# Patient Record
Sex: FEMALE | Race: WHITE | HISPANIC OR LATINO | Employment: FULL TIME | URBAN - METROPOLITAN AREA
[De-identification: names, ages, dates, MRNs, and addresses within clinical notes are randomized per-mention and may not be internally consistent; named-entity substitution may affect disease eponyms.]

---

## 2017-03-05 ENCOUNTER — PATIENT MESSAGE (OUTPATIENT)
Dept: ALLERGY | Facility: CLINIC | Age: 42
End: 2017-03-05

## 2017-03-08 RX ORDER — ALBUTEROL SULFATE 90 UG/1
2 AEROSOL, METERED RESPIRATORY (INHALATION) EVERY 6 HOURS PRN
Qty: 18 G | Refills: 2 | Status: SHIPPED | OUTPATIENT
Start: 2017-03-08 | End: 2017-07-06 | Stop reason: ALTCHOICE

## 2017-03-08 RX ORDER — ALBUTEROL SULFATE 90 UG/1
2 AEROSOL, METERED RESPIRATORY (INHALATION) EVERY 4 HOURS PRN
Qty: 1 INHALER | Refills: 3 | Status: SHIPPED | OUTPATIENT
Start: 2017-03-08 | End: 2017-05-22 | Stop reason: SDUPTHER

## 2017-04-05 ENCOUNTER — PATIENT MESSAGE (OUTPATIENT)
Dept: INTERNAL MEDICINE | Facility: CLINIC | Age: 42
End: 2017-04-05

## 2017-04-06 NOTE — TELEPHONE ENCOUNTER
If these symptoms persist please have patient come in for an appointment so we can determine what is going on and what is the best treatment.

## 2017-04-21 ENCOUNTER — HOSPITAL ENCOUNTER (OUTPATIENT)
Dept: RADIOLOGY | Facility: OTHER | Age: 42
Discharge: HOME | End: 2017-04-21
Attending: OBSTETRICS & GYNECOLOGY
Payer: COMMERCIAL

## 2017-04-21 DIAGNOSIS — Z12.31 VISIT FOR SCREENING MAMMOGRAM: ICD-10-CM

## 2017-04-21 PROCEDURE — 77067 SCR MAMMO BI INCL CAD: CPT | Mod: 26,,, | Performed by: RADIOLOGY

## 2017-04-21 PROCEDURE — 77063 BREAST TOMOSYNTHESIS BI: CPT | Mod: 26,,, | Performed by: RADIOLOGY

## 2017-04-21 PROCEDURE — 77067 SCR MAMMO BI INCL CAD: CPT | Mod: TC

## 2017-05-21 ENCOUNTER — PATIENT MESSAGE (OUTPATIENT)
Dept: ALLERGY | Facility: CLINIC | Age: 42
End: 2017-05-21

## 2017-05-23 RX ORDER — ALBUTEROL SULFATE 90 UG/1
2 AEROSOL, METERED RESPIRATORY (INHALATION) EVERY 4 HOURS PRN
Qty: 1 INHALER | Refills: 3 | Status: SHIPPED | OUTPATIENT
Start: 2017-05-23 | End: 2018-05-07 | Stop reason: SDUPTHER

## 2017-07-05 ENCOUNTER — TELEPHONE (OUTPATIENT)
Dept: INTERNAL MEDICINE | Facility: CLINIC | Age: 42
End: 2017-07-05

## 2017-07-05 NOTE — TELEPHONE ENCOUNTER
----- Message from Yaenlis Ortiz sent at 7/5/2017  1:13 PM CDT -----  Contact: pt  _  1st Request  _  2nd Request  _  3rd Request        Who: pt    Why: pt suspects uti. Wants to know if she can be prescribed something or does she have to come in?    What Number to Call Back:[phone]    When to Expect a call back: (With in 24 hours)

## 2017-07-06 ENCOUNTER — OFFICE VISIT (OUTPATIENT)
Dept: INTERNAL MEDICINE | Facility: CLINIC | Age: 42
End: 2017-07-06
Payer: COMMERCIAL

## 2017-07-06 VITALS
DIASTOLIC BLOOD PRESSURE: 80 MMHG | SYSTOLIC BLOOD PRESSURE: 110 MMHG | HEART RATE: 68 BPM | BODY MASS INDEX: 36.51 KG/M2 | HEIGHT: 64 IN | OXYGEN SATURATION: 97 % | WEIGHT: 213.88 LBS

## 2017-07-06 DIAGNOSIS — N39.0 URINARY TRACT INFECTION WITH HEMATURIA, SITE UNSPECIFIED: Primary | ICD-10-CM

## 2017-07-06 DIAGNOSIS — R31.9 URINARY TRACT INFECTION WITH HEMATURIA, SITE UNSPECIFIED: Primary | ICD-10-CM

## 2017-07-06 LAB
BACTERIA #/AREA URNS HPF: ABNORMAL /HPF
BILIRUB SERPL-MCNC: NEGATIVE MG/DL
BILIRUB UR QL STRIP: NEGATIVE
BLOOD URINE, POC: NORMAL
CLARITY UR: ABNORMAL
COLOR UR: YELLOW
COLOR, POC UA: YELLOW
GLUCOSE UR QL STRIP: NEGATIVE
GLUCOSE UR QL STRIP: NORMAL
HGB UR QL STRIP: ABNORMAL
KETONES UR QL STRIP: NEGATIVE
KETONES UR QL STRIP: NEGATIVE
LEUKOCYTE ESTERASE UR QL STRIP: NEGATIVE
LEUKOCYTE ESTERASE URINE, POC: NORMAL
MICROSCOPIC COMMENT: ABNORMAL
NITRITE UR QL STRIP: POSITIVE
NITRITE, POC UA: POSITIVE
PH UR STRIP: 6 [PH] (ref 5–8)
PH, POC UA: 5
PROT UR QL STRIP: ABNORMAL
PROTEIN, POC: NORMAL
RBC #/AREA URNS HPF: 15 /HPF (ref 0–4)
SP GR UR STRIP: >=1.03 (ref 1–1.03)
SPECIFIC GRAVITY, POC UA: 1.02
SQUAMOUS #/AREA URNS HPF: 1 /HPF
URN SPEC COLLECT METH UR: ABNORMAL
UROBILINOGEN UR STRIP-ACNC: NEGATIVE EU/DL
UROBILINOGEN, POC UA: NORMAL
WBC #/AREA URNS HPF: 5 /HPF (ref 0–5)
WBC CLUMPS URNS QL MICRO: ABNORMAL

## 2017-07-06 PROCEDURE — 99213 OFFICE O/P EST LOW 20 MIN: CPT | Mod: 25,S$GLB,, | Performed by: NURSE PRACTITIONER

## 2017-07-06 PROCEDURE — 81001 URINALYSIS AUTO W/SCOPE: CPT | Mod: S$GLB,,, | Performed by: NURSE PRACTITIONER

## 2017-07-06 PROCEDURE — 81000 URINALYSIS NONAUTO W/SCOPE: CPT

## 2017-07-06 PROCEDURE — 99999 PR PBB SHADOW E&M-EST. PATIENT-LVL III: CPT | Mod: PBBFAC,,, | Performed by: NURSE PRACTITIONER

## 2017-07-06 RX ORDER — SULFAMETHOXAZOLE AND TRIMETHOPRIM 800; 160 MG/1; MG/1
1 TABLET ORAL 2 TIMES DAILY
Qty: 10 TABLET | Refills: 0 | Status: SHIPPED | OUTPATIENT
Start: 2017-07-06 | End: 2018-05-15

## 2017-07-06 NOTE — PROGRESS NOTES
Subjective:       Patient ID: Tete Liu is a 41 y.o. female.    Chief Complaint: Urinary Tract Infection (painful urination, blood, small clots of tissue, on the external uncomfertable burning, took ibeprofen.  pinkish urine. it all started 7/5/17)    Tete Liu seen in urgent care for UTI symptoms    Reports dysuria, urinary frequency, and hematuria x 1 day. Denies fevers, chills, n/v, abdominal pain, or suprapubic pain. Of note, completed 7-day course of cipro for root canal two weeks ago. Recent travel to [place] on beach vacation. Denies recent instrumentation or hospitalization.       Urinary Tract Infection    This is a new problem. The current episode started yesterday. The problem has been unchanged. There has been no fever. Associated symptoms include frequency, hematuria and urgency. Pertinent negatives include no chills, discharge, flank pain, hesitancy, nausea, possible pregnancy, sweats, vomiting, constipation or rash. Associated symptoms comments:  had vasectomy, adamantly denied pregnancy. She has tried NSAIDs for the symptoms. The treatment provided no relief. There is no history of catheterization, diabetes mellitus, genitourinary reflux, hypertension, recurrent UTIs, STD or a urological procedure.     Review of Systems   Constitutional: Negative for chills.   HENT: Negative.    Eyes: Negative.    Respiratory: Negative.    Gastrointestinal: Negative for constipation, nausea and vomiting.   Endocrine: Negative.    Genitourinary: Positive for frequency, hematuria and urgency. Negative for flank pain and hesitancy.   Musculoskeletal: Negative.    Skin: Negative for rash.   Neurological: Negative.    Psychiatric/Behavioral: Negative for dysphoric mood.       Objective:      Physical Exam   Constitutional: She is oriented to person, place, and time. She appears well-developed and well-nourished.   HENT:   Head: Normocephalic and atraumatic.   Eyes: Conjunctivae and EOM are normal. Pupils are  equal, round, and reactive to light.   Neck: Normal range of motion. Neck supple.   Cardiovascular: Normal rate, regular rhythm, normal heart sounds and intact distal pulses.    Pulmonary/Chest: Effort normal.   Abdominal: Soft. Bowel sounds are normal. There is no tenderness.   Musculoskeletal: Normal range of motion.   Neurological: She is alert and oriented to person, place, and time.   Skin: Skin is warm and dry.   Psychiatric: Her behavior is normal.       Assessment:       1. Urinary tract infection with hematuria, site unspecified        Plan:       -POCT urinalysis + leukocytes, nitrites, and blood. Lab: UA and culture. Will email results per pt preference  -Bactrim DS BID x 5 days  -Symptomatic care  -f/u if failure to improve

## 2017-07-07 ENCOUNTER — PATIENT MESSAGE (OUTPATIENT)
Dept: INTERNAL MEDICINE | Facility: CLINIC | Age: 42
End: 2017-07-07

## 2017-07-09 ENCOUNTER — HOSPITAL ENCOUNTER (OUTPATIENT)
Facility: HOSPITAL | Age: 42
Discharge: HOME | End: 2017-07-10
Attending: EMERGENCY MEDICINE | Admitting: OTOLARYNGOLOGY
Payer: COMMERCIAL

## 2017-07-09 DIAGNOSIS — M79.5 FOREIGN BODY (FB) IN SOFT TISSUE: ICD-10-CM

## 2017-07-09 DIAGNOSIS — T17.208A FOREIGN BODY IN THROAT, INITIAL ENCOUNTER: Primary | ICD-10-CM

## 2017-07-09 PROCEDURE — 99285 EMERGENCY DEPT VISIT HI MDM: CPT

## 2017-07-09 PROCEDURE — 99284 EMERGENCY DEPT VISIT MOD MDM: CPT | Mod: ,,, | Performed by: EMERGENCY MEDICINE

## 2017-07-09 PROCEDURE — 96365 THER/PROPH/DIAG IV INF INIT: CPT

## 2017-07-10 ENCOUNTER — ANESTHESIA EVENT (OUTPATIENT)
Dept: SURGERY | Facility: HOSPITAL | Age: 42
End: 2017-07-10
Payer: COMMERCIAL

## 2017-07-10 ENCOUNTER — ANESTHESIA (OUTPATIENT)
Dept: SURGERY | Facility: HOSPITAL | Age: 42
End: 2017-07-10
Payer: COMMERCIAL

## 2017-07-10 ENCOUNTER — SURGERY (OUTPATIENT)
Age: 42
End: 2017-07-10

## 2017-07-10 VITALS
DIASTOLIC BLOOD PRESSURE: 59 MMHG | RESPIRATION RATE: 20 BRPM | WEIGHT: 215 LBS | TEMPERATURE: 97 F | OXYGEN SATURATION: 92 % | BODY MASS INDEX: 36.7 KG/M2 | SYSTOLIC BLOOD PRESSURE: 109 MMHG | HEART RATE: 65 BPM | HEIGHT: 64 IN

## 2017-07-10 PROBLEM — M79.5 FOREIGN BODY (FB) IN SOFT TISSUE: Status: ACTIVE | Noted: 2017-07-10

## 2017-07-10 PROBLEM — T17.208A FOREIGN BODY IN THROAT: Status: ACTIVE | Noted: 2017-07-10

## 2017-07-10 LAB
ALBUMIN SERPL BCP-MCNC: 3.9 G/DL
ALP SERPL-CCNC: 90 U/L
ALT SERPL W/O P-5'-P-CCNC: 19 U/L
ANION GAP SERPL CALC-SCNC: 10 MMOL/L
AST SERPL-CCNC: 20 U/L
B-HCG UR QL: NEGATIVE
BASOPHILS # BLD AUTO: 0.02 K/UL
BASOPHILS NFR BLD: 0.3 %
BILIRUB SERPL-MCNC: 0.3 MG/DL
BUN SERPL-MCNC: 11 MG/DL
CALCIUM SERPL-MCNC: 9.1 MG/DL
CHLORIDE SERPL-SCNC: 105 MMOL/L
CO2 SERPL-SCNC: 21 MMOL/L
CREAT SERPL-MCNC: 0.8 MG/DL
CTP QC/QA: YES
DIFFERENTIAL METHOD: ABNORMAL
EOSINOPHIL # BLD AUTO: 0.4 K/UL
EOSINOPHIL NFR BLD: 4.7 %
ERYTHROCYTE [DISTWIDTH] IN BLOOD BY AUTOMATED COUNT: 13.4 %
EST. GFR  (AFRICAN AMERICAN): >60 ML/MIN/1.73 M^2
EST. GFR  (NON AFRICAN AMERICAN): >60 ML/MIN/1.73 M^2
GLUCOSE SERPL-MCNC: 122 MG/DL
HCT VFR BLD AUTO: 45.2 %
HGB BLD-MCNC: 15 G/DL
LYMPHOCYTES # BLD AUTO: 1.2 K/UL
LYMPHOCYTES NFR BLD: 15.4 %
MCH RBC QN AUTO: 27.8 PG
MCHC RBC AUTO-ENTMCNC: 33.2 %
MCV RBC AUTO: 84 FL
MONOCYTES # BLD AUTO: 0.3 K/UL
MONOCYTES NFR BLD: 3.9 %
NEUTROPHILS # BLD AUTO: 5.9 K/UL
NEUTROPHILS NFR BLD: 75.6 %
PLATELET # BLD AUTO: 171 K/UL
PMV BLD AUTO: 12.6 FL
POTASSIUM SERPL-SCNC: 4.1 MMOL/L
PROT SERPL-MCNC: 7.5 G/DL
RBC # BLD AUTO: 5.39 M/UL
SODIUM SERPL-SCNC: 136 MMOL/L
WBC # BLD AUTO: 7.86 K/UL

## 2017-07-10 PROCEDURE — 25000003 PHARM REV CODE 250

## 2017-07-10 PROCEDURE — 63600175 PHARM REV CODE 636 W HCPCS: Performed by: NURSE ANESTHETIST, CERTIFIED REGISTERED

## 2017-07-10 PROCEDURE — D9220A PRA ANESTHESIA: Mod: CRNA,,, | Performed by: NURSE ANESTHETIST, CERTIFIED REGISTERED

## 2017-07-10 PROCEDURE — G0378 HOSPITAL OBSERVATION PER HR: HCPCS

## 2017-07-10 PROCEDURE — 71000039 HC RECOVERY, EACH ADD'L HOUR: Performed by: OTOLARYNGOLOGY

## 2017-07-10 PROCEDURE — 71000033 HC RECOVERY, INTIAL HOUR: Performed by: OTOLARYNGOLOGY

## 2017-07-10 PROCEDURE — 31526 DX LARYNGOSCOPY W/OPER SCOPE: CPT | Mod: ,,, | Performed by: OTOLARYNGOLOGY

## 2017-07-10 PROCEDURE — 25000003 PHARM REV CODE 250: Performed by: OTOLARYNGOLOGY

## 2017-07-10 PROCEDURE — 85025 COMPLETE CBC W/AUTO DIFF WBC: CPT

## 2017-07-10 PROCEDURE — 37000009 HC ANESTHESIA EA ADD 15 MINS: Performed by: OTOLARYNGOLOGY

## 2017-07-10 PROCEDURE — 94760 N-INVAS EAR/PLS OXIMETRY 1: CPT

## 2017-07-10 PROCEDURE — 37000008 HC ANESTHESIA 1ST 15 MINUTES: Performed by: OTOLARYNGOLOGY

## 2017-07-10 PROCEDURE — 27000221 HC OXYGEN, UP TO 24 HOURS

## 2017-07-10 PROCEDURE — 25000003 PHARM REV CODE 250: Performed by: NURSE ANESTHETIST, CERTIFIED REGISTERED

## 2017-07-10 PROCEDURE — 81025 URINE PREGNANCY TEST: CPT | Performed by: OTOLARYNGOLOGY

## 2017-07-10 PROCEDURE — D9220A PRA ANESTHESIA: Mod: ANES,,, | Performed by: ANESTHESIOLOGY

## 2017-07-10 PROCEDURE — 80053 COMPREHEN METABOLIC PANEL: CPT

## 2017-07-10 PROCEDURE — 36000708 HC OR TIME LEV III 1ST 15 MIN: Performed by: OTOLARYNGOLOGY

## 2017-07-10 PROCEDURE — 43200 ESOPHAGOSCOPY FLEXIBLE BRUSH: CPT | Mod: 51,,, | Performed by: OTOLARYNGOLOGY

## 2017-07-10 PROCEDURE — 36000709 HC OR TIME LEV III EA ADD 15 MIN: Performed by: OTOLARYNGOLOGY

## 2017-07-10 RX ORDER — ROCURONIUM BROMIDE 10 MG/ML
INJECTION, SOLUTION INTRAVENOUS
Status: DISCONTINUED | OUTPATIENT
Start: 2017-07-10 | End: 2017-07-10

## 2017-07-10 RX ORDER — DEXAMETHASONE SODIUM PHOSPHATE 4 MG/ML
INJECTION, SOLUTION INTRA-ARTICULAR; INTRALESIONAL; INTRAMUSCULAR; INTRAVENOUS; SOFT TISSUE
Status: DISCONTINUED | OUTPATIENT
Start: 2017-07-10 | End: 2017-07-10

## 2017-07-10 RX ORDER — FENTANYL CITRATE 50 UG/ML
INJECTION, SOLUTION INTRAMUSCULAR; INTRAVENOUS
Status: DISCONTINUED | OUTPATIENT
Start: 2017-07-10 | End: 2017-07-10

## 2017-07-10 RX ORDER — SODIUM CHLORIDE 0.9 % (FLUSH) 0.9 %
3 SYRINGE (ML) INJECTION
Status: DISCONTINUED | OUTPATIENT
Start: 2017-07-10 | End: 2017-07-10 | Stop reason: HOSPADM

## 2017-07-10 RX ORDER — MIDAZOLAM HYDROCHLORIDE 1 MG/ML
INJECTION, SOLUTION INTRAMUSCULAR; INTRAVENOUS
Status: DISCONTINUED | OUTPATIENT
Start: 2017-07-10 | End: 2017-07-10

## 2017-07-10 RX ORDER — OXYMETAZOLINE HCL 0.05 %
SPRAY, NON-AEROSOL (ML) NASAL
Status: DISCONTINUED | OUTPATIENT
Start: 2017-07-10 | End: 2017-07-10 | Stop reason: HOSPADM

## 2017-07-10 RX ORDER — PROPOFOL 10 MG/ML
VIAL (ML) INTRAVENOUS
Status: DISCONTINUED | OUTPATIENT
Start: 2017-07-10 | End: 2017-07-10

## 2017-07-10 RX ORDER — SUCCINYLCHOLINE CHLORIDE 20 MG/ML
INJECTION INTRAMUSCULAR; INTRAVENOUS
Status: DISCONTINUED | OUTPATIENT
Start: 2017-07-10 | End: 2017-07-10

## 2017-07-10 RX ORDER — DEXTROSE MONOHYDRATE, SODIUM CHLORIDE, AND POTASSIUM CHLORIDE 50; 1.49; 4.5 G/1000ML; G/1000ML; G/1000ML
INJECTION, SOLUTION INTRAVENOUS CONTINUOUS
Status: DISCONTINUED | OUTPATIENT
Start: 2017-07-10 | End: 2017-07-10 | Stop reason: HOSPADM

## 2017-07-10 RX ORDER — MORPHINE SULFATE 2 MG/ML
2 INJECTION, SOLUTION INTRAMUSCULAR; INTRAVENOUS EVERY 4 HOURS PRN
Status: DISCONTINUED | OUTPATIENT
Start: 2017-07-10 | End: 2017-07-10 | Stop reason: HOSPADM

## 2017-07-10 RX ORDER — TRIPROLIDINE/PSEUDOEPHEDRINE 2.5MG-60MG
600 TABLET ORAL EVERY 6 HOURS PRN
Qty: 354 ML | Refills: 0 | OUTPATIENT
Start: 2017-07-10 | End: 2017-07-10

## 2017-07-10 RX ORDER — HYDROCODONE BITARTRATE AND ACETAMINOPHEN 7.5; 325 MG/15ML; MG/15ML
15 SOLUTION ORAL EVERY 4 HOURS PRN
Status: DISCONTINUED | OUTPATIENT
Start: 2017-07-10 | End: 2017-07-10 | Stop reason: HOSPADM

## 2017-07-10 RX ORDER — HYDROCODONE BITARTRATE AND ACETAMINOPHEN 7.5; 325 MG/15ML; MG/15ML
15 SOLUTION ORAL EVERY 4 HOURS PRN
Qty: 336 ML | Refills: 0 | Status: SHIPPED | OUTPATIENT
Start: 2017-07-10 | End: 2017-07-10

## 2017-07-10 RX ORDER — LIDOCAINE HCL/PF 100 MG/5ML
SYRINGE (ML) INTRAVENOUS
Status: DISCONTINUED | OUTPATIENT
Start: 2017-07-10 | End: 2017-07-10

## 2017-07-10 RX ORDER — TRIPROLIDINE/PSEUDOEPHEDRINE 2.5MG-60MG
600 TABLET ORAL EVERY 6 HOURS PRN
Status: DISCONTINUED | OUTPATIENT
Start: 2017-07-10 | End: 2017-07-10 | Stop reason: HOSPADM

## 2017-07-10 RX ORDER — SODIUM CHLORIDE 9 MG/ML
INJECTION, SOLUTION INTRAVENOUS CONTINUOUS PRN
Status: DISCONTINUED | OUTPATIENT
Start: 2017-07-10 | End: 2017-07-10

## 2017-07-10 RX ORDER — ONDANSETRON 2 MG/ML
INJECTION INTRAMUSCULAR; INTRAVENOUS
Status: DISCONTINUED | OUTPATIENT
Start: 2017-07-10 | End: 2017-07-10

## 2017-07-10 RX ORDER — ALBUTEROL SULFATE 90 UG/1
2 AEROSOL, METERED RESPIRATORY (INHALATION) EVERY 4 HOURS PRN
Status: DISCONTINUED | OUTPATIENT
Start: 2017-07-10 | End: 2017-07-10 | Stop reason: HOSPADM

## 2017-07-10 RX ORDER — TRIPROLIDINE/PSEUDOEPHEDRINE 2.5MG-60MG
600 TABLET ORAL EVERY 6 HOURS PRN
Qty: 354 ML | Refills: 0 | Status: SHIPPED | OUTPATIENT
Start: 2017-07-10 | End: 2018-05-15

## 2017-07-10 RX ORDER — HYDROMORPHONE HYDROCHLORIDE 1 MG/ML
0.2 INJECTION, SOLUTION INTRAMUSCULAR; INTRAVENOUS; SUBCUTANEOUS EVERY 5 MIN PRN
Status: DISCONTINUED | OUTPATIENT
Start: 2017-07-10 | End: 2017-07-10

## 2017-07-10 RX ORDER — HYDROCODONE BITARTRATE AND ACETAMINOPHEN 7.5; 325 MG/15ML; MG/15ML
15 SOLUTION ORAL EVERY 4 HOURS PRN
Qty: 336 ML | Refills: 0 | Status: SHIPPED | OUTPATIENT
Start: 2017-07-10 | End: 2018-05-15

## 2017-07-10 RX ADMIN — ALUMINUM HYDROXIDE, MAGNESIUM HYDROXIDE, AND SIMETHICONE 50 ML: 200; 200; 20 SUSPENSION ORAL at 02:07

## 2017-07-10 RX ADMIN — DEXAMETHASONE SODIUM PHOSPHATE 4 MG: 4 INJECTION, SOLUTION INTRAMUSCULAR; INTRAVENOUS at 07:07

## 2017-07-10 RX ADMIN — LIDOCAINE HYDROCHLORIDE 75 MG: 20 INJECTION, SOLUTION INTRAVENOUS at 07:07

## 2017-07-10 RX ADMIN — ONDANSETRON 4 MG: 2 INJECTION INTRAMUSCULAR; INTRAVENOUS at 07:07

## 2017-07-10 RX ADMIN — SUCCINYLCHOLINE CHLORIDE 140 MG: 20 INJECTION, SOLUTION INTRAMUSCULAR; INTRAVENOUS at 07:07

## 2017-07-10 RX ADMIN — HYDROCODONE BITARTRATE AND ACETAMINOPHEN 15 ML: 7.5; 325 SOLUTION ORAL at 07:07

## 2017-07-10 RX ADMIN — OXYMETAZOLINE HYDROCHLORIDE 15 ML: 0.05 SPRAY NASAL at 07:07

## 2017-07-10 RX ADMIN — SODIUM CHLORIDE: 0.9 INJECTION, SOLUTION INTRAVENOUS at 06:07

## 2017-07-10 RX ADMIN — ROCURONIUM BROMIDE 5 MG: 10 INJECTION, SOLUTION INTRAVENOUS at 07:07

## 2017-07-10 RX ADMIN — DEXTROSE MONOHYDRATE, SODIUM CHLORIDE, AND POTASSIUM CHLORIDE: 50; 4.5; 1.49 INJECTION, SOLUTION INTRAVENOUS at 03:07

## 2017-07-10 RX ADMIN — FENTANYL CITRATE 50 MCG: 50 INJECTION, SOLUTION INTRAMUSCULAR; INTRAVENOUS at 07:07

## 2017-07-10 RX ADMIN — MIDAZOLAM HYDROCHLORIDE 2 MG: 1 INJECTION, SOLUTION INTRAMUSCULAR; INTRAVENOUS at 07:07

## 2017-07-10 RX ADMIN — PROPOFOL 200 MG: 10 INJECTION, EMULSION INTRAVENOUS at 07:07

## 2017-07-10 NOTE — ANESTHESIA PREPROCEDURE EVALUATION
07/10/2017  Tete Liu is a 41 y.o., female c PmHx asthma, allergic rhinitis, fe def anemia, with the sesnation of a fishbone stuck in her throat after eating last night now presents for evaluation for below procedure:    Pre-operative evaluation for Procedure(s) (LRB):  DLB, FB removal throat (N/A)    IV Access:  PIV 20g RF    Oxygen/Ventilatory Requirements:  RA    Infusions:   dextrose 5 % and 0.45 % NaCl with KCl 20 mEq 125 mL/hr at 07/10/17 0308         Last Airway:    None on file    Patient Active Problem List   Diagnosis    Unspecified asthma    Rhinitis, allergic    Uncomplicated asthma    Allergic conjunctivitis of both eyes    Iron deficiency anemia    FB in throat (fishbone)    Foreign body (FB) in soft tissue       Review of patient's allergies indicates:  No Known Allergies    No current facility-administered medications on file prior to encounter.      Current Outpatient Prescriptions on File Prior to Encounter   Medication Sig Dispense Refill    sulfamethoxazole-trimethoprim 800-160mg (BACTRIM DS) 800-160 mg Tab Take 1 tablet by mouth 2 (two) times daily. 10 tablet 0    albuterol 90 mcg/actuation inhaler Inhale 2 puffs into the lungs every 4 (four) hours as needed for Wheezing. 1 Inhaler 3       Past Surgical History:   Procedure Laterality Date     SECTION, LOW TRANSVERSE         Social History     Social History    Marital status:      Spouse name: N/A    Number of children: N/A    Years of education: N/A     Occupational History    [employer]     Social History Main Topics    Smoking status: Never Smoker    Smokeless tobacco: Never Used    Alcohol use No    Drug use: No    Sexual activity: Yes     Partners: Male     Other Topics Concern    Not on file     Social History Narrative    Lives w/ and kids         Vital Signs  Range (Last 24H):  Temp:  [36.6 °C (97.9 °F)-36.9 °C (98.5 °F)]   Pulse:  [62-75]   Resp:  [16-18]   BP: (131-147)/(68-85)   SpO2:  [97 %-98 %]       CBC:   Recent Labs      07/10/17   0318   WBC  7.86   RBC  5.39   HGB  15.0   HCT  45.2   PLT  171   MCV  84   MCH  27.8   MCHC  33.2       CMP:   Recent Labs      07/10/17   0318   NA  136   K  4.1   CL  105   CO2  21*   BUN  11   CREATININE  0.8   GLU  122*   CALCIUM  9.1   ALBUMIN  3.9   PROT  7.5   ALKPHOS  90   ALT  19   AST  20   BILITOT  0.3       INR  No results for input(s): INR, PROTIME, APTT in the last 72 hours.    Invalid input(s): PT        Diagnostic Studies:      EKG:  mal sinus rhythm  Minimal voltage criteria for LVH, may be normal variant  Normal ECG  No previous ECGs available  Confirmed by DINH ANDREWS MD ([id number]) on 9/27/2013 2:57:59 PM    Referred By:             Overread By: DINH ANDREWS MD    2D Echo:  None on file    Anesthesia Evaluation         Review of Systems      Physical Exam  General:  Well nourished, Obesity    Airway/Jaw/Neck:  Airway Findings: Mouth Opening: Normal Tongue: Normal  General Airway Assessment: Adult  Mallampati: I  Improves to I with phonation.  TM Distance: Normal, at least 6 cm        Eyes/Ears/Nose:  EYES/EARS/NOSE FINDINGS: Normal   Dental:  DENTAL FINDINGS: Normal   Chest/Lungs:  Chest/Lungs Findings: Clear to auscultation, Normal Respiratory Rate     Heart/Vascular:  Heart Findings: Rate: Normal  Rhythm: Regular Rhythm  Sounds: Normal     Abdomen:  Abdomen Findings: Normal    Musculoskeletal:  Musculoskeletal Findings: Normal   Skin:  Skin Findings: Normal    Mental Status:  Mental Status Findings:  Cooperative, Alert and Oriented         Anesthesia Plan  Type of Anesthesia, risks & benefits discussed:  Anesthesia Type:  general  Patient's Preference:   Intra-op Monitoring Plan: standard ASA monitors  Intra-op Monitoring Plan Comments:   Post Op Pain Control Plan: per primary service following discharge from  PACU and IV/PO Opioids PRN  Post Op Pain Control Plan Comments:   Induction:   IV  Beta Blocker:  Patient is not currently on a Beta-Blocker (No further documentation required).       Informed Consent: Patient representative understands risks and agrees with Anesthesia plan.  Questions answered. Anesthesia consent signed with patient representative.  ASA Score: 2     Day of Surgery Review of History & Physical:    H&P update referred to the surgeon.         Ready For Surgery From Anesthesia Perspective.

## 2017-07-10 NOTE — ED TRIAGE NOTES
Tete Liu, a 41 y.o. female presents to the ED      Chief Complaint   Patient presents with    Foreign Body In Throat     Pt reports having fishbone in throat. Tolerating PO, painful when swallowing. Denies SOB.     Review of patient's allergies indicates:  No Known Allergies  Past Medical History:   Diagnosis Date    Anemia     presumed due to heavy menses and possible fibroids    Asthma     adult onset        Adult Physical Assessment  LOC: Tete Liu, 41 y.o. female verified via two identifiers.  The patient is awake, alert, oriented and speaking appropriately at this time.  APPEARANCE: Patient resting comfortably and appears to be in no acute distress at this time. Patient is clean and well groomed, patient's clothing is properly fastened.  SKIN:The skin is warm and dry, color consistent with ethnicity, patient has normal skin turgor and moist mucus membranes, skin intact, no breakdown or brusing noted.  MUSCULOSKELETAL: Patient moving all extremities well, no obvious swelling or deformities noted.  RESPIRATORY: Airway is open and patent, respirations are spontaneous, patient has a normal effort and rate, no accessory muscle use noted.  CARDIAC: Patient has a normal rate and rhythm, no periphreal edema noted in any extremity, capillary refill < 3 seconds in all extremities  ABDOMEN: Soft and non tender to palpation, no abdominal distention noted. Bowel sounds present in all four quadrants.  NEUROLOGIC: Eyes open spontaneously, behavior appropriate to situation, follows commands, facial expression symmetrical, bilateral hand grasp equal and even, purposeful motor response noted, normal sensation in all extremities when touched with a finger.

## 2017-07-10 NOTE — BRIEF OP NOTE
[facility]  Brief Operative Note     SUMMARY     Surgery Date: 7/10/2017     Surgeon(s) and Role:     * Randy Pierson MD - Resident - Assisting     * Marc Peterson MD - Primary        Pre-op Diagnosis:  Foreign body (FB) in soft tissue [M79.5]    Post-op Diagnosis:  Post-Op Diagnosis Codes:     * Foreign body (FB) in soft tissue [M79.5]    Procedure(s) (LRB):  DLB, FB removal throat (N/A)    Anesthesia: General    Description of the findings of the procedure: see op note    Findings/Key Components: no identified foreign body    Estimated Blood Loss: * No values recorded between 7/10/2017  7:11 AM and 7/10/2017  7:29 AM *         Specimens:   Specimen (12h ago through future)    None          Discharge Note    SUMMARY     Admit Date: 7/9/2017    Discharge Date and Time:  07/10/2017 7:29 AM    Hospital Course (synopsis of major diagnoses, care, treatment, and services provided during the course of the hospital stay): Following completion of an urgently scheduled procedure, the patient was transferred to the APCU then her floor room for postoperative monitoring.   her hospital course was uneventful and noted for adequate pain control and PO intake following surgery.  She is discharged home in good condition and will follow-up with Dr. Peterson as scheduled.          Final Diagnosis: Post-Op Diagnosis Codes:     * Foreign body (FB) in soft tissue [M79.5]    Disposition: Home or Self Care    Follow Up/Patient Instructions:      Medications:  Reconciled Home Medications:   Current Discharge Medication List      START taking these medications    Details   (Magic mouthwash) 1:1:1 Benadryl 12.5mg/5ml liq, aluminum & magnesium hydroxide-simehticone (Maalox), lidocaine viscous 2% Swish and spit 15 mLs every 4 (four) hours as needed (pain). Okay to swallow  Qty: 360 mL, Refills: 0      hydrocodone-acetaminophen (HYCET) solution 7.5-325 mg/15mL Take 15 mLs by mouth every 4 (four) hours as needed.  Qty:  336 mL, Refills: 0      ibuprofen (ADVIL,MOTRIN) 100 mg/5 mL suspension Take 30 mLs (600 mg total) by mouth every 6 (six) hours as needed.  Qty: 354 mL, Refills: 0         CONTINUE these medications which have NOT CHANGED    Details   sulfamethoxazole-trimethoprim 800-160mg (BACTRIM DS) 800-160 mg Tab Take 1 tablet by mouth 2 (two) times daily.  Qty: 10 tablet, Refills: 0      albuterol 90 mcg/actuation inhaler Inhale 2 puffs into the lungs every 4 (four) hours as needed for Wheezing.  Qty: 1 Inhaler, Refills: 3             Discharge Procedure Orders  Diet general   Order Comments: Soft diet; adv as tolerated     Activity as tolerated     Call MD for:  temperature >100.4     Call MD for:  persistent nausea and vomiting or diarrhea     Call MD for:  severe uncontrolled pain     Call MD for:  redness, tenderness, or signs of infection (pain, swelling, redness, odor or green/yellow discharge around incision site)     Call MD for:  difficulty breathing or increased cough     Call MD for:  severe persistent headache     Call MD for:  worsening rash     Call MD for:  persistent dizziness, light-headedness, or visual disturbances     Call MD for:  increased confusion or weakness     No dressing needed       Follow-up Information     Fozia Dawn NP.    Specialty:  Otolaryngology  Contact information:  [address]  [facility] [address] [phone]

## 2017-07-10 NOTE — CONSULTS
"Otolaryngology - Head and Neck Surgery  Consultation Report    Consultation From:    ED; Shashi Shankar MD    Chief Complaint:    FB throat    History of Present Illness: 41 y.o. year old female  c mild asthma and anemia who presents with complaint of "fish bone in throat". Pt states she was eating at a local restaurant this evening for a friend's birthday when she swallowed a fish bone which she now feels has lodged in her throat. She denies SOB, coughing, or n/v/f. She does endorse dysphagia. Ezra secretions but with mod-severe odynophagia. No issues with swallowing before. No ENT surgeries. FH and SH otherwise NC.    Review of Systems reviewed including    CONSTITUTIONAL: no fevers, chills, weight loss, night sweats  EYES: no diplopia, no blurry vision  ENT: as above   NEURO: no motor or sensory deficits  CV: no CP, no palpitations  PULM: no cough, no SOB, no wheezing   GI: no abd pain, no constipation/diarrhea  : no dysuria, no hematuria  MSK: no bone/joint pain  HEM: no bruising or bleeding   PSYCH - no depression, no anxiety    Past Medical History: Patient has a past medical history of Anemia and Asthma.    Past Surgical History: Patient has a past surgical history that includes  section, low transverse.    Social History: Patient reports that she has never smoked. She has never used smokeless tobacco. She reports that she does not drink alcohol or use drugs.    Family History: family history includes Cancer in her father and mother; Depression in her brother; Diabetes in her father; Early death in her brother; Hypertension in her mother; Mental illness in her brother; Stroke in her father.    Medications:   Current Facility-Administered Medications:     albuterol inhaler 2 puff, 2 puff, Inhalation, Q4H PRN, Randy Pierson MD    dextrose 5 % and 0.45 % NaCl with KCl 20 mEq infusion, , Intravenous, Continuous, Randy Pierson MD    GI cocktail (mylanta 30 mL, lidocaine 2 % viscous 10 mL, " dicyclomine 10 mL) 50 mL, , Oral, ED 1 Time, Randy Pierson MD    hydrocodone-apap 7.5-325 MG/15 ML oral solution 15 mL, 15 mL, Oral, Q4H PRN, Randy Pierson MD    ibuprofen 100 mg/5 mL suspension 600 mg, 600 mg, Oral, Q6H PRN, Randy Pierson MD    morphine injection 2 mg, 2 mg, Intravenous, Q4H PRN, Randy Pierson MD    Current Outpatient Prescriptions:     sulfamethoxazole-trimethoprim 800-160mg (BACTRIM DS) 800-160 mg Tab, Take 1 tablet by mouth 2 (two) times daily., Disp: 10 tablet, Rfl: 0    albuterol 90 mcg/actuation inhaler, Inhale 2 puffs into the lungs every 4 (four) hours as needed for Wheezing., Disp: 1 Inhaler, Rfl: 3   GI cocktail (mylanta 30 mL, lidocaine 2 % viscous 10 mL, dicyclomine 10 mL) 50 mL   Oral ED 1 Time        Allergies: Patient has No Known Allergies.    Physical Exam:  Temp:  [98.5 °F (36.9 °C)] 98.5 °F (36.9 °C)  Pulse:  [75] 75  Resp:  [18] 18  SpO2:  [98 %] 98 %  BP: (147)/(85) 147/85    Intake/Output  No intake or output data in the 24 hours ending 07/10/17 0204      General: NAD; Well appearing  Neuro: AAOx3. CN II-XII intact.  Respiratory: No labored breathing, no stridor  Voice:  raspy  Head/Face: Normal inspection    Eyes: EOMI;   Right Ear: Auricle WNL    Left Ear: Auricle WNL.  Nose: normal ext appearance and palpation;   OC: Lips and gingiva wnl.  Good dentition. FOM Soft.  Anterior Tongue nml size and mobility; Hard Palate wnl  OP: BOT WNL. Soft palate wnl with Midline uvula.   Posterior oropharynx patent, wnl  Neck/Lymphatic: No LAD.   Full ROM.    Flexible Fiberoptic Laryngoscopy   Verbal consent obtained. Anesthesia with 4% lidocaine instilled to bilateral nare.  Nasal Cavity- normal  Nasopharynx   Adenoid tissue - normal    eustachian tube orifices - normal   Oropharynx   Posterior pharyngeal wall - normal   Base of tongue - normal    Valleculae - normal  Hypopharynx   Pyriform sinuses - mild edema right pyriform   Post-cricoid  - able to visualize proximal aspect of  report FB (likely fishbone) protruding from esophageal inlet; on second look after additional anesthesia for attempted removal with Bryn forceps, I was unable to visualize the FB anymore  Supraglottis   Epiglottis - normal    AE folds- normal   Arytenoids - normal   Interarytenoid space - normal   False vocal cords - normal   Glottis   True vocal cords - normal mobility, small foci of hemorrhage on medial aspect of middle 1/3 left TVF  Subglottis: normal    LABORATORY  CBC  No results for input(s): WBC, HGB, HCT, MCV, PLT in the last 72 hours.  BMP  No results for input(s): GLU, NA, K, CL, CO2, BUN, CREATININE, CALCIUM, PHOS, MG, PREALBUMIN in the last 72 hours.  COAGS  No results for input(s): PROTIME, INR, PTT in the last 72 hours.    IMAGING  Neck plain film - FB in larynx     Assessment: 41 y.o. year old female  c FB in larynx as seen on CT and flexible laryngoscopy.   Able to visualize proximal aspect of report FB (likely fishbone) protruding from esophageal inlet. On second look after additional anesthesia for attempted removal with Bryn forceps, I was unable to visualize the FB anymore though pt reports continued globus and odynophagia and is anxious.      Plan:   Admit ENT for observation.  Added on for DLB this am (0700) - last meal 2100  NPO c MIVF  Analgesia prn  Consents obtained    Patient discussed with Dr. Peterson who agrees with the plan.

## 2017-07-10 NOTE — PLAN OF CARE
Problem: Patient Care Overview  Goal: Plan of Care Review  Outcome: Ongoing (interventions implemented as appropriate)  Pt AAO x 4. Drowsy, calm , and cooperative. VSS stable. IV removed. Discharged instructions explained to pt and pt verbalized understanding. Discharged via wheel chair transport with personal belongings and family at bedside.

## 2017-07-10 NOTE — NURSING TRANSFER
Nursing Transfer Note      7/10/2017     Transfer [id number]     Transfer via stretcher    Transfer with     Transported by PCT    Medicines sent: none    Chart send with patient: Yes    Notified: ,     Patient reassessed at: 7/10/17 at 0830    Report given to 11th floor RN. Pt resting quietly; arouses to voice; VSS. No distress noted. Pt tolerating sips of water; no nausea. Pt states pain is tolerable at this time.

## 2017-07-10 NOTE — H&P
"Otolaryngology - Head and Neck Surgery  Consultation Report     Consultation From:    ED; Shashi Shankar MD     Chief Complaint:    FB throat     History of Present Illness: 41 y.o. year old female  c mild asthma and anemia who presents with complaint of "fish bone in throat". Pt states she was eating at a local restaurant this evening for a friend's birthday when she swallowed a fish bone which she now feels has lodged in her throat. She denies SOB, coughing, or n/v/f. She does endorse dysphagia. Ezra secretions but with mod-severe odynophagia. No issues with swallowing before. No ENT surgeries. FH and SH otherwise NC.     Review of Systems reviewed including    CONSTITUTIONAL: no fevers, chills, weight loss, night sweats  EYES: no diplopia, no blurry vision  ENT: as above   NEURO: no motor or sensory deficits  CV: no CP, no palpitations  PULM: no cough, no SOB, no wheezing   GI: no abd pain, no constipation/diarrhea  : no dysuria, no hematuria  MSK: no bone/joint pain  HEM: no bruising or bleeding   PSYCH - no depression, no anxiety     Past Medical History: Patient has a past medical history of Anemia and Asthma.     Past Surgical History: Patient has a past surgical history that includes  section, low transverse.     Social History: Patient reports that she has never smoked. She has never used smokeless tobacco. She reports that she does not drink alcohol or use drugs.     Family History: family history includes Cancer in her father and mother; Depression in her brother; Diabetes in her father; Early death in her brother; Hypertension in her mother; Mental illness in her brother; Stroke in her father.     Medications:   Current Facility-Administered Medications:     albuterol inhaler 2 puff, 2 puff, Inhalation, Q4H PRN, Randy Pierson MD    dextrose 5 % and 0.45 % NaCl with KCl 20 mEq infusion, , Intravenous, Continuous, Randy Pierson MD    GI cocktail (mylanta 30 mL, lidocaine 2 % viscous 10 " mL, dicyclomine 10 mL) 50 mL, , Oral, ED 1 Time, Randy Pierson MD    hydrocodone-apap 7.5-325 MG/15 ML oral solution 15 mL, 15 mL, Oral, Q4H PRN, Randy Pierson MD    ibuprofen 100 mg/5 mL suspension 600 mg, 600 mg, Oral, Q6H PRN, Randy Pierson MD    morphine injection 2 mg, 2 mg, Intravenous, Q4H PRN, Randy Pierson MD     Current Outpatient Prescriptions:     sulfamethoxazole-trimethoprim 800-160mg (BACTRIM DS) 800-160 mg Tab, Take 1 tablet by mouth 2 (two) times daily., Disp: 10 tablet, Rfl: 0    albuterol 90 mcg/actuation inhaler, Inhale 2 puffs into the lungs every 4 (four) hours as needed for Wheezing., Disp: 1 Inhaler, Rfl: 3   GI cocktail (mylanta 30 mL, lidocaine 2 % viscous 10 mL, dicyclomine 10 mL) 50 mL   Oral ED 1 Time         Allergies: Patient has No Known Allergies.     Physical Exam:  Temp:  [98.5 °F (36.9 °C)] 98.5 °F (36.9 °C)  Pulse:  [75] 75  Resp:  [18] 18  SpO2:  [98 %] 98 %  BP: (147)/(85) 147/85     Intake/Output  No intake or output data in the 24 hours ending 07/10/17 0204        General: NAD; Well appearing  Neuro: AAOx3. CN II-XII intact.  Respiratory: No labored breathing, no stridor  Voice:            raspy  Head/Face: Normal inspection    Eyes: EOMI;   Right Ear: Auricle WNL    Left Ear: Auricle WNL.  Nose: normal ext appearance and palpation;   OC: Lips and gingiva wnl.  Good dentition. FOM Soft.  Anterior Tongue nml size and mobility; Hard Palate wnl  OP: BOT WNL. Soft palate wnl with Midline uvula.   Posterior oropharynx patent, wnl  Neck/Lymphatic: No LAD.   Full ROM.     Flexible Fiberoptic Laryngoscopy   Verbal consent obtained. Anesthesia with 4% lidocaine instilled to bilateral nare.  Nasal Cavity- normal  Nasopharynx                        Adenoid tissue - normal                         eustachian tube orifices - normal   Oropharynx                        Posterior pharyngeal wall - normal                        Base of tongue - normal                         Valleculae -  normal  Hypopharynx                        Pyriform sinuses - mild edema right pyriform                        Post-cricoid  - able to visualize proximal aspect of report FB (likely fishbone) protruding from esophageal inlet; on second look after additional anesthesia for attempted removal with Bryn forceps, I was unable to visualize the FB anymore  Supraglottis                        Epiglottis - normal                         AE folds- normal                        Arytenoids - normal                        Interarytenoid space - normal                        False vocal cords - normal   Glottis                        True vocal cords - normal mobility, small foci of hemorrhage on medial aspect of middle 1/3 left TVF  Subglottis: normal     LABORATORY  CBC  No results for input(s): WBC, HGB, HCT, MCV, PLT in the last 72 hours.  BMP  No results for input(s): GLU, NA, K, CL, CO2, BUN, CREATININE, CALCIUM, PHOS, MG, PREALBUMIN in the last 72 hours.  COAGS  No results for input(s): PROTIME, INR, PTT in the last 72 hours.     IMAGING  Neck plain film #1 - possible FB in larynx   Neck plain film #2 - possible FB in larynx     Assessment: 41 y.o. year old female  c FB in larynx as seen on CT and flexible laryngoscopy.   Able to visualize proximal aspect of report FB (likely fishbone) protruding from esophageal inlet. On second look after additional anesthesia for attempted removal with Bryn forceps, I was unable to visualize the FB anymore though pt reports continued globus and odynophagia and is anxious.        Plan:   Admit ENT for observation.  Added on for DLB this am (0700) - last meal 2100  NPO c MIVF  Analgesia prn  Consents obtained     Patient discussed with Dr. Peterson who agrees with the plan.

## 2017-07-10 NOTE — PLAN OF CARE
Patient discharged home with no needs.       07/10/17 1233   Final Note   Assessment Type Final Discharge Note   Discharge Disposition Home   Right Care Referral Info   Post Acute Recommendation No Care

## 2017-07-10 NOTE — ANESTHESIA POSTPROCEDURE EVALUATION
"Anesthesia Post Evaluation    Patient: Tete Liu    Procedure(s) Performed: Procedure(s) (LRB):  DLB, FB removal throat (N/A)    Final Anesthesia Type: general  Patient location during evaluation: PACU  Patient participation: Yes- Able to Participate  Level of consciousness: awake and alert and awake  Post-procedure vital signs: reviewed and stable  Pain management: adequate  Airway patency: patent  PONV status at discharge: No PONV  Anesthetic complications: no      Cardiovascular status: blood pressure returned to baseline  Respiratory status: unassisted and spontaneous ventilation  Hydration status: euvolemic  Follow-up not needed.        Visit Vitals  BP (!) 109/59 (BP Location: Right arm, Patient Position: Lying, BP Method: Automatic)   Pulse 65   Temp 36.3 °C (97.3 °F) (Oral)   Resp 20   Ht 5' 4" (1.626 m)   Wt 97.5 kg (215 lb)   LMP 04/28/2017 (Approximate)   SpO2 (!) 92%   Breastfeeding? No   BMI 36.90 kg/m²       Pain/Cleopatra Score: Pain Assessment Performed: Yes (7/10/2017  8:30 AM)  Presence of Pain: non-verbal indicators absent (states pain is tolerable) (7/10/2017  8:30 AM)  Pain Rating Prior to Med Admin: 6 (7/10/2017  7:53 AM)  Pain Rating Post Med Admin: 5 (7/10/2017  8:30 AM)  Cleopatra Score: 9 (7/10/2017  8:30 AM)      "

## 2017-07-10 NOTE — ED PROVIDER NOTES
"Encounter Date: 2017    SCRIBE #1 NOTE: I, Ericka Castaneda, am scribing for, and in the presence of,  Dr. Shankar. I have scribed the following portions of the note - the Resident attestation.       History     Chief Complaint   Patient presents with    Foreign Body In Throat     Pt reports having fishbone in throat. Tolerating PO, painful when swallowing. Denies SOB.     Ms. Liu is a 40 yo F with PMHx of mild asthma and anemia who presents with complaint of "fish bone in throat". Pt states she was eating at a local restaurant this evening for a friend's birthday when she swallowed a fish bone which she now feels has lodged in her throat. She denies SOB, coughing, or n/v/f. She does endorse dysphagia.           Review of patient's allergies indicates:  No Known Allergies  Past Medical History:   Diagnosis Date    Anemia     presumed due to heavy menses and possible fibroids    Asthma     adult onset     Past Surgical History:   Procedure Laterality Date     SECTION, LOW TRANSVERSE       Family History   Problem Relation Age of Onset    Cancer Mother     Hypertension Mother     Diabetes Father     Stroke Father     Cancer Father     Depression Brother     Early death Brother     Mental illness Brother      Social History   Substance Use Topics    Smoking status: Never Smoker    Smokeless tobacco: Never Used    Alcohol use No     Review of Systems   Constitutional: Negative for activity change, appetite change, chills, fatigue and fever.   HENT: Positive for sore throat and trouble swallowing. Negative for congestion, postnasal drip and rhinorrhea.    Eyes: Negative.    Respiratory: Negative.  Negative for cough and shortness of breath.    Cardiovascular: Negative.  Negative for chest pain.   Gastrointestinal: Negative.    Endocrine: Negative.    Genitourinary: Negative.    Musculoskeletal: Positive for neck pain.   Allergic/Immunologic: Negative.        Physical Exam     Initial " "Vitals [07/09/17 2149]   BP Pulse Resp Temp SpO2   (!) 147/85 75 18 98.5 °F (36.9 °C) 98 %      MAP       105.67         Physical Exam    Constitutional: She appears well-developed and well-nourished. She is not diaphoretic. No distress.   HENT:   Head: Normocephalic and atraumatic.   Eyes: EOM are normal. Pupils are equal, round, and reactive to light.   Neck: Normal range of motion. Neck supple.   Cardiovascular: Normal rate, regular rhythm and normal heart sounds. Exam reveals no gallop and no friction rub.    No murmur heard.  Pulmonary/Chest: Breath sounds normal. No respiratory distress. She has no wheezes. She has no rhonchi. She has no rales.   Abdominal: Soft. Bowel sounds are normal.   Neurological: She is alert and oriented to person, place, and time. She has normal strength. No sensory deficit.   Psychiatric: She has a normal mood and affect. Thought content normal.         ED Course   Procedures  Labs Reviewed   CBC W/ AUTO DIFFERENTIAL   COMPREHENSIVE METABOLIC PANEL             Medical Decision Making:   Initial Assessment:   Ms. Liu is a 40 yo F with PMHx of mild asthma and anemia who presents with complaint of "fish bone in throat". Pt states she was eating at a local restaurant this evening for a friend's birthday when she swallowed a fish bone which she now feels has lodged in her throat. She denies SOB, coughing, or n/v/f. She does endorse dysphagia.   Differential Diagnosis:   1. Foreign body  2. Soft tissue trauma/abrasion    Clinical Tests:   Radiological Study: Ordered  ED Management:  Xray Soft Tissue Neck and CXR ordered.  Xray neck shows concern for FB supraglottic larynx.  ENT consulted and unable to remove at bedside, admitted to their service for another attempt.            Scribe Attestation:   Scribe #1: I performed the above scribed service and the documentation accurately describes the services I performed. I attest to the accuracy of the note.    Attending Attestation: "   Physician Attestation Statement for Resident:  As the supervising MD   Physician Attestation Statement: I have personally seen and examined this patient.   I agree with the above history. -:   As the supervising MD I agree with the above PE.    As the supervising MD I agree with the above treatment, course, plan, and disposition.   -:     Patient status post eating fish with suspected bone and foreign body sensation in throat. X-ray concerning for likely bone foreign body. ENT consulted for removal and unable at bedside, will admit          Physician Attestation for Scribe:  Physician Attestation Statement for Scribe #1: I, Dr. Shankar, reviewed documentation, as scribed by Ericka Castaneda in my presence, and it is both accurate and complete.                 ED Course     Clinical Impression:   The encounter diagnosis was Foreign body (FB) in soft tissue.                           Shashi Shankar MD  07/10/17 0335

## 2017-07-10 NOTE — PLAN OF CARE
Patient discharged home with no needs prior to assessment, information obtained from medical record.  Patient is a 41 year old female admitted from home through the ER with a fish bone stuck in her throat.  Patient went to OR with ENT this morning to remove foreign body.  Patient discharged home with no needs.    PCP  Junior Brownlee MD  [address] / [facility] [address]  [phone]      [id number] - [place] - [address]  [address]  [place] [address]  Phone: [phone] Fax: [phone]      Extended Emergency Contact Information  Primary Emergency Contact: Kevin Plunkett  Address: [address] [place] [address] of [facility]  Home Phone: [phone]  Mobile Phone: [phone]  Relation: Spouse       07/10/17 1230   Discharge Assessment   Assessment Type Discharge Planning Assessment   Assessment information obtained from? Medical Record   Prior to hospitilization cognitive status: Alert/Oriented   Prior to hospitalization functional status: Independent   Current cognitive status: Alert/Oriented   Current Functional Status: Independent   Arrived From home or self-care   Lives With spouse   Patient's perception of discharge disposition home or selfcare   Does the patient have transportation to healthcare appointments? Yes   Transportation Available family or friend will provide   Discharge Plan A Home with family

## 2017-07-16 NOTE — OP NOTE
DATE OF PROCEDURE:  07/10/2017    PREOPERATIVE DIAGNOSIS:  Pharyngeal foreign body.    POSTOPERATIVE DIAGNOSIS:  Pharyngeal foreign body.    PROCEDURES:  1.  Diagnostic direct laryngoscopy with the use of operating telescope.  2.  Rigid esophagoscopy to 29 cm.    SURGEON:  Marc Peterson M.D.    ASSISTANT:  Randy Pierson M.D. (RES)    ANESTHESIA:  General endotracheal.    ESTIMATED BLOOD LOSS:  Minimal.    SPECIMENS:  None.    INDICATIONS FOR PROCEDURE:  Ms. Plunkett is a 41-year-old woman who presented to   [facility] earlier this morning complaining foreign body sensation   in her throat.  She had been eating fish and was concerned that she swallowed a   fishbone.  A plain film of the neck was worrisome for a pharyngeal foreign body,   namely a fishbone.  She was seen by our service and was noted to have evidence   of a fishbone right in the esophageal introitus.  Attempts to retrieve this   endoscopically were unsuccessful.  There was also concern that she may have   passed this bone into her esophagus during the attempts to retrieve it.  In   light of these findings, it was recommended that we proceed to the operating   room for the aforementioned procedures.  In spite of the risks inherent to   surgery, she provided informed consent.    PROCEDURE IN DETAIL:  The patient was taken to the operating room, placed on the   operating table in supine position.  General anesthesia was induced by the   anesthesia team.  The patient was intubated by the Otolaryngology Service.    After exposing the larynx and examining the subglottis and trachea, which were   noted to be clear and free of foreign bodies.  She was then intubated and the   endotracheal tube was secured.  The remainder of her pharynx was examined   including the piriform sinus, postcricoid region as well as the oropharyngeal   structures, namely the tongue base and vallecula.  There was noted to be a small   abrasion on the posterior  pharyngeal wall; however, no gross evidence of   foreign body.    At this time, the Jesberg esophagoscope was brought into the field and inserted   into the esophagus to its full length of 29 cm.  There was no evidence of   foreign body or mucosal trauma within the esophagus.  Of note, during this   procedure, her upper teeth were protected with utilization of a mouthguard.  At   this time, the procedure was deemed concluded.  The mouthguard and the   esophagoscope was removed and discarded from the operative field and the patient   was handed back to Anesthesia, awakened, extubated and transported to Recovery   in satisfactory condition.  There were no intraoperative complications.      CPH/TAMI  dd: 07/16/2017 10:55:29 (CDT)  td: 07/16/2017 12:06:50 (CDT)  Doc ID   #[id number]  Job ID #[id number]    CC:

## 2017-07-25 NOTE — PROGRESS NOTES
Subjective:       Patient ID: Tete Liu is a 41 y.o. female.    Chief Complaint: Post-op Evaluation    HPI     Tete Liu presents for a follow up visit. She presented to [facility] on  complaining of a foreign body sensation in her throat.  She had been eating fish and was concerned that she swallowed a fishbone.  A plain film of the neck was worrisome for a pharyngeal foreign body,namely a fishbone.  She was seen by Dr. Pierson and was noted to have evidence of a fishbone right in the esophageal introitus.  Attempts to retrieve this endoscopically were unsuccessful.  There was also concern that she may have passed this bone into her esophagus during the attempts to retrieve it.  In light of these findings, it was recommended that she undergo DL under anesthesia. A small abrasion on the posterior pharyngeal wall was seen; however no foreign body was seen. She was then discharged home in stable condition.    She has been doing well since being discharged from the hospital. Her swallowing has returned to normal. She has no throat pain or odynophagia. There is no bleeding. She denies fever or chills. She has no complaints today.    Past Medical History:   Diagnosis Date    Anemia     presumed due to heavy menses and possible fibroids    Asthma     adult onset       Past Surgical History:   Procedure Laterality Date     SECTION, LOW TRANSVERSE           Current Outpatient Prescriptions:     (Magic mouthwash) 1:1:1 Benadryl 12.5mg/5ml liq, aluminum & magnesium hydroxide-simehticone (Maalox), lidocaine viscous 2%, Swish and spit 15 mLs every 4 (four) hours as needed (pain). Okay to swallow, Disp: 360 mL, Rfl: 0    albuterol 90 mcg/actuation inhaler, Inhale 2 puffs into the lungs every 4 (four) hours as needed for Wheezing., Disp: 1 Inhaler, Rfl: 3    hydrocodone-acetaminophen (HYCET) solution 7.5-325 mg/15mL, Take 15 mLs by mouth every 4 (four) hours as needed., Disp: 336 mL, Rfl: 0    ibuprofen  (ADVIL,MOTRIN) 100 mg/5 mL suspension, Take 30 mLs (600 mg total) by mouth every 6 (six) hours as needed., Disp: 354 mL, Rfl: 0    sulfamethoxazole-trimethoprim 800-160mg (BACTRIM DS) 800-160 mg Tab, Take 1 tablet by mouth 2 (two) times daily., Disp: 10 tablet, Rfl: 0    Review of patient's allergies indicates:  No Known Allergies    Social History     Social History    Marital status:      Spouse name: N/A    Number of children: N/A    Years of education: N/A     Occupational History    [employer]     Social History Main Topics    Smoking status: Never Smoker    Smokeless tobacco: Never Used    Alcohol use No    Drug use: No    Sexual activity: Yes     Partners: Male     Other Topics Concern    Not on file     Social History Narrative    Lives w/ and kids       Family History   Problem Relation Age of Onset    Cancer Mother     Hypertension Mother     Diabetes Father     Stroke Father     Cancer Father     Depression Brother     Early death Brother     Mental illness Brother          Review of Systems   Constitutional: Negative for appetite change, chills, diaphoresis, fatigue, fever and unexpected weight change.   HENT: Negative for congestion, dental problem, drooling, ear discharge, ear pain, facial swelling, hearing loss, mouth sores, nosebleeds, postnasal drip, rhinorrhea, sinus pressure, sneezing, sore throat, tinnitus, trouble swallowing and voice change.    Eyes: Negative for pain, discharge, redness and itching.   Respiratory: Negative for cough and shortness of breath.    Cardiovascular: Negative for chest pain.   Gastrointestinal: Negative for abdominal distention, abdominal pain, diarrhea, nausea and vomiting.   Endocrine: Negative for cold intolerance and heat intolerance.   Genitourinary: Negative for difficulty urinating.   Musculoskeletal: Negative for neck pain and neck stiffness.   Skin: Negative for rash.   Neurological: Negative for  dizziness, weakness and headaches.   Hematological: Negative for adenopathy.       Objective:      Physical Exam   Constitutional: She is oriented to person, place, and time. She appears well-developed and well-nourished. No distress.   HENT:   Head: Normocephalic and atraumatic.   Pulmonary/Chest: Effort normal. No respiratory distress.   Neurological: She is alert and oriented to person, place, and time.   Skin: She is not diaphoretic.   Vitals reviewed.      Visit devoted to counseling.     Assessment:       1. Foreign body in throat, initial encounter        Plan:       Tete Liu is 2 weeks s/p DL for a foreign body. She has no symptoms of a remaining foreign body. Questions answered. She may RTC prn.

## 2017-07-26 ENCOUNTER — OFFICE VISIT (OUTPATIENT)
Dept: OTOLARYNGOLOGY | Facility: CLINIC | Age: 42
End: 2017-07-26
Payer: COMMERCIAL

## 2017-07-26 VITALS
WEIGHT: 211.63 LBS | TEMPERATURE: 98 F | BODY MASS INDEX: 36.33 KG/M2 | DIASTOLIC BLOOD PRESSURE: 70 MMHG | HEART RATE: 79 BPM | SYSTOLIC BLOOD PRESSURE: 113 MMHG

## 2017-07-26 DIAGNOSIS — T17.208A FOREIGN BODY IN THROAT, INITIAL ENCOUNTER: Primary | ICD-10-CM

## 2017-07-26 PROCEDURE — 99212 OFFICE O/P EST SF 10 MIN: CPT | Mod: S$GLB,,, | Performed by: NURSE PRACTITIONER

## 2017-07-26 PROCEDURE — 99999 PR PBB SHADOW E&M-EST. PATIENT-LVL III: CPT | Mod: PBBFAC,,, | Performed by: NURSE PRACTITIONER

## 2017-10-14 ENCOUNTER — HOSPITAL ENCOUNTER (EMERGENCY)
Facility: HOSPITAL | Age: 42
Discharge: HOME | End: 2017-10-14
Attending: EMERGENCY MEDICINE
Payer: COMMERCIAL

## 2017-10-14 ENCOUNTER — NURSE TRIAGE (OUTPATIENT)
Dept: ADMINISTRATIVE | Facility: CLINIC | Age: 42
End: 2017-10-14

## 2017-10-14 VITALS
BODY MASS INDEX: 33.32 KG/M2 | HEART RATE: 70 BPM | OXYGEN SATURATION: 97 % | RESPIRATION RATE: 16 BRPM | TEMPERATURE: 98 F | DIASTOLIC BLOOD PRESSURE: 68 MMHG | HEIGHT: 65 IN | WEIGHT: 200 LBS | SYSTOLIC BLOOD PRESSURE: 138 MMHG

## 2017-10-14 DIAGNOSIS — R07.9 CHEST PAIN: ICD-10-CM

## 2017-10-14 DIAGNOSIS — R00.2 PALPITATIONS: Primary | ICD-10-CM

## 2017-10-14 DIAGNOSIS — J45.41 MODERATE PERSISTENT REACTIVE AIRWAY DISEASE WITH ACUTE EXACERBATION: ICD-10-CM

## 2017-10-14 LAB
ALBUMIN SERPL BCP-MCNC: 3.8 G/DL
ALP SERPL-CCNC: 106 U/L
ALT SERPL W/O P-5'-P-CCNC: 30 U/L
ANION GAP SERPL CALC-SCNC: 10 MMOL/L
AST SERPL-CCNC: 27 U/L
B-HCG UR QL: NEGATIVE
BASOPHILS # BLD AUTO: 0.05 K/UL
BASOPHILS NFR BLD: 0.6 %
BILIRUB SERPL-MCNC: 0.3 MG/DL
BNP SERPL-MCNC: 10 PG/ML
BUN SERPL-MCNC: 12 MG/DL
CALCIUM SERPL-MCNC: 9.1 MG/DL
CHLORIDE SERPL-SCNC: 105 MMOL/L
CO2 SERPL-SCNC: 23 MMOL/L
CREAT SERPL-MCNC: 0.7 MG/DL
CTP QC/QA: YES
DIFFERENTIAL METHOD: ABNORMAL
EOSINOPHIL # BLD AUTO: 0.9 K/UL
EOSINOPHIL NFR BLD: 10.6 %
ERYTHROCYTE [DISTWIDTH] IN BLOOD BY AUTOMATED COUNT: 13.4 %
EST. GFR  (AFRICAN AMERICAN): >60 ML/MIN/1.73 M^2
EST. GFR  (NON AFRICAN AMERICAN): >60 ML/MIN/1.73 M^2
GLUCOSE SERPL-MCNC: 98 MG/DL
HCT VFR BLD AUTO: 41.4 %
HGB BLD-MCNC: 13.2 G/DL
LYMPHOCYTES # BLD AUTO: 1.8 K/UL
LYMPHOCYTES NFR BLD: 22 %
MCH RBC QN AUTO: 26.7 PG
MCHC RBC AUTO-ENTMCNC: 31.9 G/DL
MCV RBC AUTO: 84 FL
MONOCYTES # BLD AUTO: 0.4 K/UL
MONOCYTES NFR BLD: 5.4 %
NEUTROPHILS # BLD AUTO: 5 K/UL
NEUTROPHILS NFR BLD: 61.4 %
PLATELET # BLD AUTO: 174 K/UL
PLATELET BLD QL SMEAR: ABNORMAL
PMV BLD AUTO: 13.1 FL
POTASSIUM SERPL-SCNC: 4.4 MMOL/L
PROT SERPL-MCNC: 7.6 G/DL
RBC # BLD AUTO: 4.94 M/UL
SODIUM SERPL-SCNC: 138 MMOL/L
TROPONIN I SERPL DL<=0.01 NG/ML-MCNC: <0.006 NG/ML
TSH SERPL DL<=0.005 MIU/L-ACNC: 0.68 UIU/ML
WBC # BLD AUTO: 8.17 K/UL

## 2017-10-14 PROCEDURE — 93010 ELECTROCARDIOGRAM REPORT: CPT | Mod: ,,, | Performed by: INTERNAL MEDICINE

## 2017-10-14 PROCEDURE — 93005 ELECTROCARDIOGRAM TRACING: CPT

## 2017-10-14 PROCEDURE — 99285 EMERGENCY DEPT VISIT HI MDM: CPT | Mod: ,,, | Performed by: EMERGENCY MEDICINE

## 2017-10-14 PROCEDURE — 84443 ASSAY THYROID STIM HORMONE: CPT

## 2017-10-14 PROCEDURE — 83880 ASSAY OF NATRIURETIC PEPTIDE: CPT

## 2017-10-14 PROCEDURE — 63600175 PHARM REV CODE 636 W HCPCS: Performed by: EMERGENCY MEDICINE

## 2017-10-14 PROCEDURE — 81025 URINE PREGNANCY TEST: CPT | Performed by: EMERGENCY MEDICINE

## 2017-10-14 PROCEDURE — 85025 COMPLETE CBC W/AUTO DIFF WBC: CPT

## 2017-10-14 PROCEDURE — 84484 ASSAY OF TROPONIN QUANT: CPT

## 2017-10-14 PROCEDURE — 80053 COMPREHEN METABOLIC PANEL: CPT

## 2017-10-14 PROCEDURE — 99284 EMERGENCY DEPT VISIT MOD MDM: CPT | Mod: 25

## 2017-10-14 RX ORDER — PREDNISONE 20 MG/1
40 TABLET ORAL
Status: COMPLETED | OUTPATIENT
Start: 2017-10-14 | End: 2017-10-14

## 2017-10-14 RX ORDER — PREDNISONE 20 MG/1
40 TABLET ORAL DAILY
Qty: 8 TABLET | Refills: 0 | Status: SHIPPED | OUTPATIENT
Start: 2017-10-14 | End: 2017-10-18

## 2017-10-14 RX ADMIN — PREDNISONE 40 MG: 20 TABLET ORAL at 10:10

## 2017-10-14 NOTE — TELEPHONE ENCOUNTER
Reason for Disposition   [1] MODERATE difficulty breathing (e.g., speaks in phrases, SOB even at rest, pulse 100-120) AND [2] NEW-onset or WORSE than normal    Protocols used: ST BREATHING DIFFICULTY-A-AH    Patient states her asthma pump isn't working well and she is having some chest tightness. Patient advised to go to ED for evaluation, she verbalized understanding.

## 2017-10-14 NOTE — PROVIDER PROGRESS NOTES - EMERGENCY DEPT.
Encounter Date: 10/14/2017    ED Physician Progress Notes         EKG - STEMI Decision  Initial Reading: No STEMI present.  Response: No Action Needed.

## 2017-10-15 NOTE — ED TRIAGE NOTES
"Pt presents to the ED c/o chest tightness and SOB that began 2-3 days ago. Hx asthma. Pt reports increased usage of her albuterol inhaler. Denies n/v. Pt also c/o palpitations that occurred yesterday. Pt states that today it feels more like a "quickening of the heart." Pt reports taking an oxycodone with some relief.  "

## 2017-10-15 NOTE — ED PROVIDER NOTES
Encounter Date: 10/14/2017    SCRIBE #1 NOTE: I, Jeanette Orozco, am scribing for, and in the presence of,  Dr. Perez. I have scribed the entire note.       History     Chief Complaint   Patient presents with    Chest Pain     like a tightness in my chest, using albuterol for asthma, then feels like heart beating fast and hard, denies cardiac hx     Time patient was seen by the provider: 8:38 PM    The patient is a 41 y.o. female with hx of: asthma and anemia that presents to the ED with a complaint of chest pain for the past few days requiring increased use of her albuterol inhaler for relief of sx. She also endorses chest tightness, SOB, palpitations yesterday lasting a few minutes, a URI within the past two weeks, feeling more tired than baseline, and some coughing. She notes slightly increased caffeine intake from baseline by drinking iced tea more frequently as well as some dust exposure today during some seasonal house cleaning. Pt also describes an episode of exercising when she experienced some RAY. She denies any hx of inhaled steroid usage, recent fevers, changes in sleep, changes in BMs, changes in menstrual cycles, hematemesis, hx of DVT, PE, DM, HLD, thyroid diseases, or MI.         The history is provided by the patient and medical records.     Review of patient's allergies indicates:  No Known Allergies  Past Medical History:   Diagnosis Date    Anemia     presumed due to heavy menses and possible fibroids    Asthma     adult onset     Past Surgical History:   Procedure Laterality Date     SECTION, LOW TRANSVERSE       Family History   Problem Relation Age of Onset    Cancer Mother     Hypertension Mother     Diabetes Father     Stroke Father     Cancer Father     Depression Brother     Early death Brother     Mental illness Brother      Social History   Substance Use Topics    Smoking status: Former Smoker     Types: Cigarettes     Quit date: 1988    Smokeless tobacco: Never  Used    Alcohol use Yes      Comment: rarely     Review of Systems   Constitutional: Positive for fatigue. Negative for fever.   HENT: Negative for nosebleeds and sore throat.    Eyes: Negative for visual disturbance.   Respiratory: Positive for cough, chest tightness and shortness of breath.    Cardiovascular: Positive for chest pain and palpitations.   Gastrointestinal: Negative for blood in stool, nausea and vomiting.   Genitourinary: Negative for dysuria.   Musculoskeletal: Negative for back pain.   Skin: Negative for rash.   Neurological: Negative for syncope and weakness.       Physical Exam     Initial Vitals [10/14/17 1753]   BP Pulse Resp Temp SpO2   (!) 146/75 70 18 98.9 °F (37.2 °C) 96 %      MAP       98.67         Physical Exam    Vitals reviewed.  Constitutional: No distress.   41 year old woman in no acute distress.    HENT:   Head: Normocephalic and atraumatic.   Mouth/Throat: No oral lesions.   Eyes: EOM are normal. Pupils are equal, round, and reactive to light.   Mildly pale conjunctiva noted bilaterally.    Neck: No tracheal deviation present.   Cardiovascular: Normal rate, regular rhythm and intact distal pulses.   Pulmonary/Chest: Breath sounds normal. No stridor. No respiratory distress.   Abdominal: Soft. She exhibits no distension. There is no tenderness.   Obese   Musculoskeletal: Normal range of motion. She exhibits no edema.   Neurological: She is alert and oriented to person, place, and time.   Psychiatric: Her behavior is normal. Thought content normal.         ED Course   Procedures  Labs Reviewed   CBC W/ AUTO DIFFERENTIAL - Abnormal; Notable for the following:        Result Value    MCH 26.7 (*)     MCHC 31.9 (*)     MPV 13.1 (*)     Eos # 0.9 (*)     Eosinophil% 10.6 (*)     All other components within normal limits   COMPREHENSIVE METABOLIC PANEL   TROPONIN I   B-TYPE NATRIURETIC PEPTIDE   TSH   POCT URINE PREGNANCY     EKG Readings: (Independently Interpreted)   NSR with normal  axis and normal ST segments at 67 bpm       X-Rays:   Independently Interpreted Readings:   Chest X-Ray: No acute findings.      Imaging Results          X-Ray Chest PA And Lateral (Final result)  Result time 10/14/17 21:12:13    Final result by Danica Morris MD (10/14/17 21:12:13)                 Impression:        No detrimental change or radiographic acute intrathoracic process seen.      Electronically signed by: DANICA MORRIS MD, MD  Date:     10/14/17  Time:    21:12              Narrative:    COMPARISON: Chest radiograph 7/10/17    FINDINGS: Two views of the chest. Large body habitus. EKG stickers overlie the chest. No detrimental change.   Pulmonary vasculature and hilar regions are within normal limits.  The bilateral lungs are well expanded and clear.  No pleural effusion or pneumothorax.  The heart and mediastinal contours are within normal limits for age.  Included osseous structures appear intact.                            \  Medical Decision Making:   History:   Old Medical Records: I decided to obtain old medical records.  Differential Diagnosis:   Symptomatic anemia, asthma exacerbation, unstable angina, palpitations, thyroid disorder  Independently Interpreted Test(s):   I have ordered and independently interpreted X-rays - see prior notes.  I have ordered and independently interpreted EKG Reading(s) - see prior notes  Clinical Tests:   Lab Tests: Ordered and Reviewed  Radiological Study: Ordered and Reviewed  Medical Tests: Ordered and Reviewed            Scribe Attestation:   Scribe #1: I performed the above scribed service and the documentation accurately describes the services I performed. I attest to the accuracy of the note.    Attending Attestation:             Attending ED Notes:   Emergency department evaluation today does not reveal evidence of significant hematologic or metabolic derangements, nor is there evidence of solid organ injury or cardiac ischemia.  Chest x-ray does not reveal  evidence of acute injury.  I believe that the presenting complaints are likely related to an exacerbation of her baseline reactive airway disease.  A 5 day course of prednisone 40 mg has been initiated in the emergency department, and a prescription has been provided for completion of the next 4 days.  I have instructed the patient to utilize her rescue inhaler with scheduled administration every 6 hours while awake for the next 3 days then as needed for shortness of breath.  I have also recommended that she follow-up closely with her managing physician for further consultation for potential initiation of inhaled steroid use.  All questions have been answered to the patient's satisfaction, and I have discussed with her indications to return to the emergency department such as worsening of current condition, fevers, chest pain, worsened shortness of breath, or other urgent concerns.          ED Course      Clinical Impression:   The primary encounter diagnosis was Palpitations. Diagnoses of Chest pain and Moderate persistent reactive airway disease with acute exacerbation were also pertinent to this visit.    Disposition:   Disposition: Discharged  Condition: Stable                        Sim Perez MD  10/17/17 1123

## 2018-03-05 ENCOUNTER — HOSPITAL ENCOUNTER (OUTPATIENT)
Dept: RADIOLOGY | Facility: OTHER | Age: 43
Discharge: HOME | End: 2018-03-05
Attending: OBSTETRICS & GYNECOLOGY
Payer: COMMERCIAL

## 2018-03-05 DIAGNOSIS — N64.52 DISCHARGE OF BREAST: ICD-10-CM

## 2018-03-05 PROCEDURE — 77062 BREAST TOMOSYNTHESIS BI: CPT | Mod: TC

## 2018-03-05 PROCEDURE — 76642 ULTRASOUND BREAST LIMITED: CPT | Mod: 26,RT,, | Performed by: RADIOLOGY

## 2018-03-05 PROCEDURE — 77066 DX MAMMO INCL CAD BI: CPT | Mod: 26,,, | Performed by: RADIOLOGY

## 2018-03-05 PROCEDURE — 77062 BREAST TOMOSYNTHESIS BI: CPT | Mod: 26,,, | Performed by: RADIOLOGY

## 2018-03-05 PROCEDURE — 76642 ULTRASOUND BREAST LIMITED: CPT | Mod: TC,RT

## 2018-05-07 ENCOUNTER — PATIENT MESSAGE (OUTPATIENT)
Dept: ALLERGY | Facility: CLINIC | Age: 43
End: 2018-05-07

## 2018-05-07 RX ORDER — ALBUTEROL SULFATE 90 UG/1
2 AEROSOL, METERED RESPIRATORY (INHALATION) EVERY 4 HOURS PRN
Qty: 1 INHALER | Refills: 0 | Status: SHIPPED | OUTPATIENT
Start: 2018-05-07 | End: 2018-07-27 | Stop reason: SDUPTHER

## 2018-05-15 ENCOUNTER — OFFICE VISIT (OUTPATIENT)
Dept: INTERNAL MEDICINE | Facility: CLINIC | Age: 43
End: 2018-05-15
Payer: COMMERCIAL

## 2018-05-15 VITALS
HEART RATE: 83 BPM | HEIGHT: 64 IN | WEIGHT: 221.31 LBS | SYSTOLIC BLOOD PRESSURE: 119 MMHG | DIASTOLIC BLOOD PRESSURE: 63 MMHG | OXYGEN SATURATION: 96 % | BODY MASS INDEX: 37.78 KG/M2

## 2018-05-15 DIAGNOSIS — N64.3 GALACTORRHEA: ICD-10-CM

## 2018-05-15 DIAGNOSIS — Z00.00 WELLNESS EXAMINATION: ICD-10-CM

## 2018-05-15 DIAGNOSIS — J45.909 UNCOMPLICATED ASTHMA, UNSPECIFIED ASTHMA SEVERITY, UNSPECIFIED WHETHER PERSISTENT: Primary | ICD-10-CM

## 2018-05-15 PROCEDURE — 99396 PREV VISIT EST AGE 40-64: CPT | Mod: S$GLB,,, | Performed by: INTERNAL MEDICINE

## 2018-05-15 PROCEDURE — 99999 PR PBB SHADOW E&M-EST. PATIENT-LVL IV: CPT | Mod: PBBFAC,,, | Performed by: INTERNAL MEDICINE

## 2018-05-15 RX ORDER — TRANEXAMIC ACID 650 MG/1
TABLET ORAL
Refills: 5 | COMMUNITY
Start: 2018-05-08 | End: 2018-09-10 | Stop reason: ALTCHOICE

## 2018-05-15 RX ORDER — AZITHROMYCIN 250 MG/1
TABLET, FILM COATED ORAL
Qty: 6 TABLET | Refills: 0 | Status: SHIPPED | OUTPATIENT
Start: 2018-05-15 | End: 2018-09-10 | Stop reason: ALTCHOICE

## 2018-05-17 ENCOUNTER — TELEPHONE (OUTPATIENT)
Dept: INTERNAL MEDICINE | Facility: CLINIC | Age: 43
End: 2018-05-17

## 2018-05-17 NOTE — TELEPHONE ENCOUNTER
Left message for patient to call back and get scheduled with Breast Surgery    Please Advise  Thank You

## 2018-05-19 ENCOUNTER — LAB VISIT (OUTPATIENT)
Dept: LAB | Facility: HOSPITAL | Age: 43
End: 2018-05-19
Attending: INTERNAL MEDICINE
Payer: COMMERCIAL

## 2018-05-19 DIAGNOSIS — Z00.00 WELLNESS EXAMINATION: ICD-10-CM

## 2018-05-19 DIAGNOSIS — N64.3 GALACTORRHEA: ICD-10-CM

## 2018-05-19 LAB
25(OH)D3+25(OH)D2 SERPL-MCNC: 18 NG/ML
ALBUMIN SERPL BCP-MCNC: 3.9 G/DL
ALP SERPL-CCNC: 96 U/L
ALT SERPL W/O P-5'-P-CCNC: 31 U/L
ANION GAP SERPL CALC-SCNC: 6 MMOL/L
AST SERPL-CCNC: 20 U/L
BASOPHILS # BLD AUTO: 0.03 K/UL
BASOPHILS NFR BLD: 0.5 %
BILIRUB SERPL-MCNC: 0.3 MG/DL
BUN SERPL-MCNC: 12 MG/DL
CALCIUM SERPL-MCNC: 9.3 MG/DL
CHLORIDE SERPL-SCNC: 108 MMOL/L
CHOLEST SERPL-MCNC: 193 MG/DL
CHOLEST/HDLC SERPL: 4.6 {RATIO}
CO2 SERPL-SCNC: 26 MMOL/L
CREAT SERPL-MCNC: 0.7 MG/DL
DIFFERENTIAL METHOD: ABNORMAL
EOSINOPHIL # BLD AUTO: 0.3 K/UL
EOSINOPHIL NFR BLD: 4.6 %
ERYTHROCYTE [DISTWIDTH] IN BLOOD BY AUTOMATED COUNT: 15 %
EST. GFR  (AFRICAN AMERICAN): >60 ML/MIN/1.73 M^2
EST. GFR  (NON AFRICAN AMERICAN): >60 ML/MIN/1.73 M^2
ESTIMATED AVG GLUCOSE: 134 MG/DL
FERRITIN SERPL-MCNC: 8 NG/ML
FSH SERPL-ACNC: 16.9 MIU/ML
GLUCOSE SERPL-MCNC: 114 MG/DL
HBA1C MFR BLD HPLC: 6.3 %
HCT VFR BLD AUTO: 42.7 %
HDLC SERPL-MCNC: 42 MG/DL
HDLC SERPL: 21.8 %
HGB BLD-MCNC: 13.5 G/DL
IRON SERPL-MCNC: 37 UG/DL
LDLC SERPL CALC-MCNC: 115.8 MG/DL
LH SERPL-ACNC: 5 MIU/ML
LYMPHOCYTES # BLD AUTO: 1.4 K/UL
LYMPHOCYTES NFR BLD: 21.8 %
MCH RBC QN AUTO: 25.3 PG
MCHC RBC AUTO-ENTMCNC: 31.6 G/DL
MCV RBC AUTO: 80 FL
MONOCYTES # BLD AUTO: 0.4 K/UL
MONOCYTES NFR BLD: 5.9 %
NEUTROPHILS # BLD AUTO: 4.2 K/UL
NEUTROPHILS NFR BLD: 67 %
NONHDLC SERPL-MCNC: 151 MG/DL
PLATELET # BLD AUTO: 156 K/UL
PMV BLD AUTO: 12.9 FL
POTASSIUM SERPL-SCNC: 4.6 MMOL/L
PROLACTIN SERPL IA-MCNC: 3.6 NG/ML
PROT SERPL-MCNC: 7 G/DL
RBC # BLD AUTO: 5.33 M/UL
SATURATED IRON: 9 %
SODIUM SERPL-SCNC: 140 MMOL/L
TOTAL IRON BINDING CAPACITY: 411 UG/DL
TRANSFERRIN SERPL-MCNC: 278 MG/DL
TRIGL SERPL-MCNC: 176 MG/DL
TSH SERPL DL<=0.005 MIU/L-ACNC: 0.67 UIU/ML
WBC # BLD AUTO: 6.29 K/UL

## 2018-05-19 PROCEDURE — 80061 LIPID PANEL: CPT

## 2018-05-19 PROCEDURE — 82728 ASSAY OF FERRITIN: CPT

## 2018-05-19 PROCEDURE — 83036 HEMOGLOBIN GLYCOSYLATED A1C: CPT

## 2018-05-19 PROCEDURE — 83002 ASSAY OF GONADOTROPIN (LH): CPT

## 2018-05-19 PROCEDURE — 85025 COMPLETE CBC W/AUTO DIFF WBC: CPT

## 2018-05-19 PROCEDURE — 80053 COMPREHEN METABOLIC PANEL: CPT

## 2018-05-19 PROCEDURE — 84443 ASSAY THYROID STIM HORMONE: CPT

## 2018-05-19 PROCEDURE — 36415 COLL VENOUS BLD VENIPUNCTURE: CPT

## 2018-05-19 PROCEDURE — 83540 ASSAY OF IRON: CPT

## 2018-05-19 PROCEDURE — 82306 VITAMIN D 25 HYDROXY: CPT

## 2018-05-19 PROCEDURE — 84146 ASSAY OF PROLACTIN: CPT

## 2018-05-19 PROCEDURE — 83001 ASSAY OF GONADOTROPIN (FSH): CPT

## 2018-05-23 NOTE — PROGRESS NOTES
Subjective:       Patient ID: Tete Liu is a 42 y.o. female.    Chief Complaint: Establish Care    HPI Pt is new to me - I see her mother who had a hx of breast cancer.  No CP or SOB.  Pt with sinus congestion, post nasal drip, sore throat and ear pressure. Minimal cough.  Hx of galactorrhea none recently.  Review of Systems   Respiratory: Negative for shortness of breath (PND or orthopnea).    Cardiovascular: Negative for chest pain (arm pain or jaw pain).   Gastrointestinal: Negative for abdominal pain, diarrhea, nausea and vomiting.   Genitourinary: Negative for dysuria.   Neurological: Negative for seizures, syncope and headaches.       Objective:      Physical Exam   Constitutional: She is oriented to person, place, and time. She appears well-developed and well-nourished. No distress.   HENT:   Head: Normocephalic.   Mouth/Throat: Oropharynx is clear and moist.   Neck: Neck supple. No JVD present. No thyromegaly present.   Cardiovascular: Normal rate, regular rhythm, normal heart sounds and intact distal pulses.  Exam reveals no gallop and no friction rub.    No murmur heard.  Pulmonary/Chest: Effort normal and breath sounds normal. She has no wheezes. She has no rales.   Abdominal: Soft. Bowel sounds are normal. She exhibits no distension and no mass. There is no tenderness. There is no rebound and no guarding.   Musculoskeletal: She exhibits no edema.   Lymphadenopathy:     She has no cervical adenopathy.   Neurological: She is alert and oriented to person, place, and time. She has normal reflexes.   Skin: Skin is warm and dry.   Psychiatric: She has a normal mood and affect. Her behavior is normal. Judgment and thought content normal.       Assessment:       1. Uncomplicated asthma, unspecified asthma severity, unspecified whether persistent    2. Wellness examination    3. Galactorrhea        Plan:   Uncomplicated asthma, unspecified asthma severity, unspecified whether persistent  Fair control  Wellness  examination  -     Ambulatory Referral to Breast Surgery  -     CBC auto differential; Future; Expected date: 05/15/2018  -     Comprehensive metabolic panel; Future; Expected date: 05/15/2018  -     Lipid panel; Future; Expected date: 05/15/2018  -     TSH; Future; Expected date: 05/15/2018  -     Hemoglobin A1c; Future; Expected date: 05/15/2018  -     Vitamin D; Future; Expected date: 05/15/2018  -     Iron and TIBC; Future; Expected date: 05/15/2018  -     Ferritin; Future; Expected date: 05/15/2018    Galactorrhea  -     Prolactin; Future; Expected date: 05/15/2018  -     Luteinizing hormone; Future; Expected date: 05/15/2018  -     Follicle stimulating hormone; Future; Expected date: 05/15/2018    Sinusitis  -     azithromycin (Z-ELO) 250 MG tablet; Two today then one daily thereafter  Dispense: 6 tablet; Refill: 0

## 2018-07-27 RX ORDER — ALBUTEROL SULFATE 90 UG/1
2 AEROSOL, METERED RESPIRATORY (INHALATION) EVERY 4 HOURS PRN
Qty: 1 INHALER | Refills: 0 | Status: SHIPPED | OUTPATIENT
Start: 2018-07-27 | End: 2019-03-19 | Stop reason: SDUPTHER

## 2018-09-10 ENCOUNTER — OFFICE VISIT (OUTPATIENT)
Dept: ALLERGY | Facility: CLINIC | Age: 43
End: 2018-09-10
Payer: COMMERCIAL

## 2018-09-10 VITALS
WEIGHT: 220 LBS | HEIGHT: 65 IN | HEART RATE: 68 BPM | SYSTOLIC BLOOD PRESSURE: 108 MMHG | BODY MASS INDEX: 36.65 KG/M2 | DIASTOLIC BLOOD PRESSURE: 72 MMHG | OXYGEN SATURATION: 97 %

## 2018-09-10 DIAGNOSIS — J45.21 ASTHMA IN ADULT, MILD INTERMITTENT, WITH ACUTE EXACERBATION: Primary | ICD-10-CM

## 2018-09-10 DIAGNOSIS — J30.9 ALLERGIC RHINITIS, UNSPECIFIED SEASONALITY, UNSPECIFIED TRIGGER: ICD-10-CM

## 2018-09-10 DIAGNOSIS — H10.13 ALLERGIC CONJUNCTIVITIS OF BOTH EYES: ICD-10-CM

## 2018-09-10 DIAGNOSIS — J45.20 ASTHMA IN ADULT, MILD INTERMITTENT, UNCOMPLICATED: ICD-10-CM

## 2018-09-10 PROCEDURE — 99214 OFFICE O/P EST MOD 30 MIN: CPT | Mod: S$GLB,,, | Performed by: ALLERGY & IMMUNOLOGY

## 2018-09-10 PROCEDURE — 3008F BODY MASS INDEX DOCD: CPT | Mod: CPTII,S$GLB,, | Performed by: ALLERGY & IMMUNOLOGY

## 2018-09-10 PROCEDURE — 99999 PR PBB SHADOW E&M-EST. PATIENT-LVL III: CPT | Mod: PBBFAC,,, | Performed by: ALLERGY & IMMUNOLOGY

## 2018-09-10 RX ORDER — ALBUTEROL SULFATE 90 UG/1
2 AEROSOL, METERED RESPIRATORY (INHALATION) EVERY 4 HOURS PRN
Qty: 1 INHALER | Refills: 3 | Status: SHIPPED | OUTPATIENT
Start: 2018-09-10 | End: 2019-01-08 | Stop reason: SDUPTHER

## 2018-09-10 RX ORDER — PREDNISONE 10 MG/1
TABLET ORAL
Qty: 21 TABLET | Refills: 0 | Status: SHIPPED | OUTPATIENT
Start: 2018-09-10 | End: 2019-03-19

## 2018-09-10 NOTE — PROGRESS NOTES
Tete Plunkett returns to clinic today for continued evaluation of allergic rhinitis and asthma.  She is here alone.  She was last seen August 2, 2016.    After last visit, she did well until recently.  In July she started having some increased chest tightness and mild wheezing.  That has increased over the last two months.  She is now needed her albuterol 4 or 5 times a day.  She knows when she needs to take it again.    She went to the beach for a week and it was better.    She does work at home and has been concerned about a roof leak in the bedroom.  When she does leave the house her symptoms do not improve however.    She and her  are concerned that her increased symptoms may be due to mold.    They do have a dog now in the house and have for several years.  The dog's bed is in their bedroom.  She does not have any symptoms around their dog.    She denies any recent infection or bronchitis.    She denies any indigestion or heartburn    Her rhinitis and conjunctivitis have been controlled.    She has a conference call for work this morning.    [facility] ALLERGY QUESTIONNAIRE SHORT 9/10/2018   Are you taking any new medications since your last visit? No   Constitution: Fatigue   Head or facial pain: No changes since my last visit with this provider   Eyes: No symptoms   Ears: Fullness, Pressure   Nose: No symptoms   Throat: No symptoms   Sinuses: No symptoms   Lungs: Cough, Wheezing, Asthma   Skin: No symptoms   Cardiovascular: Palpitations   Gastrointestinal: No symptoms   Genital/ urinary No symptoms   Musculoskeletal: Muscle pain, Joint pain   Neurologic: No symptoms   Endocrine: No symptoms   Hematologic: No symptoms      Physical Examination:  General: Well-developed, well-nourished, no acute distress.  Head: No facial tenderness.  Eyes: Conjunctivae: No conjunctival injection.  Ears: EAC's clear. TM's clear.   Nose: Nasal Mucosa: Pink. Septum: No apparent deviation. Turbinates: No significant edema.  Polyps/Mass: None visible.  Teeth/Gums: No bleeding noted.  Oropharynx: White exudate on tonsils.   Neck: Supple without thyromegaly. No cervical lymphadenopathy.   Respiratory/Chest: Effort: Good. Auscultation:  Mild end-expiratory wheeze on the left.  Skin: Good turgor. No urticaria or angioedema.  Neuro/Psych: Oriented x 3.     Inhalant skin tests 12/27/2001:  Positive cat, dust mites, trees, weeds, and grasses.    Spirometry 11/27/2009:  Normal.     Spirometry 11/27/2013: Normal.    Assessment:  1. Allergic rhinitis, controlled.  2. Allergic conjunctivitis, controlled.  3. Allergic asthma,  with acute exacerbation.  4. Probable strep pharyngitis, resolved.     Recommendations:  1.  Prednisone taper.  2.  Decrease albuterol when possible.  3.  Spirometry.  She will not be able to get today.  4.  Return to clinic in 1 to 2 weeks or sooner if symptoms are not controlled.  5.  Consider Breo.

## 2019-01-08 RX ORDER — ALBUTEROL SULFATE 90 UG/1
2 AEROSOL, METERED RESPIRATORY (INHALATION) EVERY 4 HOURS PRN
Qty: 1 INHALER | Refills: 3 | Status: SHIPPED | OUTPATIENT
Start: 2019-01-08 | End: 2019-05-28 | Stop reason: SDUPTHER

## 2019-03-19 ENCOUNTER — OFFICE VISIT (OUTPATIENT)
Dept: ALLERGY | Facility: CLINIC | Age: 44
End: 2019-03-19
Payer: COMMERCIAL

## 2019-03-19 ENCOUNTER — TELEPHONE (OUTPATIENT)
Dept: ALLERGY | Facility: CLINIC | Age: 44
End: 2019-03-19

## 2019-03-19 VITALS
SYSTOLIC BLOOD PRESSURE: 118 MMHG | BODY MASS INDEX: 38.5 KG/M2 | HEIGHT: 64 IN | DIASTOLIC BLOOD PRESSURE: 84 MMHG | WEIGHT: 225.5 LBS

## 2019-03-19 DIAGNOSIS — J30.9 ALLERGIC RHINITIS, UNSPECIFIED SEASONALITY, UNSPECIFIED TRIGGER: ICD-10-CM

## 2019-03-19 DIAGNOSIS — J06.9 VIRAL URI WITH COUGH: ICD-10-CM

## 2019-03-19 DIAGNOSIS — H10.13 ALLERGIC CONJUNCTIVITIS OF BOTH EYES: ICD-10-CM

## 2019-03-19 DIAGNOSIS — J45.21 MILD INTERMITTENT ASTHMA WITH ACUTE EXACERBATION: Primary | ICD-10-CM

## 2019-03-19 DIAGNOSIS — J40 BRONCHITIS: ICD-10-CM

## 2019-03-19 PROCEDURE — 99999 PR PBB SHADOW E&M-EST. PATIENT-LVL III: ICD-10-PCS | Mod: PBBFAC,,, | Performed by: ALLERGY & IMMUNOLOGY

## 2019-03-19 PROCEDURE — 3008F PR BODY MASS INDEX (BMI) DOCUMENTED: ICD-10-PCS | Mod: CPTII,S$GLB,, | Performed by: ALLERGY & IMMUNOLOGY

## 2019-03-19 PROCEDURE — 99214 PR OFFICE/OUTPT VISIT, EST, LEVL IV, 30-39 MIN: ICD-10-PCS | Mod: S$GLB,,, | Performed by: ALLERGY & IMMUNOLOGY

## 2019-03-19 PROCEDURE — 99214 OFFICE O/P EST MOD 30 MIN: CPT | Mod: S$GLB,,, | Performed by: ALLERGY & IMMUNOLOGY

## 2019-03-19 PROCEDURE — 3008F BODY MASS INDEX DOCD: CPT | Mod: CPTII,S$GLB,, | Performed by: ALLERGY & IMMUNOLOGY

## 2019-03-19 PROCEDURE — 99999 PR PBB SHADOW E&M-EST. PATIENT-LVL III: CPT | Mod: PBBFAC,,, | Performed by: ALLERGY & IMMUNOLOGY

## 2019-03-19 RX ORDER — BENZONATATE 100 MG/1
100 CAPSULE ORAL 3 TIMES DAILY PRN
Qty: 30 CAPSULE | Refills: 1 | Status: SHIPPED | OUTPATIENT
Start: 2019-03-19 | End: 2019-04-18

## 2019-03-19 RX ORDER — PROMETHAZINE HYDROCHLORIDE AND CODEINE PHOSPHATE 6.25; 1 MG/5ML; MG/5ML
5 SOLUTION ORAL EVERY 4 HOURS PRN
Qty: 118 ML | Refills: 0 | Status: SHIPPED | OUTPATIENT
Start: 2019-03-19 | End: 2019-03-29

## 2019-03-19 RX ORDER — AZITHROMYCIN 250 MG/1
250 TABLET, FILM COATED ORAL DAILY
Qty: 6 TABLET | Refills: 0 | Status: SHIPPED | OUTPATIENT
Start: 2019-03-19 | End: 2019-05-29 | Stop reason: ALTCHOICE

## 2019-03-19 RX ORDER — PROMETHAZINE HYDROCHLORIDE 6.25 MG/5ML
6.25 SYRUP ORAL EVERY 6 HOURS PRN
Qty: 118 ML | Refills: 0 | Status: SHIPPED | OUTPATIENT
Start: 2019-03-19 | End: 2019-03-19 | Stop reason: CLARIF

## 2019-03-19 RX ORDER — PREDNISONE 10 MG/1
TABLET ORAL
Qty: 21 TABLET | Refills: 0 | Status: SHIPPED | OUTPATIENT
Start: 2019-03-19 | End: 2019-05-29 | Stop reason: ALTCHOICE

## 2019-03-19 NOTE — PROGRESS NOTES
Tete Plunkett returns to clinic today for continued evaluation of allergic rhinitis and asthma.  She is here alone.  She was last seen September 10, 2018.    After her last visit, she did well with prednisone and her symptoms resolved.    After [place] she developed increased rhinitis that has been increasing in severity.  About a week later she developed increased shortness of breath and needed her albuterol.  She has been using this five or 6 times a day.  She has been wheezing particularly in the morning.    The albuterol does help with her symptoms but it causes her to be jittery.  She has continued to have shortness of breath.    She has had green mucus from her nose.    She is also coughing with a small amount of sputum.  She has not seen this but it is becoming thicker.    She has not had any fever.  She does have malaise.    Her daughter is also sick.    They are going to see [place] tomorrow night.    She had a tooth implant done in January.    [facility] ALLERGY QUESTIONNAIRE SHORT 3/19/2019   Are you taking any new medications since your last visit? No   Constitution: No changes since my last visit with this provider   Head or facial pain: Headaches, Sinus pressure   Eyes: Itching, Tearing, Contacts   Ears: Fullness, Pressure   Nose: Itching, Post nasal drip, Sniffling, Sneezing, Runny nose, Blocked nose   Throat: Sore throat, Frequent clearing, Reflux/ heartburn   Sinuses: No symptoms   Lungs: Cough, Wheezing, Asthma, Shortness of breath, Use of inhalers, Chest tightness   Skin: Itching   Cardiovascular: No symptoms   Gastrointestinal: Heartburn/ indigestion/ reflux   Genital/ urinary No symptoms   Musculoskeletal: Back pain   Neurologic: Headaches   Endocrine: No symptoms   Hematologic: No symptoms       [facility] ALLERGY QUESTIONNAIRE SHORT 9/10/2018   Are you taking any new medications since your last visit? No   Constitution: Fatigue   Head or facial pain: No changes since my last visit with this  provider   Eyes: No symptoms   Ears: Fullness, Pressure   Nose: No symptoms   Throat: No symptoms   Sinuses: No symptoms   Lungs: Cough, Wheezing, Asthma   Skin: No symptoms   Cardiovascular: Palpitations   Gastrointestinal: No symptoms   Genital/ urinary No symptoms   Musculoskeletal: Muscle pain, Joint pain   Neurologic: No symptoms   Endocrine: No symptoms   Hematologic: No symptoms     Physical Examination:  General: Well-developed, well-nourished, no acute distress.  Head: No facial tenderness.  Eyes: Conjunctivae: No conjunctival injection.  Ears: EAC's clear. TM's clear.   Nose: Nasal Mucosa: Pink. Septum: No apparent deviation. Turbinates: No significant edema. Polyps/Mass: None visible.  Teeth/Gums: No bleeding noted.  Oropharynx: White exudate on tonsils.   Neck: Supple without thyromegaly. No cervical lymphadenopathy.   Respiratory/Chest: Effort: Good. Auscultation:  Clear.  Skin: Good turgor. No urticaria or angioedema.  Neuro/Psych: Oriented x 3.     Inhalant skin tests 12/27/2001:  Positive cat, dust mites, trees, weeds, and grasses.    Spirometry 11/27/2009:  Normal.     Spirometry 11/27/2013: Normal.    Assessment:  1.  Allergic rhinitis, controlled.  2.  Allergic conjunctivitis, controlled.  3.  Allergic asthma,  with acute exacerbation.  4.  Viral URI with bronchitis.    Recommendations:  1.  Prednisone taper.  2.  Decrease albuterol when possible.  3.  Z-Albert.  4.  Tessalon Perles.  5.  Phenergan and codeine cough suppressant.  6.  Return to clinic in 1 to 2 weeks or sooner if symptoms are not well controlled.

## 2019-05-28 RX ORDER — ALBUTEROL SULFATE 90 UG/1
2 AEROSOL, METERED RESPIRATORY (INHALATION) EVERY 4 HOURS PRN
Qty: 1 INHALER | Refills: 3 | Status: SHIPPED | OUTPATIENT
Start: 2019-05-28 | End: 2019-12-02 | Stop reason: SDUPTHER

## 2019-05-29 ENCOUNTER — OFFICE VISIT (OUTPATIENT)
Dept: ALLERGY | Facility: CLINIC | Age: 44
End: 2019-05-29
Payer: COMMERCIAL

## 2019-05-29 DIAGNOSIS — J45.20 ASTHMA IN ADULT, MILD INTERMITTENT, UNCOMPLICATED: Primary | ICD-10-CM

## 2019-05-29 DIAGNOSIS — J30.9 ALLERGIC RHINITIS, UNSPECIFIED SEASONALITY, UNSPECIFIED TRIGGER: ICD-10-CM

## 2019-05-29 DIAGNOSIS — H10.13 ALLERGIC CONJUNCTIVITIS OF BOTH EYES: ICD-10-CM

## 2019-05-29 PROCEDURE — 99999 PR PBB SHADOW E&M-EST. PATIENT-LVL II: CPT | Mod: PBBFAC,,, | Performed by: ALLERGY & IMMUNOLOGY

## 2019-05-29 PROCEDURE — 99214 PR OFFICE/OUTPT VISIT, EST, LEVL IV, 30-39 MIN: ICD-10-PCS | Mod: S$GLB,,, | Performed by: ALLERGY & IMMUNOLOGY

## 2019-05-29 PROCEDURE — 99214 OFFICE O/P EST MOD 30 MIN: CPT | Mod: S$GLB,,, | Performed by: ALLERGY & IMMUNOLOGY

## 2019-05-29 PROCEDURE — 99999 PR PBB SHADOW E&M-EST. PATIENT-LVL II: ICD-10-PCS | Mod: PBBFAC,,, | Performed by: ALLERGY & IMMUNOLOGY

## 2019-05-29 RX ORDER — CETIRIZINE HYDROCHLORIDE 10 MG/1
10 TABLET ORAL DAILY
COMMUNITY

## 2019-05-29 NOTE — PROGRESS NOTES
Tete Plunkett returns to clinic today for continued evaluation of allergic rhinitis and asthma.  She was last seen March 19, 2019.  She is here alone.    At her last visit, she had an asthma exacerbation that required prednisone and antibiotics.      Since then her asthma has continued to be not well controlled.  She may use her albuterol several times a day.    She does think that her symptoms increase around her dog [place], a Jaydenu.  She will start wheezing if she plays a lot with him.  She has removed this from her bedroom.  It used to sleep in her bed.    She has also had increased rhinitis and conjunctivitis.  She has been taking Zyrtec with improvement.    She and her family or going out of town for two weeks to [facility].  The dog will not be coming with them.    [facility] ALLERGY QUESTIONNAIRE SHORT 5/29/2019   Are you taking any new medications since your last visit? Yes   Constitution: No changes since my last visit with this provider   Head or facial pain: Sinus pressure   Eyes: Itching, Tearing, Swelling, Redness, Eye discharge, Eye pain   Ears: No symptoms   Nose: No symptoms   Throat: No symptoms   Sinuses: No symptoms   Lungs: Cough, Wheezing, Asthma, Shortness of breath, Use of inhalers   Skin: No symptoms   Cardiovascular: No symptoms   Gastrointestinal: No symptoms   Genital/ urinary No symptoms   Musculoskeletal: No symptoms   Neurologic: No symptoms   Endocrine: No symptoms   Hematologic: No symptoms     Physical Examination:  General: Well-developed, well-nourished, no acute distress.  Head: No facial tenderness.  Eyes: Conjunctivae: No conjunctival injection.  Ears: EAC's clear. TM's clear.   Nose: Nasal Mucosa: Pink. Septum: No apparent deviation. Turbinates: No significant edema. Polyps/Mass: None visible.  Teeth/Gums: No bleeding noted.  Oropharynx: White exudate on tonsils.   Neck: Supple without thyromegaly. No cervical lymphadenopathy.   Respiratory/Chest: Effort: Good.  Auscultation:  Slight end expiratory wheeze in the right base that cleared with deep breathing.  Skin: Good turgor. No urticaria or angioedema.  Neuro/Psych: Oriented x 3.     Inhalant skin tests 12/27/2001:  Positive cat, dust mites, trees, weeds, and grasses.    Spirometry 11/27/2009:  Normal.     Spirometry 11/27/2013: Normal.    Assessment:  1.  Allergic rhinitis, not controlled  2.  Allergic conjunctivitis, not controlled  3.  Allergic asthma, not controlled  4.  Viral URI with bronchitis, resolved.    Recommendations:  1.  Lab work as ordered.  2.  Repeat spirometry.  3.  Observe symptoms while on vacation and away from dog.  4.  Observe symptoms on reoccurring the house.  She will notify me on [facility] or return to clinic  5.  Consider adding Breo.  6.  Consider SLIT.  7.  Follow-up after above.

## 2019-05-31 ENCOUNTER — HOSPITAL ENCOUNTER (OUTPATIENT)
Dept: PULMONOLOGY | Facility: CLINIC | Age: 44
Discharge: HOME | End: 2019-05-31
Payer: COMMERCIAL

## 2019-05-31 ENCOUNTER — PATIENT MESSAGE (OUTPATIENT)
Dept: ALLERGY | Facility: CLINIC | Age: 44
End: 2019-05-31

## 2019-05-31 ENCOUNTER — TELEPHONE (OUTPATIENT)
Dept: ALLERGY | Facility: CLINIC | Age: 44
End: 2019-05-31

## 2019-05-31 DIAGNOSIS — J45.20 ASTHMA IN ADULT, MILD INTERMITTENT, UNCOMPLICATED: ICD-10-CM

## 2019-05-31 LAB
POST FEV1 FVC: 0.78
POST FEV1: 2.42
POST FVC: 3.11
PRE FEV1 FVC: 77
PRE FEV1: 2.35
PRE FVC: 3.06
PREDICTED FEV1 FVC: 83
PREDICTED FEV1: 2.74
PREDICTED FVC: 3.29

## 2019-05-31 PROCEDURE — 94060 EVALUATION OF WHEEZING: CPT | Mod: S$GLB,,, | Performed by: INTERNAL MEDICINE

## 2019-05-31 PROCEDURE — 94060 PR EVAL OF BRONCHOSPASM: ICD-10-PCS | Mod: S$GLB,,, | Performed by: INTERNAL MEDICINE

## 2019-05-31 NOTE — TELEPHONE ENCOUNTER
Left message to notify patient that spirometry is done at the hospital and she can have labs drawn at the hospital also.

## 2019-06-24 ENCOUNTER — OFFICE VISIT (OUTPATIENT)
Dept: ALLERGY | Facility: CLINIC | Age: 44
End: 2019-06-24
Payer: COMMERCIAL

## 2019-06-24 VITALS
BODY MASS INDEX: 39.18 KG/M2 | DIASTOLIC BLOOD PRESSURE: 84 MMHG | SYSTOLIC BLOOD PRESSURE: 132 MMHG | WEIGHT: 229.5 LBS | HEIGHT: 64 IN

## 2019-06-24 DIAGNOSIS — J45.20 ASTHMA IN ADULT, MILD INTERMITTENT, UNCOMPLICATED: ICD-10-CM

## 2019-06-24 DIAGNOSIS — H10.13 ALLERGIC CONJUNCTIVITIS OF BOTH EYES: ICD-10-CM

## 2019-06-24 DIAGNOSIS — J30.9 ALLERGIC RHINITIS, UNSPECIFIED SEASONALITY, UNSPECIFIED TRIGGER: Primary | ICD-10-CM

## 2019-06-24 PROCEDURE — 3008F BODY MASS INDEX DOCD: CPT | Mod: CPTII,S$GLB,, | Performed by: ALLERGY & IMMUNOLOGY

## 2019-06-24 PROCEDURE — 99999 PR PBB SHADOW E&M-EST. PATIENT-LVL III: CPT | Mod: PBBFAC,,, | Performed by: ALLERGY & IMMUNOLOGY

## 2019-06-24 PROCEDURE — 99999 PR PBB SHADOW E&M-EST. PATIENT-LVL III: ICD-10-PCS | Mod: PBBFAC,,, | Performed by: ALLERGY & IMMUNOLOGY

## 2019-06-24 PROCEDURE — 99214 PR OFFICE/OUTPT VISIT, EST, LEVL IV, 30-39 MIN: ICD-10-PCS | Mod: S$GLB,,, | Performed by: ALLERGY & IMMUNOLOGY

## 2019-06-24 PROCEDURE — 99214 OFFICE O/P EST MOD 30 MIN: CPT | Mod: S$GLB,,, | Performed by: ALLERGY & IMMUNOLOGY

## 2019-06-24 PROCEDURE — 3008F PR BODY MASS INDEX (BMI) DOCUMENTED: ICD-10-PCS | Mod: CPTII,S$GLB,, | Performed by: ALLERGY & IMMUNOLOGY

## 2019-06-24 RX ORDER — FLUTICASONE FUROATE AND VILANTEROL 200; 25 UG/1; UG/1
1 POWDER RESPIRATORY (INHALATION) DAILY
Qty: 1 EACH | Refills: 5 | Status: SHIPPED | OUTPATIENT
Start: 2019-06-24 | End: 2019-12-02 | Stop reason: SDUPTHER

## 2019-06-24 NOTE — PROGRESS NOTES
Tete Plunkett returns to clinic today for continued evaluation of allergic rhinitis and asthma.  She was last seen May 29, 2019.  She is here alone.    After her last visit, she did go on vacation to [email or web address] and [place] with her family.  She was there about a week and then went to [place] for a week.    They stayed [place] that did have carpeting.     After they left, she continued to need Zyrtec for several days for her rhinitis.  She then discontinued it and did well until she returned home.    She continued to need her albuterol for about three days and then discontinued it.  She only needed it occasionally when she was away from the house.    They returned last Saturday.  Soon after entering the house she again became symptomatic with need for her albuterol.    The dog is now 6 years old.    There is only one throw rugs in the house.  The dog does stay a lot on a velvet couch where they spend a lot of their time.    She does not think that she will be able to get rid of the dog.    [facility] ALLERGY QUESTIONNAIRE SHORT 6/24/2019   Are you taking any new medications since your last visit? No   Constitution: No changes since my last visit with this provider   Head or facial pain: Headaches, Sinus pressure   Eyes: Itching, Tearing, Swelling   Ears: No symptoms   Nose: Sniffling, Sneezing, Blocked nose   Throat: No symptoms   Sinuses: No symptoms   Lungs: Cough, Wheezing, Asthma, Shortness of breath, Use of inhalers   Skin: No symptoms   Cardiovascular: No symptoms   Gastrointestinal: No symptoms   Genital/ urinary No symptoms   Musculoskeletal: No symptoms   Neurologic: No symptoms   Endocrine: No symptoms   Hematologic: No symptoms     Physical Examination:  General: Well-developed, well-nourished, no acute distress.  Skin: Good turgor. No urticaria or angioedema.  Neuro/Psych: Oriented x 3.     Inhalant skin tests 12/27/2001:  Positive cat, dust mites, trees, weeds, and  grasses.    Spirometry 11/27/2009:  Normal.     Spirometry 11/27/2013: Normal.    Laboratory 05/31/2019:  Class III:  Dust mites, pecan, dog.  Class II:  [place], oak, marsh elder, and cat.    Spirometry 05/31/2019:  Normal spirometry. Airflow not improved after bronchodilator.    Assessment:  1.  Allergic rhinitis, not controlled  2.  Allergic conjunctivitis, not controlled  3.  Allergic asthma, not controlled    Recommendations:  1.  Continue house dust mite avoidance procedures.  2.  Start Breo one had a lesion daily.  3.  Start fluticasone two sprays each nostril daily.  4.  Zyrtec as needed.  5.  Albuterol as needed.  6.  Return to clinic in 48 weeks or sooner if needed.  7.  SLIT discussed.    Allergic mechanisms and treatment options were reviewed in detail.  Immunotherapy with SLIT was reviewed.      40 minutes was spent with this patient, over half in counseling regarding allergic disease.

## 2019-07-01 ENCOUNTER — HOSPITAL ENCOUNTER (OUTPATIENT)
Dept: RADIOLOGY | Facility: OTHER | Age: 44
Discharge: HOME | End: 2019-07-01
Attending: OBSTETRICS & GYNECOLOGY
Payer: COMMERCIAL

## 2019-07-01 DIAGNOSIS — Z80.3 FH: BREAST CANCER: ICD-10-CM

## 2019-07-01 DIAGNOSIS — Z12.31 VISIT FOR SCREENING MAMMOGRAM: ICD-10-CM

## 2019-07-01 DIAGNOSIS — N60.19 DIFFUSE CYSTIC MASTOPATHY: ICD-10-CM

## 2019-07-01 PROCEDURE — 77063 MAMMO DIGITAL SCREENING BILAT WITH TOMOSYNTHESIS_CAD: ICD-10-PCS | Mod: 26,,, | Performed by: RADIOLOGY

## 2019-07-01 PROCEDURE — 77063 BREAST TOMOSYNTHESIS BI: CPT | Mod: 26,,, | Performed by: RADIOLOGY

## 2019-07-01 PROCEDURE — 77067 MAMMO DIGITAL SCREENING BILAT WITH TOMOSYNTHESIS_CAD: ICD-10-PCS | Mod: 26,,, | Performed by: RADIOLOGY

## 2019-07-01 PROCEDURE — 77067 SCR MAMMO BI INCL CAD: CPT | Mod: 26,,, | Performed by: RADIOLOGY

## 2019-07-01 PROCEDURE — 77067 SCR MAMMO BI INCL CAD: CPT | Mod: TC

## 2019-12-02 RX ORDER — FLUTICASONE FUROATE AND VILANTEROL 200; 25 UG/1; UG/1
1 POWDER RESPIRATORY (INHALATION) DAILY
Qty: 1 EACH | Refills: 5 | Status: SHIPPED | OUTPATIENT
Start: 2019-12-02 | End: 2021-11-01

## 2019-12-02 RX ORDER — ALBUTEROL SULFATE 90 UG/1
2 AEROSOL, METERED RESPIRATORY (INHALATION) EVERY 4 HOURS PRN
Qty: 1 INHALER | Refills: 3 | Status: SHIPPED | OUTPATIENT
Start: 2019-12-02 | End: 2020-03-16 | Stop reason: SDUPTHER

## 2020-03-16 ENCOUNTER — PATIENT MESSAGE (OUTPATIENT)
Dept: ALLERGY | Facility: CLINIC | Age: 45
End: 2020-03-16

## 2020-03-16 DIAGNOSIS — J45.20 ASTHMA IN ADULT, MILD INTERMITTENT, UNCOMPLICATED: Primary | ICD-10-CM

## 2020-03-16 RX ORDER — ALBUTEROL SULFATE 90 UG/1
2 AEROSOL, METERED RESPIRATORY (INHALATION) EVERY 4 HOURS PRN
Qty: 1 G | Refills: 3 | Status: CANCELLED | OUTPATIENT
Start: 2020-03-16

## 2020-03-16 RX ORDER — ALBUTEROL SULFATE 90 UG/1
2 AEROSOL, METERED RESPIRATORY (INHALATION) EVERY 4 HOURS PRN
Qty: 1 G | Refills: 3 | Status: SHIPPED | OUTPATIENT
Start: 2020-03-16 | End: 2020-09-01

## 2020-08-11 ENCOUNTER — OFFICE VISIT (OUTPATIENT)
Dept: GASTROENTEROLOGY | Facility: CLINIC | Age: 45
End: 2020-08-11
Payer: COMMERCIAL

## 2020-08-11 DIAGNOSIS — Z01.818 PREOP EXAMINATION: ICD-10-CM

## 2020-08-11 DIAGNOSIS — R10.9 ABDOMINAL CRAMPING: ICD-10-CM

## 2020-08-11 DIAGNOSIS — K62.5 RECTAL BLEEDING: Primary | ICD-10-CM

## 2020-08-11 PROCEDURE — 99203 OFFICE O/P NEW LOW 30 MIN: CPT | Mod: 95,,, | Performed by: INTERNAL MEDICINE

## 2020-08-11 PROCEDURE — 99203 PR OFFICE/OUTPT VISIT, NEW, LEVL III, 30-44 MIN: ICD-10-PCS | Mod: 95,,, | Performed by: INTERNAL MEDICINE

## 2020-08-11 RX ORDER — SODIUM, POTASSIUM,MAG SULFATES 17.5-3.13G
1 SOLUTION, RECONSTITUTED, ORAL ORAL DAILY
Qty: 1 KIT | Refills: 0 | Status: SHIPPED | OUTPATIENT
Start: 2020-08-11 | End: 2020-08-13

## 2020-08-11 NOTE — PROGRESS NOTES
Subjective:       Patient ID: Tete Liu is a 44 y.o. female.    Chief Complaint: Rectal Bleeding and Abdominal Cramping    The patient location is: [place]  The chief complaint leading to consultation is: rectal bleeding, abdominal cramping    Visit type: audiovisual    Face to Face time with patient: 20 mins  25 minutes of total time spent on the encounter, which includes face to face time and non-face to face time preparing to see the patient (eg, review of tests), Obtaining and/or reviewing separately obtained history, Documenting clinical information in the electronic or other health record, Independently interpreting results (not separately reported) and communicating results to the patient/family/caregiver, or Care coordination (not separately reported).     Each patient to whom he or she provides medical services by telemedicine is:  (1) informed of the relationship between the physician and patient and the respective role of any other health care provider with respect to management of the patient; and (2) notified that he or she may decline to receive medical services by telemedicine and may withdraw from such care at any time.    Notes:  45 yo F complains of rectal bleeding.  She states that approximately 2 weeks ago she had sudden onset of passage of blood per rectum.  The blood continued and was then seen mixed with stools.  It has improved, but she is still watching for it.  Prior to onset of bleeding she noted odd abdominal cramping.  She states that it does not particularly hurt, it is just an odd sensation.  It feels like a baby kicking or a bubbly sensation.  She has not experienced any change in her bowel pattern which is 2 BM per day.  Her father  of lung cancer, and there is no colon cancer in the family.  She has never had a colonoscopy.    Review of Systems   Constitutional: Negative for appetite change and unexpected weight change.   Eyes: Negative for photophobia and visual disturbance.    Respiratory: Negative for chest tightness, shortness of breath and wheezing.    Cardiovascular: Negative for chest pain, palpitations and leg swelling.   Gastrointestinal: Positive for blood in stool.   Genitourinary: Negative for dysuria, flank pain and hematuria.   Musculoskeletal: Negative for joint swelling and myalgias.   Integumentary:  Negative for color change and rash.   Neurological: Negative for dizziness and speech difficulty.   Psychiatric/Behavioral: Negative for confusion and hallucinations.     Objective:       Physical Exam  Constitutional:       Appearance: Normal appearance. She is well-developed.   HENT:      Head: Normocephalic and atraumatic.   Eyes:      Extraocular Movements: Extraocular movements intact.      Pupils: Pupils are equal, round, and reactive to light.   Neurological:      General: No focal deficit present.      Mental Status: She is alert and oriented to person, place, and time.   Psychiatric:         Behavior: Behavior normal.         Thought Content: Thought content normal.       Lab Results   Component Value Date    WBC 6.29 05/19/2018    HGB 13.5 05/19/2018    HCT 42.7 05/19/2018    MCV 80 (L) 05/19/2018     05/19/2018       Assessment:       1. Rectal bleeding    2. Abdominal cramping    3. Preop examination        Plan:       Rectal bleeding; Abdominal cramping  -     SUPREP BOWEL PREP KIT 17.5-3.13-1.6 gram SolR; Take 177 mLs by mouth once daily. for 2 days  Dispense: 1 kit; Refill: 0  -     Case request GI: COLONOSCOPY    Preop examination  -     COVID-19 Routine Screening; Future; Expected date: 08/18/2020, to be done at [facility]

## 2020-08-11 NOTE — PATIENT INSTRUCTIONS
SUPREP Instructions    You are scheduled for a colonoscopy with Dr. Antonio on Friday, September 11 at [facility] located at [address] in [place].  Enter through the South Entrance #2 (by the cafeteria).  Go straight back down the brown and check-in at Same Day Surgery.      You will receive a call 1-2 days before your colonoscopy to tell you the time to arrive.  If you have not received a call by the day before your procedure, call the Endoscopy Lab at [phone].    To ensure that your test is accurate and complete, you MUST follow these instructions listed below.  If you have any questions, please call our office at [phone].  Plan on being at the hospital for your procedure for 3-4 hours.    1.  Follow a CLEAR LIQUID DIET for the entire day before your scheduled colonoscopy.  This means no solid food the entire day starting when you wake.  You may have as much of the clear liquids as you want throughout the day.   CLEAR LIQUID DIET:   - Avoid Red, Orange, Purple, and/or Blue food coloring   - NO DAIRY   - You can have:  Coffee with sugar (no creamer), tea, water, soda, apple or white grape juice, chicken or beef broth/bouillon (no meat, noodles, or veggies), green/yellow popsicles, green/yellow Jell-O, lemonade.    2.  AT 5 pm the evening before your colonoscopy, POUR ONE (1) BOTTLE OF SUPREP INTO THE MIXING CONTAINER, PROVIDED INSIDE THE BOX.  ADD WATER TO THE LINE ON THE CONTAINER AND MIX IT WELL.  DRINK THE ENTIRE CONTAINER AND THEN DRINK TWO (2) MORE CONTAINERS OF WATER OVER THE NEXT 1 HOUR.  This is sometimes easier to drink if this solution is cold, so you can mix the solution 20 minutes ahead of time and place in the refrigerator prior to drinking.  You have to drink the solution within 30-45 minutes of mixing it.  Do NOT put this solution over ice.  It IS ok to drink with a straw.    3.  The endoscopy department will call you 1 day before your colonoscopy to tell you  the exact time to arrive, AND to tell you the exact time to drink the 2nd portion of your prep (which will be FIVE HOURS BEFORE YOUR ARRIVAL TIME).  At this time given to you, POUR ONE (1) BOTTLE OF SUPREP INTO THE MIXING CONTAINER, PROVIDED INSIDE THE BOX.  ADD WATER TO THE LINE ON THE CONTAINER AND MIX IT WELL.  DRINK THE ENTIRE CONTAINER AND THEN DRINK TWO (2) MORE CONTAINERS OF WATER OVER THE NEXT 1 HOUR.  This is sometimes easier to drink if this solution is cold, so you can mix the solution 20 minutes ahead of time and place in the refrigerator prior to drinking.  You have to drink the solution within 30-45 minutes of mixing it.  Do NOT put this solution over ice.  It IS ok to drink with a straw.  Once this is complete, you may not have ANYTHING else by mouth!    4.  You must have someone with you to DRIVE YOU HOME since you will be receiving IV sedation for the colonoscopy.    5.  It is ok to take MOST of your REGULAR MEDICATIONS  in the morning of your test with a SIP of water.  THE ONLY MEDS YOU NEED TO HOLD ARE YOUR DIABETES MEDICATIONS,  SOME BLOOD PRESSURE MEDS, AND BLOOD THINNERS IF OK'D BY YOUR DOCTOR.  Do NOT have anything else to eat or drink the morning of your colonoscopy.  It is ok to brush your teeth.    6.  If you are on blood thinners THAT YOU HAVE BEEN INSTRUCTED TO HOLD BY YOUR DOCTOR FOR THIS PROCEDURE, then do NOT take this the morning of your colonoscopy.  Do NOT stop these medications on your own, they must be approved to be held by your doctor.  Your colonoscopy can NOT be done if you are on these medications.  Examples of blood thinners include: Coumadin, Aggrenox, Plavix, Pradaxa, Reapro, Pletal, Xarelto, Ticagrelor, Brilinta, Eliquis, and high dose aspirin (325 mg).  You do not have to stop baby aspirin 81 mg.    7.  IF YOU ARE DIABETIC:  NO INSULIN OR ORAL MEDICATIONS THE MORNING OF THE COLONOSCOPY.  TAKE ONLY HALF THE DOSE OF YOUR INSULIN THE DAY BEFORE THE COLONOSCOPY.  DO NOT TAKE  ANY ORAL DIABETIC MEDICATIONS THE DAY BEFORE THE COLONOSCOPY.  IF YOU ARE AN INSULIN DEPENDENT DIABETIC WITH UNSTABLE BLOOD SUGARS, NOTIFY YOUR PRIMARY CARE PHYSICIAN FOR INSTRUCTIONS.

## 2020-09-24 ENCOUNTER — PATIENT MESSAGE (OUTPATIENT)
Dept: ALLERGY | Facility: CLINIC | Age: 45
End: 2020-09-24

## 2020-09-25 ENCOUNTER — TELEPHONE (OUTPATIENT)
Dept: ALLERGY | Facility: CLINIC | Age: 45
End: 2020-09-25

## 2020-09-25 NOTE — TELEPHONE ENCOUNTER
Called pt and asked if OK if Dr. Smart writes a note stating pt has asthma.  She said yes.  Asked us to mail it to her home.

## 2020-10-03 ENCOUNTER — LAB VISIT (OUTPATIENT)
Dept: SPORTS MEDICINE | Facility: CLINIC | Age: 45
End: 2020-10-03
Payer: COMMERCIAL

## 2020-10-03 DIAGNOSIS — Z01.818 PREOP EXAMINATION: ICD-10-CM

## 2020-10-03 PROCEDURE — U0003 INFECTIOUS AGENT DETECTION BY NUCLEIC ACID (DNA OR RNA); SEVERE ACUTE RESPIRATORY SYNDROME CORONAVIRUS 2 (SARS-COV-2) (CORONAVIRUS DISEASE [COVID-19]), AMPLIFIED PROBE TECHNIQUE, MAKING USE OF HIGH THROUGHPUT TECHNOLOGIES AS DESCRIBED BY CMS-2020-01-R: HCPCS

## 2020-10-04 LAB — SARS-COV-2 RNA RESP QL NAA+PROBE: NOT DETECTED

## 2020-10-07 ENCOUNTER — PATIENT MESSAGE (OUTPATIENT)
Dept: ADMINISTRATIVE | Facility: HOSPITAL | Age: 45
End: 2020-10-07

## 2020-12-11 ENCOUNTER — HOSPITAL ENCOUNTER (OUTPATIENT)
Dept: RADIOLOGY | Facility: OTHER | Age: 45
Discharge: HOME | End: 2020-12-11
Attending: OBSTETRICS & GYNECOLOGY
Payer: COMMERCIAL

## 2020-12-11 DIAGNOSIS — Z12.31 VISIT FOR SCREENING MAMMOGRAM: ICD-10-CM

## 2020-12-11 PROCEDURE — 77067 MAMMO DIGITAL SCREENING BILAT WITH TOMO: ICD-10-PCS | Mod: 26,,, | Performed by: RADIOLOGY

## 2020-12-11 PROCEDURE — 77063 MAMMO DIGITAL SCREENING BILAT WITH TOMO: ICD-10-PCS | Mod: 26,,, | Performed by: RADIOLOGY

## 2020-12-11 PROCEDURE — 77067 SCR MAMMO BI INCL CAD: CPT | Mod: 26,,, | Performed by: RADIOLOGY

## 2020-12-11 PROCEDURE — 77063 BREAST TOMOSYNTHESIS BI: CPT | Mod: 26,,, | Performed by: RADIOLOGY

## 2020-12-11 PROCEDURE — 77067 SCR MAMMO BI INCL CAD: CPT | Mod: TC

## 2020-12-21 ENCOUNTER — HOSPITAL ENCOUNTER (OUTPATIENT)
Dept: RADIOLOGY | Facility: HOSPITAL | Age: 45
Discharge: HOME | End: 2020-12-21
Attending: OBSTETRICS & GYNECOLOGY
Payer: COMMERCIAL

## 2020-12-21 DIAGNOSIS — R92.8 ABNORMAL MAMMOGRAM: ICD-10-CM

## 2020-12-21 PROCEDURE — 76642 ULTRASOUND BREAST LIMITED: CPT | Mod: 26,LT,, | Performed by: RADIOLOGY

## 2020-12-21 PROCEDURE — 76642 ULTRASOUND BREAST LIMITED: CPT | Mod: TC,LT

## 2020-12-21 PROCEDURE — 77065 DX MAMMO INCL CAD UNI: CPT | Mod: TC,LT

## 2020-12-21 PROCEDURE — 76642 US BREAST LEFT LIMITED: ICD-10-PCS | Mod: 26,LT,, | Performed by: RADIOLOGY

## 2020-12-21 PROCEDURE — 77061 BREAST TOMOSYNTHESIS UNI: CPT | Mod: TC,LT

## 2020-12-21 PROCEDURE — 77061 BREAST TOMOSYNTHESIS UNI: CPT | Mod: 26,LT,, | Performed by: RADIOLOGY

## 2020-12-21 PROCEDURE — 77065 MAMMO DIGITAL DIAGNOSTIC LEFT WITH TOMO: ICD-10-PCS | Mod: 26,LT,, | Performed by: RADIOLOGY

## 2020-12-21 PROCEDURE — 77061 MAMMO DIGITAL DIAGNOSTIC LEFT WITH TOMO: ICD-10-PCS | Mod: 26,LT,, | Performed by: RADIOLOGY

## 2020-12-21 PROCEDURE — 77065 DX MAMMO INCL CAD UNI: CPT | Mod: 26,LT,, | Performed by: RADIOLOGY

## 2021-01-04 ENCOUNTER — PATIENT MESSAGE (OUTPATIENT)
Dept: ADMINISTRATIVE | Facility: HOSPITAL | Age: 46
End: 2021-01-04

## 2021-02-27 ENCOUNTER — IMMUNIZATION (OUTPATIENT)
Dept: PHARMACY | Facility: CLINIC | Age: 46
End: 2021-02-27
Payer: COMMERCIAL

## 2021-02-27 DIAGNOSIS — Z23 NEED FOR VACCINATION: Primary | ICD-10-CM

## 2021-03-16 ENCOUNTER — PATIENT MESSAGE (OUTPATIENT)
Dept: ALLERGY | Facility: CLINIC | Age: 46
End: 2021-03-16

## 2021-03-27 ENCOUNTER — IMMUNIZATION (OUTPATIENT)
Dept: PHARMACY | Facility: CLINIC | Age: 46
End: 2021-03-27
Payer: COMMERCIAL

## 2021-03-27 DIAGNOSIS — Z23 NEED FOR VACCINATION: Primary | ICD-10-CM

## 2021-04-05 ENCOUNTER — PATIENT MESSAGE (OUTPATIENT)
Dept: ADMINISTRATIVE | Facility: HOSPITAL | Age: 46
End: 2021-04-05

## 2021-10-29 ENCOUNTER — TELEPHONE (OUTPATIENT)
Dept: ORTHOPEDICS | Facility: CLINIC | Age: 46
End: 2021-10-29
Payer: COMMERCIAL

## 2021-10-29 DIAGNOSIS — M79.645 FINGER PAIN, LEFT: Primary | ICD-10-CM

## 2021-11-01 ENCOUNTER — OFFICE VISIT (OUTPATIENT)
Dept: INTERNAL MEDICINE | Facility: CLINIC | Age: 46
End: 2021-11-01
Payer: COMMERCIAL

## 2021-11-01 ENCOUNTER — OFFICE VISIT (OUTPATIENT)
Dept: ORTHOPEDICS | Facility: CLINIC | Age: 46
End: 2021-11-01
Payer: COMMERCIAL

## 2021-11-01 VITALS
DIASTOLIC BLOOD PRESSURE: 78 MMHG | HEART RATE: 86 BPM | OXYGEN SATURATION: 97 % | WEIGHT: 205.5 LBS | HEIGHT: 65 IN | BODY MASS INDEX: 34.24 KG/M2 | SYSTOLIC BLOOD PRESSURE: 120 MMHG

## 2021-11-01 DIAGNOSIS — Z12.31 ENCOUNTER FOR SCREENING MAMMOGRAM FOR BREAST CANCER: ICD-10-CM

## 2021-11-01 DIAGNOSIS — Z00.00 HEALTHCARE MAINTENANCE: ICD-10-CM

## 2021-11-01 DIAGNOSIS — G56.22 CUBITAL TUNNEL SYNDROME ON LEFT: Primary | ICD-10-CM

## 2021-11-01 DIAGNOSIS — E66.9 CLASS 1 OBESITY WITH BODY MASS INDEX (BMI) OF 34.0 TO 34.9 IN ADULT, UNSPECIFIED OBESITY TYPE, UNSPECIFIED WHETHER SERIOUS COMORBIDITY PRESENT: ICD-10-CM

## 2021-11-01 DIAGNOSIS — J45.40 MODERATE PERSISTENT ASTHMA, UNSPECIFIED WHETHER COMPLICATED: Primary | ICD-10-CM

## 2021-11-01 DIAGNOSIS — J45.40 MODERATE PERSISTENT ASTHMA WITHOUT COMPLICATION: ICD-10-CM

## 2021-11-01 PROCEDURE — 99999 PR PBB SHADOW E&M-EST. PATIENT-LVL I: ICD-10-PCS | Mod: PBBFAC,,, | Performed by: ORTHOPAEDIC SURGERY

## 2021-11-01 PROCEDURE — 99999 PR PBB SHADOW E&M-EST. PATIENT-LVL I: CPT | Mod: PBBFAC,,, | Performed by: ORTHOPAEDIC SURGERY

## 2021-11-01 PROCEDURE — 99999 PR PBB SHADOW E&M-EST. PATIENT-LVL III: CPT | Mod: PBBFAC,,, | Performed by: STUDENT IN AN ORGANIZED HEALTH CARE EDUCATION/TRAINING PROGRAM

## 2021-11-01 PROCEDURE — 99999 PR PBB SHADOW E&M-EST. PATIENT-LVL III: ICD-10-PCS | Mod: PBBFAC,,, | Performed by: STUDENT IN AN ORGANIZED HEALTH CARE EDUCATION/TRAINING PROGRAM

## 2021-11-01 PROCEDURE — 99203 OFFICE O/P NEW LOW 30 MIN: CPT | Mod: S$GLB,,, | Performed by: STUDENT IN AN ORGANIZED HEALTH CARE EDUCATION/TRAINING PROGRAM

## 2021-11-01 PROCEDURE — 99203 PR OFFICE/OUTPT VISIT, NEW, LEVL III, 30-44 MIN: ICD-10-PCS | Mod: S$GLB,,, | Performed by: STUDENT IN AN ORGANIZED HEALTH CARE EDUCATION/TRAINING PROGRAM

## 2021-11-01 PROCEDURE — 99204 OFFICE O/P NEW MOD 45 MIN: CPT | Mod: S$GLB,,, | Performed by: ORTHOPAEDIC SURGERY

## 2021-11-01 PROCEDURE — 99204 PR OFFICE/OUTPT VISIT, NEW, LEVL IV, 45-59 MIN: ICD-10-PCS | Mod: S$GLB,,, | Performed by: ORTHOPAEDIC SURGERY

## 2021-11-01 RX ORDER — FLUTICASONE PROPIONATE AND SALMETEROL 100; 50 UG/1; UG/1
1 POWDER RESPIRATORY (INHALATION) 2 TIMES DAILY
Qty: 60 EACH | Refills: 11 | Status: SHIPPED | OUTPATIENT
Start: 2021-11-01 | End: 2023-01-19

## 2021-11-02 ENCOUNTER — LAB VISIT (OUTPATIENT)
Dept: LAB | Facility: HOSPITAL | Age: 46
End: 2021-11-02
Payer: COMMERCIAL

## 2021-11-02 DIAGNOSIS — Z00.00 HEALTHCARE MAINTENANCE: ICD-10-CM

## 2021-11-02 DIAGNOSIS — E66.9 CLASS 1 OBESITY WITH BODY MASS INDEX (BMI) OF 34.0 TO 34.9 IN ADULT, UNSPECIFIED OBESITY TYPE, UNSPECIFIED WHETHER SERIOUS COMORBIDITY PRESENT: ICD-10-CM

## 2021-11-02 LAB
ALBUMIN SERPL BCP-MCNC: 4.3 G/DL (ref 3.5–5.2)
ALP SERPL-CCNC: 144 U/L (ref 55–135)
ALT SERPL W/O P-5'-P-CCNC: 44 U/L (ref 10–44)
ANION GAP SERPL CALC-SCNC: 12 MMOL/L (ref 8–16)
AST SERPL-CCNC: 29 U/L (ref 10–40)
BASOPHILS # BLD AUTO: 0.07 K/UL (ref 0–0.2)
BASOPHILS NFR BLD: 1.1 % (ref 0–1.9)
BILIRUB SERPL-MCNC: 0.6 MG/DL (ref 0.1–1)
BUN SERPL-MCNC: 9 MG/DL (ref 6–20)
CALCIUM SERPL-MCNC: 10.1 MG/DL (ref 8.7–10.5)
CHLORIDE SERPL-SCNC: 98 MMOL/L (ref 95–110)
CHOLEST SERPL-MCNC: 251 MG/DL (ref 120–199)
CHOLEST/HDLC SERPL: 6.4 {RATIO} (ref 2–5)
CO2 SERPL-SCNC: 25 MMOL/L (ref 23–29)
CREAT SERPL-MCNC: 0.9 MG/DL (ref 0.5–1.4)
DIFFERENTIAL METHOD: ABNORMAL
EOSINOPHIL # BLD AUTO: 0.3 K/UL (ref 0–0.5)
EOSINOPHIL NFR BLD: 5.2 % (ref 0–8)
ERYTHROCYTE [DISTWIDTH] IN BLOOD BY AUTOMATED COUNT: 12.3 % (ref 11.5–14.5)
EST. GFR  (AFRICAN AMERICAN): >60 ML/MIN/1.73 M^2
EST. GFR  (NON AFRICAN AMERICAN): >60 ML/MIN/1.73 M^2
ESTIMATED AVG GLUCOSE: ABNORMAL MG/DL (ref 68–131)
GLUCOSE SERPL-MCNC: 383 MG/DL (ref 70–110)
HBA1C MFR BLD: >14 % (ref 4–5.6)
HCT VFR BLD AUTO: 48.3 % (ref 37–48.5)
HDLC SERPL-MCNC: 39 MG/DL (ref 40–75)
HDLC SERPL: 15.5 % (ref 20–50)
HGB BLD-MCNC: 16.3 G/DL (ref 12–16)
HIV 1+2 AB+HIV1 P24 AG SERPL QL IA: NEGATIVE
IMM GRANULOCYTES # BLD AUTO: 0.02 K/UL (ref 0–0.04)
IMM GRANULOCYTES NFR BLD AUTO: 0.3 % (ref 0–0.5)
LDLC SERPL CALC-MCNC: ABNORMAL MG/DL (ref 63–159)
LYMPHOCYTES # BLD AUTO: 2.3 K/UL (ref 1–4.8)
LYMPHOCYTES NFR BLD: 34.5 % (ref 18–48)
MCH RBC QN AUTO: 28.6 PG (ref 27–31)
MCHC RBC AUTO-ENTMCNC: 33.7 G/DL (ref 32–36)
MCV RBC AUTO: 85 FL (ref 82–98)
MONOCYTES # BLD AUTO: 0.4 K/UL (ref 0.3–1)
MONOCYTES NFR BLD: 6.1 % (ref 4–15)
NEUTROPHILS # BLD AUTO: 3.5 K/UL (ref 1.8–7.7)
NEUTROPHILS NFR BLD: 52.8 % (ref 38–73)
NONHDLC SERPL-MCNC: 212 MG/DL
NRBC BLD-RTO: 0 /100 WBC
PLATELET # BLD AUTO: 171 K/UL (ref 150–450)
PMV BLD AUTO: 13.4 FL (ref 9.2–12.9)
POTASSIUM SERPL-SCNC: 4.1 MMOL/L (ref 3.5–5.1)
PROT SERPL-MCNC: 7.4 G/DL (ref 6–8.4)
RBC # BLD AUTO: 5.69 M/UL (ref 4–5.4)
SODIUM SERPL-SCNC: 135 MMOL/L (ref 136–145)
TRIGL SERPL-MCNC: 426 MG/DL (ref 30–150)
TSH SERPL DL<=0.005 MIU/L-ACNC: 1.55 UIU/ML (ref 0.4–4)
WBC # BLD AUTO: 6.6 K/UL (ref 3.9–12.7)

## 2021-11-02 PROCEDURE — 83036 HEMOGLOBIN GLYCOSYLATED A1C: CPT | Performed by: STUDENT IN AN ORGANIZED HEALTH CARE EDUCATION/TRAINING PROGRAM

## 2021-11-02 PROCEDURE — 80053 COMPREHEN METABOLIC PANEL: CPT | Performed by: STUDENT IN AN ORGANIZED HEALTH CARE EDUCATION/TRAINING PROGRAM

## 2021-11-02 PROCEDURE — 87389 HIV-1 AG W/HIV-1&-2 AB AG IA: CPT | Performed by: STUDENT IN AN ORGANIZED HEALTH CARE EDUCATION/TRAINING PROGRAM

## 2021-11-02 PROCEDURE — 80061 LIPID PANEL: CPT | Performed by: STUDENT IN AN ORGANIZED HEALTH CARE EDUCATION/TRAINING PROGRAM

## 2021-11-02 PROCEDURE — 84443 ASSAY THYROID STIM HORMONE: CPT | Performed by: STUDENT IN AN ORGANIZED HEALTH CARE EDUCATION/TRAINING PROGRAM

## 2021-11-02 PROCEDURE — 85025 COMPLETE CBC W/AUTO DIFF WBC: CPT | Performed by: STUDENT IN AN ORGANIZED HEALTH CARE EDUCATION/TRAINING PROGRAM

## 2021-11-02 PROCEDURE — 36415 COLL VENOUS BLD VENIPUNCTURE: CPT | Performed by: STUDENT IN AN ORGANIZED HEALTH CARE EDUCATION/TRAINING PROGRAM

## 2021-11-03 ENCOUNTER — TELEPHONE (OUTPATIENT)
Dept: INTERNAL MEDICINE | Facility: CLINIC | Age: 46
End: 2021-11-03
Payer: COMMERCIAL

## 2021-11-03 ENCOUNTER — PATIENT MESSAGE (OUTPATIENT)
Dept: INTERNAL MEDICINE | Facility: CLINIC | Age: 46
End: 2021-11-03
Payer: COMMERCIAL

## 2021-11-03 DIAGNOSIS — E11.65 TYPE 2 DIABETES MELLITUS WITH HYPERGLYCEMIA, WITHOUT LONG-TERM CURRENT USE OF INSULIN: Primary | ICD-10-CM

## 2021-11-03 RX ORDER — PEN NEEDLE, DIABETIC 30 GX3/16"
1 NEEDLE, DISPOSABLE MISCELLANEOUS
Qty: 200 EACH | Refills: 11 | Status: SHIPPED | OUTPATIENT
Start: 2021-11-03 | End: 2022-05-02

## 2021-11-03 RX ORDER — INSULIN PUMP SYRINGE, 3 ML
EACH MISCELLANEOUS
Qty: 1 EACH | Refills: 0 | Status: SHIPPED | OUTPATIENT
Start: 2021-11-03 | End: 2023-12-08

## 2021-11-03 RX ORDER — INSULIN GLARGINE 100 [IU]/ML
20 INJECTION, SOLUTION SUBCUTANEOUS NIGHTLY
Qty: 2 EACH | Refills: 11 | Status: SHIPPED | OUTPATIENT
Start: 2021-11-03 | End: 2021-11-09

## 2021-11-03 RX ORDER — ATORVASTATIN CALCIUM 40 MG/1
40 TABLET, FILM COATED ORAL DAILY
Qty: 30 TABLET | Refills: 11 | Status: SHIPPED | OUTPATIENT
Start: 2021-11-03 | End: 2023-05-24

## 2021-11-03 RX ORDER — LANCETS
EACH MISCELLANEOUS
Qty: 200 EACH | Refills: 11 | Status: SHIPPED | OUTPATIENT
Start: 2021-11-03 | End: 2023-12-05

## 2021-11-03 RX ORDER — METFORMIN HYDROCHLORIDE 500 MG/1
1000 TABLET, EXTENDED RELEASE ORAL 2 TIMES DAILY WITH MEALS
Qty: 120 TABLET | Refills: 3 | Status: SHIPPED | OUTPATIENT
Start: 2021-11-03 | End: 2022-03-22 | Stop reason: SDUPTHER

## 2021-11-05 ENCOUNTER — TELEPHONE (OUTPATIENT)
Dept: INTERNAL MEDICINE | Facility: CLINIC | Age: 46
End: 2021-11-05
Payer: COMMERCIAL

## 2021-11-05 PROBLEM — Z71.89 COUNSELING AND COORDINATION OF CARE: Status: ACTIVE | Noted: 2021-11-01

## 2021-11-05 PROBLEM — E11.9 NEW ONSET TYPE 2 DIABETES MELLITUS: Status: ACTIVE | Noted: 2021-11-05

## 2021-11-05 PROBLEM — E66.9 OBESITY (BMI 30-39.9): Status: ACTIVE | Noted: 2021-11-05

## 2021-11-09 ENCOUNTER — OFFICE VISIT (OUTPATIENT)
Dept: INTERNAL MEDICINE | Facility: CLINIC | Age: 46
End: 2021-11-09
Payer: COMMERCIAL

## 2021-11-09 ENCOUNTER — TELEPHONE (OUTPATIENT)
Dept: DIABETES | Facility: CLINIC | Age: 46
End: 2021-11-09
Payer: COMMERCIAL

## 2021-11-09 ENCOUNTER — TELEPHONE (OUTPATIENT)
Dept: INTERNAL MEDICINE | Facility: CLINIC | Age: 46
End: 2021-11-09

## 2021-11-09 VITALS
BODY MASS INDEX: 34.31 KG/M2 | SYSTOLIC BLOOD PRESSURE: 117 MMHG | HEIGHT: 65 IN | DIASTOLIC BLOOD PRESSURE: 81 MMHG | HEART RATE: 71 BPM | OXYGEN SATURATION: 99 % | WEIGHT: 205.94 LBS

## 2021-11-09 DIAGNOSIS — E11.9 NEW ONSET TYPE 2 DIABETES MELLITUS: Primary | ICD-10-CM

## 2021-11-09 DIAGNOSIS — Z71.89 COUNSELING AND COORDINATION OF CARE: ICD-10-CM

## 2021-11-09 DIAGNOSIS — E11.65 TYPE 2 DIABETES MELLITUS WITH HYPERGLYCEMIA, WITHOUT LONG-TERM CURRENT USE OF INSULIN: ICD-10-CM

## 2021-11-09 DIAGNOSIS — E66.9 OBESITY (BMI 30-39.9): ICD-10-CM

## 2021-11-09 DIAGNOSIS — Z12.31 ENCOUNTER FOR SCREENING MAMMOGRAM FOR MALIGNANT NEOPLASM OF BREAST: ICD-10-CM

## 2021-11-09 PROCEDURE — 1160F PR REVIEW ALL MEDS BY PRESCRIBER/CLIN PHARMACIST DOCUMENTED: ICD-10-PCS | Mod: CPTII,S$GLB,, | Performed by: NURSE PRACTITIONER

## 2021-11-09 PROCEDURE — 3046F HEMOGLOBIN A1C LEVEL >9.0%: CPT | Mod: CPTII,S$GLB,, | Performed by: NURSE PRACTITIONER

## 2021-11-09 PROCEDURE — 3008F BODY MASS INDEX DOCD: CPT | Mod: CPTII,S$GLB,, | Performed by: NURSE PRACTITIONER

## 2021-11-09 PROCEDURE — 3046F PR MOST RECENT HEMOGLOBIN A1C LEVEL > 9.0%: ICD-10-PCS | Mod: CPTII,S$GLB,, | Performed by: NURSE PRACTITIONER

## 2021-11-09 PROCEDURE — 1159F PR MEDICATION LIST DOCUMENTED IN MEDICAL RECORD: ICD-10-PCS | Mod: CPTII,S$GLB,, | Performed by: NURSE PRACTITIONER

## 2021-11-09 PROCEDURE — 99999 PR PBB SHADOW E&M-EST. PATIENT-LVL V: ICD-10-PCS | Mod: PBBFAC,,, | Performed by: NURSE PRACTITIONER

## 2021-11-09 PROCEDURE — 3074F PR MOST RECENT SYSTOLIC BLOOD PRESSURE < 130 MM HG: ICD-10-PCS | Mod: CPTII,S$GLB,, | Performed by: NURSE PRACTITIONER

## 2021-11-09 PROCEDURE — 1160F RVW MEDS BY RX/DR IN RCRD: CPT | Mod: CPTII,S$GLB,, | Performed by: NURSE PRACTITIONER

## 2021-11-09 PROCEDURE — 3079F DIAST BP 80-89 MM HG: CPT | Mod: CPTII,S$GLB,, | Performed by: NURSE PRACTITIONER

## 2021-11-09 PROCEDURE — 3079F PR MOST RECENT DIASTOLIC BLOOD PRESSURE 80-89 MM HG: ICD-10-PCS | Mod: CPTII,S$GLB,, | Performed by: NURSE PRACTITIONER

## 2021-11-09 PROCEDURE — 1159F MED LIST DOCD IN RCRD: CPT | Mod: CPTII,S$GLB,, | Performed by: NURSE PRACTITIONER

## 2021-11-09 PROCEDURE — 99999 PR PBB SHADOW E&M-EST. PATIENT-LVL V: CPT | Mod: PBBFAC,,, | Performed by: NURSE PRACTITIONER

## 2021-11-09 PROCEDURE — 99215 PR OFFICE/OUTPT VISIT, EST, LEVL V, 40-54 MIN: ICD-10-PCS | Mod: S$GLB,,, | Performed by: NURSE PRACTITIONER

## 2021-11-09 PROCEDURE — 3008F PR BODY MASS INDEX (BMI) DOCUMENTED: ICD-10-PCS | Mod: CPTII,S$GLB,, | Performed by: NURSE PRACTITIONER

## 2021-11-09 PROCEDURE — 99215 OFFICE O/P EST HI 40 MIN: CPT | Mod: S$GLB,,, | Performed by: NURSE PRACTITIONER

## 2021-11-09 PROCEDURE — 3074F SYST BP LT 130 MM HG: CPT | Mod: CPTII,S$GLB,, | Performed by: NURSE PRACTITIONER

## 2021-11-09 RX ORDER — INSULIN GLARGINE 100 [IU]/ML
30 INJECTION, SOLUTION SUBCUTANEOUS NIGHTLY
Qty: 15 ML | Refills: 11
Start: 2021-11-09 | End: 2022-02-10

## 2021-11-13 ENCOUNTER — PATIENT MESSAGE (OUTPATIENT)
Dept: INTERNAL MEDICINE | Facility: CLINIC | Age: 46
End: 2021-11-13
Payer: COMMERCIAL

## 2021-11-22 ENCOUNTER — PATIENT MESSAGE (OUTPATIENT)
Dept: INTERNAL MEDICINE | Facility: CLINIC | Age: 46
End: 2021-11-22
Payer: COMMERCIAL

## 2021-12-06 ENCOUNTER — LAB VISIT (OUTPATIENT)
Dept: LAB | Facility: HOSPITAL | Age: 46
End: 2021-12-06
Payer: COMMERCIAL

## 2021-12-06 ENCOUNTER — OFFICE VISIT (OUTPATIENT)
Dept: INTERNAL MEDICINE | Facility: CLINIC | Age: 46
End: 2021-12-06
Payer: COMMERCIAL

## 2021-12-06 VITALS
BODY MASS INDEX: 33.86 KG/M2 | DIASTOLIC BLOOD PRESSURE: 88 MMHG | SYSTOLIC BLOOD PRESSURE: 124 MMHG | HEART RATE: 78 BPM | HEIGHT: 65 IN | WEIGHT: 203.25 LBS

## 2021-12-06 DIAGNOSIS — E55.9 VITAMIN D DEFICIENCY: ICD-10-CM

## 2021-12-06 DIAGNOSIS — R00.2 PALPITATION: ICD-10-CM

## 2021-12-06 DIAGNOSIS — G89.29 CHRONIC LEFT SHOULDER PAIN: ICD-10-CM

## 2021-12-06 DIAGNOSIS — J45.40 MODERATE PERSISTENT ASTHMA WITHOUT COMPLICATION: ICD-10-CM

## 2021-12-06 DIAGNOSIS — E11.65 TYPE 2 DIABETES MELLITUS WITH HYPERGLYCEMIA, WITHOUT LONG-TERM CURRENT USE OF INSULIN: Primary | ICD-10-CM

## 2021-12-06 DIAGNOSIS — M25.512 CHRONIC LEFT SHOULDER PAIN: ICD-10-CM

## 2021-12-06 DIAGNOSIS — E66.9 OBESITY (BMI 30-39.9): ICD-10-CM

## 2021-12-06 DIAGNOSIS — Z00.00 HEALTHCARE MAINTENANCE: ICD-10-CM

## 2021-12-06 DIAGNOSIS — M25.559 HIP PAIN: ICD-10-CM

## 2021-12-06 DIAGNOSIS — E11.65 TYPE 2 DIABETES MELLITUS WITH HYPERGLYCEMIA, WITHOUT LONG-TERM CURRENT USE OF INSULIN: ICD-10-CM

## 2021-12-06 PROBLEM — E11.29 DM (DIABETES MELLITUS), TYPE 2 WITH RENAL COMPLICATIONS: Status: ACTIVE | Noted: 2021-11-05

## 2021-12-06 LAB
ALBUMIN/CREAT UR: 10.8 UG/MG (ref 0–30)
CREAT UR-MCNC: 167 MG/DL (ref 15–325)
MICROALBUMIN UR DL<=1MG/L-MCNC: 18 UG/ML

## 2021-12-06 PROCEDURE — 82570 ASSAY OF URINE CREATININE: CPT | Performed by: STUDENT IN AN ORGANIZED HEALTH CARE EDUCATION/TRAINING PROGRAM

## 2021-12-06 PROCEDURE — 99213 OFFICE O/P EST LOW 20 MIN: CPT | Mod: S$GLB,,, | Performed by: STUDENT IN AN ORGANIZED HEALTH CARE EDUCATION/TRAINING PROGRAM

## 2021-12-06 PROCEDURE — 99999 PR PBB SHADOW E&M-EST. PATIENT-LVL III: CPT | Mod: PBBFAC,,, | Performed by: STUDENT IN AN ORGANIZED HEALTH CARE EDUCATION/TRAINING PROGRAM

## 2021-12-06 PROCEDURE — 99213 PR OFFICE/OUTPT VISIT, EST, LEVL III, 20-29 MIN: ICD-10-PCS | Mod: S$GLB,,, | Performed by: STUDENT IN AN ORGANIZED HEALTH CARE EDUCATION/TRAINING PROGRAM

## 2021-12-06 PROCEDURE — 99999 PR PBB SHADOW E&M-EST. PATIENT-LVL III: ICD-10-PCS | Mod: PBBFAC,,, | Performed by: STUDENT IN AN ORGANIZED HEALTH CARE EDUCATION/TRAINING PROGRAM

## 2021-12-06 RX ORDER — SEMAGLUTIDE 1.34 MG/ML
INJECTION, SOLUTION SUBCUTANEOUS
Qty: 1 PEN | Refills: 6 | Status: SHIPPED | OUTPATIENT
Start: 2021-12-06 | End: 2021-12-07

## 2021-12-07 ENCOUNTER — PATIENT MESSAGE (OUTPATIENT)
Dept: INTERNAL MEDICINE | Facility: CLINIC | Age: 46
End: 2021-12-07
Payer: COMMERCIAL

## 2021-12-07 ENCOUNTER — TELEPHONE (OUTPATIENT)
Dept: PHARMACY | Facility: CLINIC | Age: 46
End: 2021-12-07
Payer: COMMERCIAL

## 2021-12-07 DIAGNOSIS — E11.65 TYPE 2 DIABETES MELLITUS WITH HYPERGLYCEMIA, WITHOUT LONG-TERM CURRENT USE OF INSULIN: Primary | ICD-10-CM

## 2021-12-07 DIAGNOSIS — E66.9 OBESITY (BMI 30-39.9): ICD-10-CM

## 2021-12-07 RX ORDER — SEMAGLUTIDE 1.34 MG/ML
0.25 INJECTION, SOLUTION SUBCUTANEOUS
Qty: 1 PEN | Refills: 5 | Status: SHIPPED | OUTPATIENT
Start: 2021-12-07 | End: 2022-02-11 | Stop reason: SDUPTHER

## 2021-12-08 ENCOUNTER — IMMUNIZATION (OUTPATIENT)
Dept: INTERNAL MEDICINE | Facility: CLINIC | Age: 46
End: 2021-12-08
Payer: COMMERCIAL

## 2021-12-08 DIAGNOSIS — Z23 NEED FOR VACCINATION: Primary | ICD-10-CM

## 2021-12-08 PROCEDURE — 0064A COVID-19, MRNA, LNP-S, PF, 100 MCG/0.25 ML DOSE VACCINE (MODERNA BOOSTER): CPT | Mod: CV19,PBBFAC | Performed by: INTERNAL MEDICINE

## 2021-12-12 ENCOUNTER — TELEPHONE (OUTPATIENT)
Dept: HEPATOLOGY | Facility: HOSPITAL | Age: 46
End: 2021-12-12
Payer: COMMERCIAL

## 2021-12-12 ENCOUNTER — PATIENT MESSAGE (OUTPATIENT)
Dept: INTERNAL MEDICINE | Facility: CLINIC | Age: 46
End: 2021-12-12
Payer: COMMERCIAL

## 2021-12-17 ENCOUNTER — TELEPHONE (OUTPATIENT)
Dept: ADMINISTRATIVE | Facility: HOSPITAL | Age: 46
End: 2021-12-17
Payer: COMMERCIAL

## 2021-12-26 ENCOUNTER — PATIENT MESSAGE (OUTPATIENT)
Dept: INTERNAL MEDICINE | Facility: CLINIC | Age: 46
End: 2021-12-26
Payer: COMMERCIAL

## 2022-01-31 ENCOUNTER — PATIENT MESSAGE (OUTPATIENT)
Dept: INTERNAL MEDICINE | Facility: CLINIC | Age: 47
End: 2022-01-31
Payer: COMMERCIAL

## 2022-02-08 ENCOUNTER — LAB VISIT (OUTPATIENT)
Dept: LAB | Facility: HOSPITAL | Age: 47
End: 2022-02-08
Attending: NURSE PRACTITIONER
Payer: COMMERCIAL

## 2022-02-08 DIAGNOSIS — E55.9 VITAMIN D DEFICIENCY: ICD-10-CM

## 2022-02-08 DIAGNOSIS — E11.9 NEW ONSET TYPE 2 DIABETES MELLITUS: ICD-10-CM

## 2022-02-08 DIAGNOSIS — Z00.00 HEALTHCARE MAINTENANCE: ICD-10-CM

## 2022-02-08 DIAGNOSIS — E11.65 TYPE 2 DIABETES MELLITUS WITH HYPERGLYCEMIA, WITHOUT LONG-TERM CURRENT USE OF INSULIN: ICD-10-CM

## 2022-02-08 LAB
25(OH)D3+25(OH)D2 SERPL-MCNC: 34 NG/ML (ref 30–96)
ANION GAP SERPL CALC-SCNC: 9 MMOL/L (ref 8–16)
BUN SERPL-MCNC: 8 MG/DL (ref 6–20)
CALCIUM SERPL-MCNC: 10 MG/DL (ref 8.7–10.5)
CHLORIDE SERPL-SCNC: 102 MMOL/L (ref 95–110)
CHOLEST SERPL-MCNC: 113 MG/DL (ref 120–199)
CHOLEST/HDLC SERPL: 3.1 {RATIO} (ref 2–5)
CO2 SERPL-SCNC: 28 MMOL/L (ref 23–29)
CREAT SERPL-MCNC: 0.7 MG/DL (ref 0.5–1.4)
EST. GFR  (AFRICAN AMERICAN): >60 ML/MIN/1.73 M^2
EST. GFR  (NON AFRICAN AMERICAN): >60 ML/MIN/1.73 M^2
ESTIMATED AVG GLUCOSE: 143 MG/DL (ref 68–131)
GLUCOSE SERPL-MCNC: 108 MG/DL (ref 70–110)
HBA1C MFR BLD: 6.6 % (ref 4–5.6)
HDLC SERPL-MCNC: 37 MG/DL (ref 40–75)
HDLC SERPL: 32.7 % (ref 20–50)
LDLC SERPL CALC-MCNC: 56.6 MG/DL (ref 63–159)
NONHDLC SERPL-MCNC: 76 MG/DL
POTASSIUM SERPL-SCNC: 3.8 MMOL/L (ref 3.5–5.1)
SODIUM SERPL-SCNC: 139 MMOL/L (ref 136–145)
TRIGL SERPL-MCNC: 97 MG/DL (ref 30–150)

## 2022-02-08 PROCEDURE — 82306 VITAMIN D 25 HYDROXY: CPT | Performed by: STUDENT IN AN ORGANIZED HEALTH CARE EDUCATION/TRAINING PROGRAM

## 2022-02-08 PROCEDURE — 36415 COLL VENOUS BLD VENIPUNCTURE: CPT | Performed by: STUDENT IN AN ORGANIZED HEALTH CARE EDUCATION/TRAINING PROGRAM

## 2022-02-08 PROCEDURE — 80048 BASIC METABOLIC PNL TOTAL CA: CPT | Performed by: NURSE PRACTITIONER

## 2022-02-08 PROCEDURE — 83036 HEMOGLOBIN GLYCOSYLATED A1C: CPT | Performed by: NURSE PRACTITIONER

## 2022-02-08 PROCEDURE — 80061 LIPID PANEL: CPT | Performed by: NURSE PRACTITIONER

## 2022-02-09 NOTE — PROGRESS NOTES
CHIEF COMPLAINT: Type 2 Diabetes     HPI: Mrs. Tete Liu is a 46 y.o. female who was diagnosed with Type 2 DM in 11/2/2021.   Past Medical History:   Diagnosis Date    Allergy     Asthma     adult onset    Type 2 diabetes mellitus     diagnosed 11/2021     a1c 6.3% 3 years ago  New onset    Colonoscopy recently, gi issue in the past 6 months   Much controlled with medications: ozempic 0.5 mg weekly, metformin 1000 mg bid , lantus 30 units at night---cut back over time to 15 units     a1c went from >14% to 6.6%     Has interest in cgm dexcom g6     walking 2 miles a day  Lab Results   Component Value Date    HGBA1C 6.6 (H) 02/08/2022     Started insulin per pcp in past 4 months   Less fatigue, more energy, sleeping better    BG trends in gina -----------   mg/dl     Dietary habits:  Working on carb intake    Ground turkey   Chicken  No bread  [place]   charli seeds  Fruits - berries, apples    Diabetes code, intermittent- interest    Exercise: 5000 steps intentional   Works from desk   Walks dog      PREVIOUS DIABETES MEDICATIONS TRIED  lantus   Metformin   ozempic    CURRENT DIABETIC MEDS: metformin 1000 mg bid, lantus 15 units at night, ozempic 0.5 mg weekly     On injection 1 x a day  Makes frequent changes to his/her insulin regimen on the basis of blood glucose data  Testing 4 x a day max  Patient is willing and able to use the device  Demonstrated an understanding of the technology and is motivated to use CGM  Patient expected to adhere to a comprehensive diabetes treatment plan and patient has adequate medical supervision  Has multiple impaired awareness of hypoglycemia (hypoglycemia unawareness)      Diabetes Management Status    Statin: Taking  ACE/ARB: Not taking    Screening or Prevention Patient's value Goal Complete/Controlled?   HgA1C Testing and Control   Lab Results   Component Value Date    HGBA1C 6.6 (H) 02/08/2022      Annually/Less than 8% Yes   Lipid profile : 02/08/2022 Annually Yes  "  LDL control Lab Results   Component Value Date    LDLCALC 56.6 (L) 02/08/2022    Annually/Less than 100 mg/dl  Yes   Nephropathy screening Lab Results   Component Value Date    LABMICR 18.0 12/06/2021     Lab Results   Component Value Date    PROTEINUA Trace (A) 07/06/2017    Annually No   Blood pressure BP Readings from Last 1 Encounters:   12/06/21 124/88    Less than 140/90 Yes   Dilated retinal exam Most Recent Eye Exam Date: Not Found Annually Yes   Foot exam   : 12/06/2021 Annually Yes     REVIEW OF SYSTEMS  General: no weakness, fatigue, +weight changes 8# loss (over the past 4 months).   (L) lymph node enlarged- improved  Eyes: no double or blurred vision, eye pain, or redness  Cardiovascular: no chest pain, palpitations, edema, or murmurs.   Respiratory: no cough or dyspnea. +asthma  GI: no heartburn, nausea, or changes in bowel patterns; good appetite. +flatulence , +constipation at times  Skin: no rashes, dryness, itching, or reactions at insulin injection sites.  Neuro: no numbness, tingling, tremors, or vertigo. (L) hand pain-radiates to other digits (since 5th digit trauma)  Endocrine: no polyuria, polydipsia, polyphagia, heat or cold intolerance.     Vital Signs  /88   Ht 5' 5" (1.651 m)   Wt 89.4 kg (197 lb 1.5 oz)   BMI 32.80 kg/m²     Hemoglobin A1C   Date Value Ref Range Status   02/08/2022 6.6 (H) 4.0 - 5.6 % Final     Comment:     ADA Screening Guidelines:  5.7-6.4%  Consistent with prediabetes  >or=6.5%  Consistent with diabetes    High levels of fetal hemoglobin interfere with the HbA1C  assay. Heterozygous hemoglobin variants (HbS, HgC, etc)do  not significantly interfere with this assay.   However, presence of multiple variants may affect accuracy.     11/02/2021 >14.0 (H) 4.0 - 5.6 % Final     Comment:     ADA Screening Guidelines:  5.7-6.4%  Consistent with prediabetes  >or=6.5%  Consistent with diabetes    High levels of fetal hemoglobin interfere with the HbA1C  assay. " Heterozygous hemoglobin variants (HbS, HgC, etc)do  not significantly interfere with this assay.   However, presence of multiple variants may affect accuracy.     05/19/2018 6.3 (H) 4.0 - 5.6 % Final     Comment:     According to ADA guidelines, hemoglobin A1c <7.0% represents  optimal control in non-pregnant diabetic patients. Different  metrics may apply to specific patient populations.   Standards of Medical Care in Diabetes-2016.  For the purpose of screening for the presence of diabetes:  <5.7%     Consistent with the absence of diabetes  5.7-6.4%  Consistent with increasing risk for diabetes   (prediabetes)  >or=6.5%  Consistent with diabetes  Currently, no consensus exists for use of hemoglobin A1c  for diagnosis of diabetes for children.  This Hemoglobin A1c assay has significant interference with fetal   hemoglobin   (HbF). The results are invalid for patients with abnormal amounts of   HbF,   including those with known Hereditary Persistence   of Fetal Hemoglobin. Heterozygous hemoglobin variants (HbAS, HbAC,   HbAD, HbAE, HbA2) do not significantly interfere with this assay;   however, presence of multiple variants in a sample may impact the %   interference.          Chemistry        Component Value Date/Time     02/08/2022 0753    K 3.8 02/08/2022 0753     02/08/2022 0753    CO2 28 02/08/2022 0753    BUN 8 02/08/2022 0753    CREATININE 0.7 02/08/2022 0753     02/08/2022 0753        Component Value Date/Time    CALCIUM 10.0 02/08/2022 0753    ALKPHOS 144 (H) 11/02/2021 0826    AST 29 11/02/2021 0826    ALT 44 11/02/2021 0826    BILITOT 0.6 11/02/2021 0826    ESTGFRAFRICA >60.0 02/08/2022 0753    EGFRNONAA >60.0 02/08/2022 0753           Lab Results   Component Value Date    TSH 1.550 11/02/2021      Lab Results   Component Value Date    CHOL 113 (L) 02/08/2022    CHOL 251 (H) 11/02/2021    CHOL 193 05/19/2018     Lab Results   Component Value Date    HDL 37 (L) 02/08/2022    HDL 39 (L)  11/02/2021    HDL 42 05/19/2018     Lab Results   Component Value Date    LDLCALC 56.6 (L) 02/08/2022    LDLCALC Invalid, Trig>400.0 11/02/2021    LDLCALC 115.8 05/19/2018     Lab Results   Component Value Date    TRIG 97 02/08/2022    TRIG 426 (H) 11/02/2021    TRIG 176 (H) 05/19/2018     Lab Results   Component Value Date    CHOLHDL 32.7 02/08/2022    CHOLHDL 15.5 (L) 11/02/2021    CHOLHDL 21.8 05/19/2018         PHYSICAL EXAMINATION  Constitutional: Appears well, no distress Reviewed vitals above.  Eyes: conjunctivae & lids intact; PERRLA, EOMs intact; optic discs   Neck: Supple, trachea midline.   Respiratory: CTA without wheezes, even and unlabored  Cardiovascular: RRR;no edema or varicosities  Lymph: deferred   Skin: warm and dry; no injection site reactions  Neuro:patient alert and cooperative, normal affect; steady gait.  Psychiatric: judgement & insight intact, orientation of time, place & person intact, memory; mood & affect wnl     Diabetes Foot Exam:   Deferred     Assessment/Plan    1. Type 2 diabetes mellitus with hyperglycemia, without long-term current use of insulin  Hemoglobin A1C    blood-glucose sensor (DEXCOM G6 SENSOR) Karina    blood-glucose transmitter (DEXCOM G6 TRANSMITTER) Karina   2. Chronic left shoulder pain     3. Obesity (BMI 30-39.9)     4. Counseling and coordination of care     1-4. Follow up in 4 months w/ me   Cgm- pa needed -dexcom g6   Discussed criteria measures   Continue regimen  Losing weight  Body mass index is 32.8 kg/m².  No steroid therapy   Discussed dietary habits, stressors    FOLLOW UP  Follow up in about 4 months (around 6/10/2022).

## 2022-02-10 ENCOUNTER — HOSPITAL ENCOUNTER (OUTPATIENT)
Dept: RADIOLOGY | Facility: HOSPITAL | Age: 47
Discharge: HOME | End: 2022-02-10
Attending: NURSE PRACTITIONER
Payer: COMMERCIAL

## 2022-02-10 ENCOUNTER — OFFICE VISIT (OUTPATIENT)
Dept: INTERNAL MEDICINE | Facility: CLINIC | Age: 47
End: 2022-02-10
Payer: COMMERCIAL

## 2022-02-10 VITALS — WEIGHT: 197 LBS | HEIGHT: 65 IN | BODY MASS INDEX: 32.82 KG/M2

## 2022-02-10 VITALS
HEIGHT: 65 IN | SYSTOLIC BLOOD PRESSURE: 118 MMHG | WEIGHT: 197.06 LBS | DIASTOLIC BLOOD PRESSURE: 88 MMHG | BODY MASS INDEX: 32.83 KG/M2

## 2022-02-10 DIAGNOSIS — Z12.31 ENCOUNTER FOR SCREENING MAMMOGRAM FOR MALIGNANT NEOPLASM OF BREAST: ICD-10-CM

## 2022-02-10 DIAGNOSIS — M25.512 CHRONIC LEFT SHOULDER PAIN: ICD-10-CM

## 2022-02-10 DIAGNOSIS — E11.65 TYPE 2 DIABETES MELLITUS WITH HYPERGLYCEMIA, WITHOUT LONG-TERM CURRENT USE OF INSULIN: Primary | ICD-10-CM

## 2022-02-10 DIAGNOSIS — Z71.89 COUNSELING AND COORDINATION OF CARE: ICD-10-CM

## 2022-02-10 DIAGNOSIS — G89.29 CHRONIC LEFT SHOULDER PAIN: ICD-10-CM

## 2022-02-10 DIAGNOSIS — E66.9 OBESITY (BMI 30-39.9): ICD-10-CM

## 2022-02-10 PROCEDURE — 77063 BREAST TOMOSYNTHESIS BI: CPT | Mod: 26,,, | Performed by: RADIOLOGY

## 2022-02-10 PROCEDURE — 1159F PR MEDICATION LIST DOCUMENTED IN MEDICAL RECORD: ICD-10-PCS | Mod: CPTII,S$GLB,, | Performed by: NURSE PRACTITIONER

## 2022-02-10 PROCEDURE — 1159F MED LIST DOCD IN RCRD: CPT | Mod: CPTII,S$GLB,, | Performed by: NURSE PRACTITIONER

## 2022-02-10 PROCEDURE — 3044F PR MOST RECENT HEMOGLOBIN A1C LEVEL <7.0%: ICD-10-PCS | Mod: CPTII,S$GLB,, | Performed by: NURSE PRACTITIONER

## 2022-02-10 PROCEDURE — 99999 PR PBB SHADOW E&M-EST. PATIENT-LVL IV: CPT | Mod: PBBFAC,,, | Performed by: NURSE PRACTITIONER

## 2022-02-10 PROCEDURE — 3008F PR BODY MASS INDEX (BMI) DOCUMENTED: ICD-10-PCS | Mod: CPTII,S$GLB,, | Performed by: NURSE PRACTITIONER

## 2022-02-10 PROCEDURE — 3079F PR MOST RECENT DIASTOLIC BLOOD PRESSURE 80-89 MM HG: ICD-10-PCS | Mod: CPTII,S$GLB,, | Performed by: NURSE PRACTITIONER

## 2022-02-10 PROCEDURE — 3044F HG A1C LEVEL LT 7.0%: CPT | Mod: CPTII,S$GLB,, | Performed by: NURSE PRACTITIONER

## 2022-02-10 PROCEDURE — 3079F DIAST BP 80-89 MM HG: CPT | Mod: CPTII,S$GLB,, | Performed by: NURSE PRACTITIONER

## 2022-02-10 PROCEDURE — 99999 PR PBB SHADOW E&M-EST. PATIENT-LVL IV: ICD-10-PCS | Mod: PBBFAC,,, | Performed by: NURSE PRACTITIONER

## 2022-02-10 PROCEDURE — 77067 SCR MAMMO BI INCL CAD: CPT | Mod: TC

## 2022-02-10 PROCEDURE — 3074F SYST BP LT 130 MM HG: CPT | Mod: CPTII,S$GLB,, | Performed by: NURSE PRACTITIONER

## 2022-02-10 PROCEDURE — 3008F BODY MASS INDEX DOCD: CPT | Mod: CPTII,S$GLB,, | Performed by: NURSE PRACTITIONER

## 2022-02-10 PROCEDURE — 77063 MAMMO DIGITAL SCREENING BILAT WITH TOMO: ICD-10-PCS | Mod: 26,,, | Performed by: RADIOLOGY

## 2022-02-10 PROCEDURE — 99214 OFFICE O/P EST MOD 30 MIN: CPT | Mod: S$GLB,,, | Performed by: NURSE PRACTITIONER

## 2022-02-10 PROCEDURE — 77063 BREAST TOMOSYNTHESIS BI: CPT | Mod: TC

## 2022-02-10 PROCEDURE — 99214 PR OFFICE/OUTPT VISIT, EST, LEVL IV, 30-39 MIN: ICD-10-PCS | Mod: S$GLB,,, | Performed by: NURSE PRACTITIONER

## 2022-02-10 PROCEDURE — 1160F PR REVIEW ALL MEDS BY PRESCRIBER/CLIN PHARMACIST DOCUMENTED: ICD-10-PCS | Mod: CPTII,S$GLB,, | Performed by: NURSE PRACTITIONER

## 2022-02-10 PROCEDURE — 1160F RVW MEDS BY RX/DR IN RCRD: CPT | Mod: CPTII,S$GLB,, | Performed by: NURSE PRACTITIONER

## 2022-02-10 PROCEDURE — 77067 SCR MAMMO BI INCL CAD: CPT | Mod: 26,,, | Performed by: RADIOLOGY

## 2022-02-10 PROCEDURE — 77067 MAMMO DIGITAL SCREENING BILAT WITH TOMO: ICD-10-PCS | Mod: 26,,, | Performed by: RADIOLOGY

## 2022-02-10 PROCEDURE — 3074F PR MOST RECENT SYSTOLIC BLOOD PRESSURE < 130 MM HG: ICD-10-PCS | Mod: CPTII,S$GLB,, | Performed by: NURSE PRACTITIONER

## 2022-02-10 RX ORDER — INSULIN GLARGINE 100 [IU]/ML
INJECTION, SOLUTION SUBCUTANEOUS
Qty: 15 ML | Refills: 11
Start: 2022-02-10 | End: 2022-05-02

## 2022-02-10 RX ORDER — BLOOD-GLUCOSE SENSOR
EACH MISCELLANEOUS
Qty: 3 EACH | Refills: 11 | Status: SHIPPED | OUTPATIENT
Start: 2022-02-10 | End: 2022-05-02

## 2022-02-10 RX ORDER — BLOOD-GLUCOSE TRANSMITTER
EACH MISCELLANEOUS
Qty: 1 EACH | Refills: 3 | Status: SHIPPED | OUTPATIENT
Start: 2022-02-10 | End: 2022-05-02

## 2022-02-10 NOTE — PATIENT INSTRUCTIONS
Follow up in 4 months w/Irielle  A1c prior (1-7 days before appt)     lantus 30 units at night  Metformin 1000 mg twice a day  ozempic 0.5 mg weekly   a1c 6.6  Goal less than 7%     [email or web address]  Eat fit gina   [email or web address]  myfitnesspal gina     Goal  no higher than 180     Carb counting  grams /day

## 2022-02-11 ENCOUNTER — OFFICE VISIT (OUTPATIENT)
Dept: INTERNAL MEDICINE | Facility: CLINIC | Age: 47
End: 2022-02-11
Payer: COMMERCIAL

## 2022-02-11 ENCOUNTER — TELEPHONE (OUTPATIENT)
Dept: INTERNAL MEDICINE | Facility: CLINIC | Age: 47
End: 2022-02-11
Payer: COMMERCIAL

## 2022-02-11 VITALS
HEIGHT: 65 IN | WEIGHT: 197.56 LBS | SYSTOLIC BLOOD PRESSURE: 111 MMHG | HEART RATE: 73 BPM | DIASTOLIC BLOOD PRESSURE: 79 MMHG | BODY MASS INDEX: 32.91 KG/M2

## 2022-02-11 DIAGNOSIS — E11.65 TYPE 2 DIABETES MELLITUS WITH HYPERGLYCEMIA, WITHOUT LONG-TERM CURRENT USE OF INSULIN: Primary | ICD-10-CM

## 2022-02-11 DIAGNOSIS — E66.9 OBESITY (BMI 30-39.9): ICD-10-CM

## 2022-02-11 DIAGNOSIS — E55.9 VITAMIN D DEFICIENCY: ICD-10-CM

## 2022-02-11 DIAGNOSIS — J45.40 MODERATE PERSISTENT ASTHMA WITHOUT COMPLICATION: ICD-10-CM

## 2022-02-11 PROCEDURE — 3078F DIAST BP <80 MM HG: CPT | Mod: CPTII,S$GLB,, | Performed by: STUDENT IN AN ORGANIZED HEALTH CARE EDUCATION/TRAINING PROGRAM

## 2022-02-11 PROCEDURE — 99999 PR PBB SHADOW E&M-EST. PATIENT-LVL III: CPT | Mod: PBBFAC,,, | Performed by: STUDENT IN AN ORGANIZED HEALTH CARE EDUCATION/TRAINING PROGRAM

## 2022-02-11 PROCEDURE — 99213 PR OFFICE/OUTPT VISIT, EST, LEVL III, 20-29 MIN: ICD-10-PCS | Mod: S$GLB,,, | Performed by: STUDENT IN AN ORGANIZED HEALTH CARE EDUCATION/TRAINING PROGRAM

## 2022-02-11 PROCEDURE — 99213 OFFICE O/P EST LOW 20 MIN: CPT | Mod: S$GLB,,, | Performed by: STUDENT IN AN ORGANIZED HEALTH CARE EDUCATION/TRAINING PROGRAM

## 2022-02-11 PROCEDURE — 3008F PR BODY MASS INDEX (BMI) DOCUMENTED: ICD-10-PCS | Mod: CPTII,S$GLB,, | Performed by: STUDENT IN AN ORGANIZED HEALTH CARE EDUCATION/TRAINING PROGRAM

## 2022-02-11 PROCEDURE — 3078F PR MOST RECENT DIASTOLIC BLOOD PRESSURE < 80 MM HG: ICD-10-PCS | Mod: CPTII,S$GLB,, | Performed by: STUDENT IN AN ORGANIZED HEALTH CARE EDUCATION/TRAINING PROGRAM

## 2022-02-11 PROCEDURE — 3008F BODY MASS INDEX DOCD: CPT | Mod: CPTII,S$GLB,, | Performed by: STUDENT IN AN ORGANIZED HEALTH CARE EDUCATION/TRAINING PROGRAM

## 2022-02-11 PROCEDURE — 3074F PR MOST RECENT SYSTOLIC BLOOD PRESSURE < 130 MM HG: ICD-10-PCS | Mod: CPTII,S$GLB,, | Performed by: STUDENT IN AN ORGANIZED HEALTH CARE EDUCATION/TRAINING PROGRAM

## 2022-02-11 PROCEDURE — 99999 PR PBB SHADOW E&M-EST. PATIENT-LVL III: ICD-10-PCS | Mod: PBBFAC,,, | Performed by: STUDENT IN AN ORGANIZED HEALTH CARE EDUCATION/TRAINING PROGRAM

## 2022-02-11 PROCEDURE — 3074F SYST BP LT 130 MM HG: CPT | Mod: CPTII,S$GLB,, | Performed by: STUDENT IN AN ORGANIZED HEALTH CARE EDUCATION/TRAINING PROGRAM

## 2022-02-11 PROCEDURE — 3044F HG A1C LEVEL LT 7.0%: CPT | Mod: CPTII,S$GLB,, | Performed by: STUDENT IN AN ORGANIZED HEALTH CARE EDUCATION/TRAINING PROGRAM

## 2022-02-11 PROCEDURE — 3044F PR MOST RECENT HEMOGLOBIN A1C LEVEL <7.0%: ICD-10-PCS | Mod: CPTII,S$GLB,, | Performed by: STUDENT IN AN ORGANIZED HEALTH CARE EDUCATION/TRAINING PROGRAM

## 2022-02-11 RX ORDER — SEMAGLUTIDE 1.34 MG/ML
1 INJECTION, SOLUTION SUBCUTANEOUS
Qty: 1 PEN | Refills: 6 | Status: SHIPPED | OUTPATIENT
Start: 2022-02-14 | End: 2022-02-14

## 2022-02-11 NOTE — TELEPHONE ENCOUNTER
----- Message from ESPERANZA Roberts, FNP sent at 2/10/2022  9:28 AM CST -----  Regarding: dexcom transmitter, sensors  Hi!  Pt needs pa for dexcom g6 sensors, transmitter  E 11.65   Thanks  Brittany   On insulin, on ozempic , on metformin

## 2022-02-11 NOTE — ASSESSMENT & PLAN NOTE
Chief Pain Complaint:  Low Back Pain, Bilat Hip pain, Right leg pain, right knee pain      Interval History: Patient was seen on 7/25/18. At that time he underwent right knee genicular nerve block.  The patient reports that he is/was better following the procedure.  he reports 70% pain relief.  The changes lasted ~6 weeks so far.  The changes have continued through this visit.      History of Present Illness:   This patient is a 84 y.o. male who presents today complaining of the above noted pain/s. The patient describes the pain as follows.    - duration of pain: chronic pain   - timing: constant   - character: aching, sharp  - radiating, dermatomal: extends into right leg    - antecedent trauma, prior spinal surgery: patient reports prior trauma, prior lumbar surgery (surgery in January 2017 with [place]. Lancaster at [facility] [place] left knee replacement  - pertinent negatives: No fever, No chills, No weight loss, No bladder dysfunction, No bowel dysfunction, No saddle anesthesia  - pertinent positives: generalized nonspecific Lower Extremity weakness bilaterally, BLE numbness  - medications, other therapies tried (physical therapy, injections):     >> Tylenol, NSAIDs, gabapentin, Mobic, tramadol, Norco    >> Has previously undergone Physical Therapy    >> Has previously undergone spinal injection/s    - has undergone approximately 3-4 spinal injections previously (uncertain as to the specific type of injections that he has had, seems one was a lumbar rhizotomy)   - right knee genicular nerve block on 7/25/18 with 70% pain relief        Imaging / Labs / Studies (reviewed on 9/7/2018):    7/12/2018 XR KNEE ORTHO BILAT WITH FLEXION  TECHNIQUE:  AP standing of both knees, PA flexion standing views of both knees, and Merchant views of both knees were performed.  Lateral views of both knees were also performed.  COMPARISON:  None  FINDINGS:  There are postoperative changes of left total knee arthroplasty.  Prosthesis position and  Lost approx 7lbs since starting Ozempic, increasing dose   Working on lifestyle changes   alignment are satisfactory without radiographic evidence of hardware loosening or other complicating process.  Moderate degenerative change of the right knee.  No acute fracture or malalignment.       Results for orders placed during the hospital encounter of 11/16/15   X-Ray Lumbar Spine Complete 5 View    Narrative Five views of the lumbar spine  Findings: There is increased vertebral body height loss noted at the L1 level.  50% vertebrae height loss is noted anteriorly at this level.  The alignment is within normal limits. There is moderate disk space narrowing noted at the L4-5 level.  Mild/moderate facet arthropathy is noted from L2-3 through L5-S1 levels. Calcified granulomas are noted within the spleen. No pars defects.       Results for orders placed during the hospital encounter of 03/26/15   X-Ray Lumbar Spine AP And Lateral    Narrative Three views of the lumbar spine  Findings: There is a compression L1 level that is new when compared to the prior exam and demonstrates approximately 25% vertebral height loss.  There is no obvious condensation line seen on the plain films suggesting that this is still a chronic deformity.  The alignment is grossly within normal limits.  There is multilevel spondylosis with mild to moderate to space narrowing noted throughout the lumbar spine greatest at L4-5 level.  Facet arthropathy is noted from the L2-3 through the L5-S1 levels and most prominent at the L5-S1 level.         Review of Systems:  CONSTITUTIONAL: patient denies any fever, chills, or weight loss  SKIN: patient denies any rash or itching  RESPIRATORY: patient denies having any shortness of breath  GASTROINTESTINAL: patient denies having any diarrhea, constipation, or bowel incontinence  GENITOURINARY: patient denies having any abnormal bladder function    MUSCULOSKELETAL:  - patient complains of the above noted pain/s (see chief pain complaint)    NEUROLOGICAL:   - pain as above  - strength in Lower  "extremities is decreased, BILATERALLY  - sensation in Lower extremities is abnormal, on the LEFT  - patient denies any loss of bowel or bladder control      PSYCHIATRIC: patient denies any change in mood    Other:  All other systems reviewed and are negative      Physical Exam:  Vitals:  /73 (BP Location: Right arm, Patient Position: Sitting, BP Method: Medium (Automatic))   Pulse 72   Resp 18   Ht 5' 10" (1.778 m)   Wt 70.3 kg (155 lb)   BMI 22.24 kg/m²    (reviewed on 9/7/2018)    General: alert and oriented, in no apparent distress  Gait: normal gait  Skin: No rashes, No discoloration, No obvious lesions  HEENT: EOMI  Cardiovascular: no significant peripheral edema present  Respiratory: respirations nonlabored    Musculoskeletal:  - Any pain on flexion, extension, rotation:    >> pain on extension and rotation of lumbar spine    >> facet loading causes mild pain bilaterally, R>L   - Straight Leg Raise:     >> LEFT :: negative    >> RIGHT :: positive  - Any tenderness to palpation across paraspinal muscles, joints, bursae:     >> across lumbar paraspinals    Neuro:  - Extremity Strength:     >> LEFT :: dec with dorsiflexion    >> RIGHT :: dec with dorsiflexion  - Extremity Reflexes:    >> LEFT  :: 2+    >> RIGHT :: 2+     Psych:  Mood and affect is appropriate    _________________________________________________________________________________________________________________________________________________________________________________________________________________________      Assessment:  Dominguez Ritchie is a 84 y.o. male who presents with    ICD-10-CM ICD-9-CM   1. Primary osteoarthritis of left knee M17.12 715.16   2. Spondylosis of lumbosacral region without myelopathy or radiculopathy M47.817 721.3   3. Bilateral lumbar radiculopathy M54.16 724.4   4. DDD (degenerative disc disease), lumbar M51.36 722.52   5. Failed back syndrome of lumbar spine M96.1 722.83      Patient returns for follow-up " visit.  He complains of continued low back and right leg pain.  He has been seen at the [facility], underwent a spinal injection that did not help, and then ultimately underwent surgery (unsure of type) with Dr. Lancaster in January 2017.  We previously requested records multiple times from the [facility], however we have not received these. He also has issues with dizziness, which he reports was from a benign brain tumor he had years ago. He had treatment for this, but he reports the damage was already present by the time it was discovered.       Plan:  1. Interventional:   - S/p right knee genicular nerve block on 7/25/18 with 70% pain relief.    - Consider bilateral lumbar MBB with local for persistent pain.    2. Pharmacologic:   - Refill tramadol 50mg TID PRN pain. He takes very sparingly. Will send in electronically.   - Will refill Mobic at 7.5mg QD PRN dose. He denies any kidney or cardiac issues and has no history of gastric ulcers. Most recent labs reviewed.  - Opioid contract to be signed next visit with Dr. Sadler.   - [place]  reviewed and appropriate.      3. Rehabilitative: Encouraged regular exercise.    4. Diagnostic: Consider ordering lumbar MRI to evaluate radiculopathy.     5. Follow up: PRN    - I discussed the risks, benefits, and alternatives to potential treatment options. All questions and concerns were fully addressed today in clinic. Dr. Sadler was consulted regarding the patient plan and agrees.

## 2022-02-11 NOTE — TELEPHONE ENCOUNTER
Spoke with [email or web address] staff to perform prior authorization for Dexcom G6 sensors and transmitters, staff stated prior authorization request was done on yesterday and denial letter sent to the office  Staff to refax denial letter at this time

## 2022-02-11 NOTE — PROGRESS NOTES
INTERNAL MEDICINE CLINIC VISIT    Subjective     Chief Complaint:   Chief Complaint   Patient presents with    Diabetes       History of Present Illness:  Ms. Tete Liu is a 46 y.o. female with asthma, obesity, seasonal allergies, vit D def, and newly diagnosed T2DM who presents for DM f/u. Established care with me as PCP on 11/1/2021. At that time diagnosed with DM with a1c>14. She was started on metformin, ozempic, and lantus. We have been able to slowly decrease her Lantus dose. She saw Brittany Donald this week and they reduced Lantus from 15u to 12u qhs. A1c checked this week 6.6%, down from >14% in 11/2021. She is doing well on medication regimen. Has noticed early satiety and some weight loss since starting Ozempic, but denies n/v.     Optometry appt scheduled in April. Had MMG done yesterday (read pending) and c-scope up to date as well. We discussing pneumococcal vaccination which she declines at this time.     Review of Systems   Constitutional: Positive for weight loss. Negative for chills and fever.   HENT: Negative for congestion and sore throat.    Eyes: Negative for blurred vision, double vision and pain.   Respiratory: Negative for cough, shortness of breath and wheezing.    Cardiovascular: Negative for chest pain, palpitations and leg swelling.   Gastrointestinal: Negative for abdominal pain, constipation, diarrhea, heartburn, nausea and vomiting.   Genitourinary: Negative for dysuria and hematuria.   Musculoskeletal: Negative for falls and myalgias.   Skin: Negative for rash.   Neurological: Negative for dizziness, tingling, weakness and headaches.   Endo/Heme/Allergies: Positive for environmental allergies. Negative for polydipsia.   Psychiatric/Behavioral: Negative for depression. The patient is not nervous/anxious and does not have insomnia.        PAST HISTORY:     Past Medical History:   Diagnosis Date    Allergy     Asthma     adult onset    Type 2 diabetes mellitus     diagnosed 11/2021        Past Surgical History:   Procedure Laterality Date     SECTION, LOW TRANSVERSE      COLONOSCOPY N/A 10/6/2020    Procedure: COLONOSCOPY;  Surgeon: Juanita Antonio MD;  Location: [facility];  Service: Endoscopy;  Laterality: N/A;       Family History   Problem Relation Age of Onset    Hypertension Mother     Breast cancer Mother 50    Diabetes Father     Stroke Father     Cancer Father     Depression Brother     Early death Brother     Mental illness Brother     Breast cancer Maternal Aunt 50        50s-60s    Breast cancer Paternal Aunt 50        50s-60s       Social History     Socioeconomic History    Marital status:    Occupational History    Occupation: [employer]      Employer: [employer]   Tobacco Use    Smoking status: Former Smoker     Types: Cigarettes    Smokeless tobacco: Never Used    Tobacco comment: Quit in    Substance and Sexual Activity    Alcohol use: Yes     Comment: rarely    Drug use: No    Sexual activity: Yes     Partners: Male   Social History Narrative    Lives w/ and kids       MEDICATIONS & ALLERGIES:     Current Outpatient Medications on File Prior to Visit   Medication Sig    albuterol (PROVENTIL/VENTOLIN HFA) 90 mcg/actuation inhaler TAKE 2 PUFFS BY MOUTH EVERY 4 HOURS AS NEEDED FOR WHEEZING    atorvastatin (LIPITOR) 40 MG tablet Take 1 tablet (40 mg total) by mouth once daily.    blood sugar diagnostic Strp To check BG 4 times daily, to use with insurance preferred meter    blood-glucose meter kit To check BG 4 times daily, to use with insurance preferred meter    blood-glucose sensor (DEXCOM G6 SENSOR) Karina Change every 10 days, e 11.65    blood-glucose transmitter (DEXCOM G6 TRANSMITTER) Karina Change every 3 months e 11.65    cetirizine (ZYRTEC) 10 MG tablet Take 10 mg by mouth once daily.    fluticasone-salmeterol diskus inhaler 100-50 mcg Inhale 1 puff into the lungs 2 (two) times daily. Controller     "insulin (LANTUS SOLOSTAR U-100 INSULIN) glargine 100 units/mL (3mL) SubQ pen Inject 15-18 units at night.    lancets Misc To check BG 4 times daily, to use with insurance preferred meter    metFORMIN (GLUCOPHAGE-XR) 500 MG ER 24hr tablet Take 2 tablets (1,000 mg total) by mouth 2 (two) times daily with meals. Start with 500 mg once daily with dinner for 1 week, then increase to 500 mg twice daily with meals. If tolerating this dose can increase to 1000 mg by mouth twice daily with meals    pen needle, diabetic 32 gauge x 5/32" Ndle 1 each by Misc.(Non-Drug; Combo Route) route 4 (four) times daily with meals and nightly.    semaglutide (OZEMPIC) 0.25 mg or 0.5 mg(2 mg/1.5 mL) pen injector Inject 0.25 mg into the skin every 7 days. 0.25 mg once weekly for 4 weeks, then increase to 0.5 mg once weekly; may increase to 1 mg once weekly after an additional 4 weeks if needed to achieve glycemic goals.     No current facility-administered medications on file prior to visit.       Review of patient's allergies indicates:  No Known Allergies    OBJECTIVE:     Vital Signs:  Vitals:    02/11/22 0811   BP: 111/79   Pulse: 73   Weight: 89.6 kg (197 lb 8.5 oz)   Height: 5' 4.5" (1.638 m)       Body mass index is 33.38 kg/m².     Physical Exam  Constitutional:       General: She is not in acute distress.     Appearance: Normal appearance. She is not ill-appearing.   HENT:      Head: Normocephalic and atraumatic.      Mouth/Throat:      Mouth: Mucous membranes are moist.   Eyes:      General:         Right eye: No discharge.         Left eye: No discharge.      Conjunctiva/sclera: Conjunctivae normal.   Cardiovascular:      Rate and Rhythm: Normal rate and regular rhythm.   Pulmonary:      Effort: Pulmonary effort is normal. No respiratory distress.      Breath sounds: Normal breath sounds. No wheezing or rales.   Abdominal:      General: There is no distension.      Palpations: Abdomen is soft.      Tenderness: There is no " abdominal tenderness.      Comments: Insulin inj sites without erythema or irritation   Musculoskeletal:      Cervical back: Normal range of motion and neck supple.      Right lower leg: No edema.      Left lower leg: No edema.   Skin:     General: Skin is warm and dry.   Neurological:      Mental Status: She is alert and oriented to person, place, and time.   Psychiatric:         Mood and Affect: Mood normal.         Behavior: Behavior normal.         Laboratory  Lab Results   Component Value Date    WBC 6.60 11/02/2021    HGB 16.3 (H) 11/02/2021    HCT 48.3 11/02/2021    MCV 85 11/02/2021     11/02/2021     BMP  Lab Results   Component Value Date     02/08/2022    K 3.8 02/08/2022     02/08/2022    CO2 28 02/08/2022    BUN 8 02/08/2022    CREATININE 0.7 02/08/2022    CALCIUM 10.0 02/08/2022    ANIONGAP 9 02/08/2022    ESTGFRAFRICA >60.0 02/08/2022    EGFRNONAA >60.0 02/08/2022     No results found for: INR, PROTIME  Lab Results   Component Value Date    HGBA1C 6.6 (H) 02/08/2022       Health Maintenance Due   Topic Date Due    Pneumococcal Vaccines (Age 0-64) (1 of 2 - PPSV23) Never done    Eye Exam  Never done    Mammogram  12/21/2021         ASSESSMENT & PLAN:   Ms. Tete Liu is a 46 y.o. female with asthma, obesity, seasonal allergies, vit D def, and newly diagnosed T2DM who presents for DM f/u.    Type 2 diabetes mellitus with hyperglycemia, without long-term current use of insulin  A1c now 6.6% after 3mo on basal insulin/metformin/GLP  Continue metformin  Increase Ozempic to 1mg q7d   Begin Jardiance 10mg qd - pt prefers to increase Ozempic first then if tolerating begin Jardiance   Reviewed SE and risks/benefits of SGLT2i. No c/i.  Lantus was decreased this wk from 15u qhs --> 12u qhs  Keep close eye on BG, likely will need to decrease further once increasing GLP and starting SGLT2i  Eye dr appt scheduled for 4/2022, other screening up to date  F/u in 3-4mo    Obesity (BMI  30-39.9)  Lost approx 7lbs since starting Ozempic, increasing dose   Working on lifestyle changes    Vitamin D deficiency  Rechecked recently and now >30  Continue maintenance 1000 IU qd    Moderate persistent asthma without complication  Continue Advair, Albuterol prn, and zyrtec        RTC in 3-4mo with a1c before     Discussed with Dr. Wild  - attestation to follow      Emani Tanner MD  Internal Medicine, PGY-III  [facility]  [address] [place] [id number] [phone]

## 2022-02-11 NOTE — ASSESSMENT & PLAN NOTE
A1c now 6.6% after 3mo on basal insulin/metformin/GLP  Continue metformin  Increase Ozempic to 1mg q7d   Begin Jardiance 10mg qd - pt prefers to increase Ozempic first then if tolerating begin Jardiance   Reviewed SE and risks/benefits of SGLT2i. No c/i.  Lantus was decreased this wk from 15u qhs --> 12u qhs  Keep close eye on BG, likely will need to decrease further once increasing GLP and starting SGLT2i  Eye dr nichols scheduled for 4/2022, other screening up to date  F/u in 3-4mo

## 2022-02-11 NOTE — PATIENT INSTRUCTIONS
- Increase Ozempic to 1mg weekly  - After 4 weeks if still tolerating it well, begin Jardiance - 10mg by mouth once daily. We can increase the Jardiance further if needed in the future  - Goal is to get you off insulin

## 2022-02-16 RX ORDER — SEMAGLUTIDE 1.34 MG/ML
1 INJECTION, SOLUTION SUBCUTANEOUS
Qty: 1 PEN | Refills: 11 | Status: SHIPPED | OUTPATIENT
Start: 2022-02-16 | End: 2022-02-21 | Stop reason: SDUPTHER

## 2022-02-21 ENCOUNTER — PATIENT MESSAGE (OUTPATIENT)
Dept: INTERNAL MEDICINE | Facility: CLINIC | Age: 47
End: 2022-02-21
Payer: COMMERCIAL

## 2022-02-21 RX ORDER — SEMAGLUTIDE 1.34 MG/ML
1 INJECTION, SOLUTION SUBCUTANEOUS
Qty: 1 PEN | Refills: 11 | Status: SHIPPED | OUTPATIENT
Start: 2022-02-21 | End: 2022-06-30 | Stop reason: SDUPTHER

## 2022-03-07 PROBLEM — Z00.00 HEALTHCARE MAINTENANCE: Status: RESOLVED | Noted: 2021-12-06 | Resolved: 2022-03-07

## 2022-03-10 ENCOUNTER — TELEPHONE (OUTPATIENT)
Dept: INTERNAL MEDICINE | Facility: CLINIC | Age: 47
End: 2022-03-10
Payer: COMMERCIAL

## 2022-03-10 NOTE — TELEPHONE ENCOUNTER
----- Message from Mariam Agee sent at 3/10/2022  2:24 PM CST -----  Contact: Ms. Leblanca Pharmacist  @ [facility]  Ms. York would like a call back in regards to her wanting to get clarification on the strength that you would like for her to fill for the patients semaglutide (OZEMPIC) 1 mg/dose (4 mg/3 mL) please.       [facility] Phone# [phone], fax# [phone]

## 2022-03-18 ENCOUNTER — PATIENT MESSAGE (OUTPATIENT)
Dept: INTERNAL MEDICINE | Facility: CLINIC | Age: 47
End: 2022-03-18
Payer: COMMERCIAL

## 2022-03-21 ENCOUNTER — PATIENT MESSAGE (OUTPATIENT)
Dept: INTERNAL MEDICINE | Facility: CLINIC | Age: 47
End: 2022-03-21
Payer: COMMERCIAL

## 2022-03-22 DIAGNOSIS — E11.65 TYPE 2 DIABETES MELLITUS WITH HYPERGLYCEMIA, WITHOUT LONG-TERM CURRENT USE OF INSULIN: ICD-10-CM

## 2022-03-22 RX ORDER — METFORMIN HYDROCHLORIDE 500 MG/1
1000 TABLET, EXTENDED RELEASE ORAL 2 TIMES DAILY WITH MEALS
Qty: 120 TABLET | Refills: 11 | Status: SHIPPED | OUTPATIENT
Start: 2022-03-22 | End: 2022-11-20 | Stop reason: SDUPTHER

## 2022-04-27 ENCOUNTER — LAB VISIT (OUTPATIENT)
Dept: LAB | Facility: HOSPITAL | Age: 47
End: 2022-04-27
Payer: COMMERCIAL

## 2022-04-27 DIAGNOSIS — E11.65 TYPE 2 DIABETES MELLITUS WITH HYPERGLYCEMIA, WITHOUT LONG-TERM CURRENT USE OF INSULIN: ICD-10-CM

## 2022-04-27 LAB
ESTIMATED AVG GLUCOSE: 108 MG/DL (ref 68–131)
HBA1C MFR BLD: 5.4 % (ref 4–5.6)

## 2022-04-27 PROCEDURE — 36415 COLL VENOUS BLD VENIPUNCTURE: CPT | Performed by: STUDENT IN AN ORGANIZED HEALTH CARE EDUCATION/TRAINING PROGRAM

## 2022-04-27 PROCEDURE — 83036 HEMOGLOBIN GLYCOSYLATED A1C: CPT | Performed by: STUDENT IN AN ORGANIZED HEALTH CARE EDUCATION/TRAINING PROGRAM

## 2022-04-28 ENCOUNTER — PATIENT MESSAGE (OUTPATIENT)
Dept: INTERNAL MEDICINE | Facility: CLINIC | Age: 47
End: 2022-04-28
Payer: COMMERCIAL

## 2022-05-02 ENCOUNTER — OFFICE VISIT (OUTPATIENT)
Dept: INTERNAL MEDICINE | Facility: CLINIC | Age: 47
End: 2022-05-02
Payer: COMMERCIAL

## 2022-05-02 VITALS
WEIGHT: 185.88 LBS | HEIGHT: 65 IN | SYSTOLIC BLOOD PRESSURE: 102 MMHG | DIASTOLIC BLOOD PRESSURE: 70 MMHG | HEART RATE: 69 BPM | BODY MASS INDEX: 30.97 KG/M2 | OXYGEN SATURATION: 96 %

## 2022-05-02 DIAGNOSIS — E11.65 TYPE 2 DIABETES MELLITUS WITH HYPERGLYCEMIA, WITHOUT LONG-TERM CURRENT USE OF INSULIN: Primary | ICD-10-CM

## 2022-05-02 DIAGNOSIS — M25.511 CHRONIC PAIN OF BOTH SHOULDERS: ICD-10-CM

## 2022-05-02 DIAGNOSIS — E78.5 HYPERLIPIDEMIA, UNSPECIFIED HYPERLIPIDEMIA TYPE: ICD-10-CM

## 2022-05-02 DIAGNOSIS — G89.29 CHRONIC PAIN OF BOTH SHOULDERS: ICD-10-CM

## 2022-05-02 DIAGNOSIS — E66.9 OBESITY (BMI 30-39.9): ICD-10-CM

## 2022-05-02 DIAGNOSIS — M25.512 CHRONIC PAIN OF BOTH SHOULDERS: ICD-10-CM

## 2022-05-02 PROCEDURE — 99999 PR PBB SHADOW E&M-EST. PATIENT-LVL V: ICD-10-PCS | Mod: PBBFAC,,, | Performed by: STUDENT IN AN ORGANIZED HEALTH CARE EDUCATION/TRAINING PROGRAM

## 2022-05-02 PROCEDURE — 99213 PR OFFICE/OUTPT VISIT, EST, LEVL III, 20-29 MIN: ICD-10-PCS | Mod: S$GLB,,, | Performed by: STUDENT IN AN ORGANIZED HEALTH CARE EDUCATION/TRAINING PROGRAM

## 2022-05-02 PROCEDURE — 99213 OFFICE O/P EST LOW 20 MIN: CPT | Mod: S$GLB,,, | Performed by: STUDENT IN AN ORGANIZED HEALTH CARE EDUCATION/TRAINING PROGRAM

## 2022-05-02 PROCEDURE — 99999 PR PBB SHADOW E&M-EST. PATIENT-LVL V: CPT | Mod: PBBFAC,,, | Performed by: STUDENT IN AN ORGANIZED HEALTH CARE EDUCATION/TRAINING PROGRAM

## 2022-05-02 RX ORDER — LANCETS 30 GAUGE
EACH MISCELLANEOUS
COMMUNITY
Start: 2022-01-30

## 2022-05-02 NOTE — PATIENT INSTRUCTIONS
Continue Jardiance, Metformin, and Ozempic  Hold Jardiance any time you ar sick, resume when feeling better  I am [facility] with [facility] July [facility]. You can call them after July 1 to schedule follow up with me in November 2022  Ophthalmology referral  Referral to PM&R for shoulder - acupuncture or dry needling

## 2022-05-02 NOTE — ASSESSMENT & PLAN NOTE
Reviewed shoulder exercises and printed handouts for pt  Referral to PM&R as pt is interested in options like acupuncture or dry needling

## 2022-05-02 NOTE — PROGRESS NOTES
"INTERNAL MEDICINE CLINIC VISIT    Subjective     Chief Complaint:   Chief Complaint   Patient presents with    Follow-up       History of Present Illness:  Ms. Tete Liu is a 46 y.o. female with type 2 diabetes, HLD, asthma, and chronic shoulder pain (b/l) presents for follow up.    Newly diagnosed T2DM in 11/2021 with a1c>14 at that time. Started on Metformin and Lantus. Frequent f/u with me and with [facility] for DM education and medication optimization. With better glycemic control Lantus was weaned down. Started on Ozempic in 12/2021 and then low dose Jardiance in 2/2022. Weaned off insulin completely by the end of March 2022 with a1c trend >14% (11/10/2021) --> 6.6% (2/8/2022) --> 5.4% (4/27/2022). Now checking only fasting  BG, runs 90s-110 usually. Highest 120s. Tolerating meds well.     Intermittent fasting, between 8p-12p and now has gym membership. She goes to the gym 4-5x/wk with two teenaged sons. Since November 2021 she has lost 20lbs (205lbs-->185lbs). States she is feeling better than she has in a long time. She has not changed her diet much but now feels like she can focus on that more specifically. Has sweets cravings.     For shoulder pain, she was referred to PT but cost was >$200 so she did not schedule an appointment and instead looked up shoulder exercises online and is also doing yoga. Pain overall improved, but today she feels like she "slept on it wrong". She still has stiffness.     Review of Systems   Constitutional: Positive for weight loss. Negative for chills and fever.   HENT: Negative for congestion and sore throat.    Eyes: Negative for blurred vision, double vision and pain.   Respiratory: Negative for cough, shortness of breath and wheezing.    Cardiovascular: Negative for chest pain, palpitations and leg swelling.   Gastrointestinal: Negative for abdominal pain, constipation, diarrhea, heartburn, nausea and vomiting.   Genitourinary: Negative for dysuria and hematuria. "   Musculoskeletal: Positive for joint pain (shoulders). Negative for falls and myalgias.   Skin: Negative for rash.   Neurological: Negative for dizziness, tingling, weakness and headaches.   Endo/Heme/Allergies: Positive for environmental allergies. Negative for polydipsia.   Psychiatric/Behavioral: Negative for depression. The patient is not nervous/anxious and does not have insomnia.        PAST HISTORY:     Past Medical History:   Diagnosis Date    Allergy     Asthma     adult onset    Type 2 diabetes mellitus     diagnosed 2021       Past Surgical History:   Procedure Laterality Date     SECTION, LOW TRANSVERSE      COLONOSCOPY N/A 10/6/2020    Procedure: COLONOSCOPY;  Surgeon: Juanita Antonio MD;  Location: [facility];  Service: Endoscopy;  Laterality: N/A;       Family History   Problem Relation Age of Onset    Hypertension Mother     Breast cancer Mother 50    Diabetes Father     Stroke Father     Cancer Father     Depression Brother     Early death Brother     Mental illness Brother     Breast cancer Maternal Aunt 50        50s-60s    Breast cancer Paternal Aunt 50        50s-60s       Social History     Socioeconomic History    Marital status:    Occupational History    Occupation: [employer]      Employer: [employer]   Tobacco Use    Smoking status: Former Smoker     Types: Cigarettes    Smokeless tobacco: Never Used    Tobacco comment: Quit in    Substance and Sexual Activity    Alcohol use: Yes     Comment: rarely    Drug use: No    Sexual activity: Yes     Partners: Male   Social History Narrative    Lives w/ and kids       MEDICATIONS & ALLERGIES:     Current Outpatient Medications on File Prior to Visit   Medication Sig    albuterol (PROVENTIL/VENTOLIN HFA) 90 mcg/actuation inhaler TAKE 2 PUFFS BY MOUTH EVERY 4 HOURS AS NEEDED FOR WHEEZING    atorvastatin (LIPITOR) 40 MG tablet Take 1 tablet (40 mg total) by mouth once daily.     "blood sugar diagnostic Strp To check BG 4 times daily, to use with insurance preferred meter    blood-glucose meter kit To check BG 4 times daily, to use with insurance preferred meter    blood-glucose sensor (DEXCOM G6 SENSOR) Karina Change every 10 days, e 11.65    blood-glucose transmitter (DEXCOM G6 TRANSMITTER) Karina Change every 3 months e 11.65    cetirizine (ZYRTEC) 10 MG tablet Take 10 mg by mouth once daily.    empagliflozin (JARDIANCE) 10 mg tablet Take 1 tablet (10 mg total) by mouth once daily.    fluticasone-salmeterol diskus inhaler 100-50 mcg Inhale 1 puff into the lungs 2 (two) times daily. Controller    insulin (LANTUS SOLOSTAR U-100 INSULIN) glargine 100 units/mL (3mL) SubQ pen Inject 15-18 units at night.    lancets Misc To check BG 4 times daily, to use with insurance preferred meter    metFORMIN (GLUCOPHAGE-XR) 500 MG ER 24hr tablet Take 2 tablets (1,000 mg total) by mouth 2 (two) times daily with meals. Start with 500 mg once daily with dinner for 1 week, then increase to 500 mg twice daily with meals. If tolerating this dose can increase to 1000 mg by mouth twice daily with meals    pen needle, diabetic 32 gauge x 5/32" Ndle 1 each by Misc.(Non-Drug; Combo Route) route 4 (four) times daily with meals and nightly.    semaglutide (OZEMPIC) 1 mg/dose (4 mg/3 mL) Inject 1 mg into the skin every 7 days.     No current facility-administered medications on file prior to visit.       Review of patient's allergies indicates:  No Known Allergies    OBJECTIVE:     Vital Signs:  Vitals:    05/02/22 0814   BP: 102/70   BP Location: Left arm   Patient Position: Sitting   BP Method: Large (Manual)   Pulse: 69   SpO2: 96%   Weight: 84.3 kg (185 lb 13.6 oz)   Height: 5' 5" (1.651 m)       Body mass index is 30.93 kg/m².     Physical Exam  Constitutional:       General: She is not in acute distress.     Appearance: Normal appearance. She is not ill-appearing.   HENT:      Head: Normocephalic and " atraumatic.      Mouth/Throat:      Mouth: Mucous membranes are moist.   Eyes:      General:         Right eye: No discharge.         Left eye: No discharge.      Conjunctiva/sclera: Conjunctivae normal.   Cardiovascular:      Rate and Rhythm: Normal rate and regular rhythm.   Pulmonary:      Effort: Pulmonary effort is normal. No respiratory distress.      Breath sounds: Normal breath sounds. No wheezing or rales.   Abdominal:      General: There is no distension.      Palpations: Abdomen is soft.      Comments: Insulin inj sites without erythema or irritation   Musculoskeletal:      Cervical back: Normal range of motion and neck supple.      Right lower leg: No edema.      Left lower leg: No edema.   Skin:     General: Skin is warm and dry.   Neurological:      Mental Status: She is alert and oriented to person, place, and time. Mental status is at baseline.   Psychiatric:         Mood and Affect: Mood normal.         Behavior: Behavior normal.         Laboratory  Lab Results   Component Value Date    WBC 6.60 11/02/2021    HGB 16.3 (H) 11/02/2021    HCT 48.3 11/02/2021    MCV 85 11/02/2021     11/02/2021     BMP  Lab Results   Component Value Date     02/08/2022    K 3.8 02/08/2022     02/08/2022    CO2 28 02/08/2022    BUN 8 02/08/2022    CREATININE 0.7 02/08/2022    CALCIUM 10.0 02/08/2022    ANIONGAP 9 02/08/2022    ESTGFRAFRICA >60.0 02/08/2022    EGFRNONAA >60.0 02/08/2022     No results found for: INR, PROTIME  Lab Results   Component Value Date    HGBA1C 5.4 04/27/2022         Health Maintenance Due   Topic Date Due    Eye Exam  Never done         ASSESSMENT & PLAN:   Ms. Tete Liu is a 46 y.o. female  with type 2 diabetes, HLD, asthma, and chronic shoulder pain (b/l) presents for follow up.    Hyperlipidemia  Started on Lipitor 11/2021 after DM dx. Initial lipid panel with TGs>400 and TChol 250s  Significant improvement in 2/2022 with LDL 50s and TG 97. HDL slightly low at  37.  Tolerating statin    - Continue Lipitor  - Repeat labs in 6mo-1yr      Obesity (BMI 30-39.9)  Lost 20lbs in 6mo (205lbs in 11/2021 and 185lbs 5/2022)  Now going to the gym 4-5x/wk with her sons  Feeling great  We discussed some ways to combat sweets cravings. She feels she can now better focus on improving her diet    Type 2 diabetes mellitus with hyperglycemia, without long-term current use of insulin  Significant improvement in a1c in 6mo, weaned off insulin bu the end of March 2022  Tolerating medications, recently increased Ozempic up to 1mg weekly  Checking fasting BG only, and she checks if does not feel well    - Continue Metformin, Jardiance, and Ozempic  - Referral to ophthalmology for DM eye exam  - She is otherwise up to date on screenings   - Repeat a1c in 6mo    Chronic left shoulder pain  Reviewed shoulder exercises and printed handouts for pt  Referral to PM&R as pt is interested in options like acupuncture or dry needling       RTC in 6mo    Discussed with Dr. Porter  - attestation to follow      Emani Tanner MD  Internal Medicine, PGY-III  [facility]  [address] [place], [place] [id number] [phone]   [Well Nourished] : well nourished [Well Developed] : well developed [Well-Appearing] : well-appearing [Normal Sclera/Conjunctiva] : normal sclera/conjunctiva [PERRL] : pupils equal round and reactive to light [EOMI] : extraocular movements intact [Normal Outer Ear/Nose] : the outer ears and nose were normal in appearance [Normal Oropharynx] : the oropharynx was normal [No JVD] : no jugular venous distention [No Lymphadenopathy] : no lymphadenopathy [Supple] : supple [Thyroid Normal, No Nodules] : the thyroid was normal and there were no nodules present [No Respiratory Distress] : no respiratory distress  [No Accessory Muscle Use] : no accessory muscle use [Clear to Auscultation] : lungs were clear to auscultation bilaterally [Normal Rate] : normal rate  [Regular Rhythm] : with a regular rhythm [Normal S1, S2] : normal S1 and S2 [I] : a grade 1 [No Carotid Bruits] : no carotid bruits [No Abdominal Bruit] : a ~M bruit was not heard ~T in the abdomen [No Varicosities] : no varicosities [Pedal Pulses Present] : the pedal pulses are present [No Edema] : there was no peripheral edema [No Palpable Aorta] : no palpable aorta [No Extremity Clubbing/Cyanosis] : no extremity clubbing/cyanosis [Declined Breast Exam] : declined breast exam  [Soft] : abdomen soft [Non Tender] : non-tender [Non-distended] : non-distended [No Masses] : no abdominal mass palpated [No HSM] : no HSM [Normal Bowel Sounds] : normal bowel sounds [Normal Posterior Cervical Nodes] : no posterior cervical lymphadenopathy [Normal Anterior Cervical Nodes] : no anterior cervical lymphadenopathy [No CVA Tenderness] : no CVA  tenderness [No Spinal Tenderness] : no spinal tenderness [No Joint Swelling] : no joint swelling [Grossly Normal Strength/Tone] : grossly normal strength/tone [No Rash] : no rash [Coordination Grossly Intact] : coordination grossly intact [No Focal Deficits] : no focal deficits [Normal Gait] : normal gait [Deep Tendon Reflexes (DTR)] : deep tendon reflexes were 2+ and symmetric [Normal Affect] : the affect was normal [Normal Insight/Judgement] : insight and judgment were intact

## 2022-05-02 NOTE — ASSESSMENT & PLAN NOTE
Started on Lipitor 11/2021 after DM dx. Initial lipid panel with TGs>400 and TChol 250s  Significant improvement in 2/2022 with LDL 50s and TG 97. HDL slightly low at 37.  Tolerating statin    - Continue Lipitor  - Repeat labs in 6mo-1yr

## 2022-05-02 NOTE — ASSESSMENT & PLAN NOTE
Significant improvement in a1c in 6mo, weaned off insulin bu the end of March 2022  Tolerating medications, recently increased Ozempic up to 1mg weekly  Checking fasting BG only, and she checks if does not feel well    - Continue Metformin, Jardiance, and Ozempic  - Referral to ophthalmology for DM eye exam  - She is otherwise up to date on screenings   - Repeat a1c in 6mo

## 2022-05-06 NOTE — PROGRESS NOTES
"I have reviewed the notes, assessments, and/or procedures performed by Dr. Tanner, I concur with their documentation of Tete Liu.     /70 (BP Location: Left arm, Patient Position: Sitting, BP Method: Large (Manual))   Pulse 69   Ht 5' 5" (1.651 m)   Wt 84.3 kg (185 lb 13.6 oz)   LMP 06/20/2021 (Approximate)   SpO2 96%   BMI 30.93 kg/m²      Jamil Porter DO   "

## 2022-06-07 ENCOUNTER — IMMUNIZATION (OUTPATIENT)
Dept: PHARMACY | Facility: CLINIC | Age: 47
End: 2022-06-07
Payer: COMMERCIAL

## 2022-06-07 DIAGNOSIS — Z23 NEED FOR VACCINATION: Primary | ICD-10-CM

## 2022-06-29 NOTE — PROGRESS NOTES
CHIEF COMPLAINT: Type 2 Diabetes     HPI: Mrs. Tete Liu is a 46 y.o. female who was diagnosed with Type 2 DM in 11/2/2021.   Past Medical History:   Diagnosis Date    Allergy     Asthma     adult onset    Type 2 diabetes mellitus     diagnosed 11/2021     a1c 6.3% 3 years ago  New onset x 8 years ago.  Last seen by me in winter 2021/2022  Last visit stopped lantus.     Gym 3-4 days a week  Walking 3-4 x a week   Loves ozempic  Intermittent fasting   12n-8p   Not the best with carb counting.  Black coffee in am   Loss 26-28# from 11/2021.     Colonoscopy recently, gi issue in the past 6 months   BG 1 x a day     a1c went from >14% to 6.6% to 5.4%     Lab Results   Component Value Date    HGBA1C 5.4 04/27/2022     Tracking BG 1x a day     Dietary habits:  Working on carb intake    Ground turkey   Chicken  No bread  Arsalan   charli seeds  Fruits - berries, apples    Diabetes code, intermittent- interest    Exercise: 5000 steps intentional   Works from desk   Walks dog      PREVIOUS DIABETES MEDICATIONS TRIED  lantus   Metformin   ozempic    CURRENT DIABETIC MEDS: metformin xr 1000 mg bid, ozempic 1 mg weekly, jardiance 10 mg daily     Makes frequent changes to his/her insulin regimen on the basis of blood glucose data  Patient is willing and able to use the device  Demonstrated an understanding of the technology and is motivated to use CGM  Patient expected to adhere to a comprehensive diabetes treatment plan and patient has adequate medical supervision  Has multiple impaired awareness of hypoglycemia (hypoglycemia unawareness)      Diabetes Management Status    Statin: Taking  ACE/ARB: Not taking    Screening or Prevention Patient's value Goal Complete/Controlled?   HgA1C Testing and Control   Lab Results   Component Value Date    HGBA1C 5.4 04/27/2022      Annually/Less than 8% Yes   Lipid profile : 02/08/2022 Annually Yes   LDL control Lab Results   Component Value Date    LDLCALC 56.6 (L) 02/08/2022     "Annually/Less than 100 mg/dl  Yes   Nephropathy screening Lab Results   Component Value Date    LABMICR 18.0 12/06/2021     Lab Results   Component Value Date    PROTEINUA Trace (A) 07/06/2017    Annually No   Blood pressure BP Readings from Last 1 Encounters:   05/02/22 102/70    Less than 140/90 Yes   Dilated retinal exam Most Recent Eye Exam Date: Not Found Annually Yes   Foot exam   : 12/06/2021 Annually Yes     REVIEW OF SYSTEMS  General: no weakness, fatigue, +weight changes 26# loss (over the past 4 months).   Eyes: no double or blurred vision, eye pain, or redness  Cardiovascular: no chest pain, palpitations, edema, or murmurs.   Respiratory: no cough or dyspnea. +asthma  GI: no heartburn, nausea, or changes in bowel patterns; good appetite. +flatulence , +constipation at times  Skin: no rashes, dryness, itching, or reactions at insulin injection sites.  Neuro: no numbness, tingling, tremors, or vertigo. (L) hand pain-radiates to other digits (since 5th digit trauma)  Endocrine: no polyuria, polydipsia, polyphagia, heat or cold intolerance.     Vital Signs  /76 (BP Location: Left arm, Patient Position: Sitting, BP Method: Medium (Manual))   Pulse 78   Ht 5' 5" (1.651 m)   Wt 81.2 kg (179 lb 0.2 oz)   LMP 06/20/2021 (Approximate)   SpO2 (!) 94%   BMI 29.79 kg/m²     Hemoglobin A1C   Date Value Ref Range Status   04/27/2022 5.4 4.0 - 5.6 % Final     Comment:     ADA Screening Guidelines:  5.7-6.4%  Consistent with prediabetes  >or=6.5%  Consistent with diabetes    High levels of fetal hemoglobin interfere with the HbA1C  assay. Heterozygous hemoglobin variants (HbS, HgC, etc)do  not significantly interfere with this assay.   However, presence of multiple variants may affect accuracy.     02/08/2022 6.6 (H) 4.0 - 5.6 % Final     Comment:     ADA Screening Guidelines:  5.7-6.4%  Consistent with prediabetes  >or=6.5%  Consistent with diabetes    High levels of fetal hemoglobin interfere with the " HbA1C  assay. Heterozygous hemoglobin variants (HbS, HgC, etc)do  not significantly interfere with this assay.   However, presence of multiple variants may affect accuracy.     11/02/2021 >14.0 (H) 4.0 - 5.6 % Final     Comment:     ADA Screening Guidelines:  5.7-6.4%  Consistent with prediabetes  >or=6.5%  Consistent with diabetes    High levels of fetal hemoglobin interfere with the HbA1C  assay. Heterozygous hemoglobin variants (HbS, HgC, etc)do  not significantly interfere with this assay.   However, presence of multiple variants may affect accuracy.          Chemistry        Component Value Date/Time     02/08/2022 0753    K 3.8 02/08/2022 0753     02/08/2022 0753    CO2 28 02/08/2022 0753    BUN 8 02/08/2022 0753    CREATININE 0.7 02/08/2022 0753     02/08/2022 0753        Component Value Date/Time    CALCIUM 10.0 02/08/2022 0753    ALKPHOS 144 (H) 11/02/2021 0826    AST 29 11/02/2021 0826    ALT 44 11/02/2021 0826    BILITOT 0.6 11/02/2021 0826    ESTGFRAFRICA >60.0 02/08/2022 0753    EGFRNONAA >60.0 02/08/2022 0753           Lab Results   Component Value Date    TSH 1.550 11/02/2021      Lab Results   Component Value Date    CHOL 113 (L) 02/08/2022    CHOL 251 (H) 11/02/2021    CHOL 193 05/19/2018     Lab Results   Component Value Date    HDL 37 (L) 02/08/2022    HDL 39 (L) 11/02/2021    HDL 42 05/19/2018     Lab Results   Component Value Date    LDLCALC 56.6 (L) 02/08/2022    LDLCALC Invalid, Trig>400.0 11/02/2021    LDLCALC 115.8 05/19/2018     Lab Results   Component Value Date    TRIG 97 02/08/2022    TRIG 426 (H) 11/02/2021    TRIG 176 (H) 05/19/2018     Lab Results   Component Value Date    CHOLHDL 32.7 02/08/2022    CHOLHDL 15.5 (L) 11/02/2021    CHOLHDL 21.8 05/19/2018         PHYSICAL EXAMINATION  Constitutional: Appears well, no distress Reviewed vitals above.  Eyes: conjunctivae & lids intact; PERRLA, EOMs intact; optic discs   Neck: Supple, trachea midline.   Respiratory: CTA  without wheezes, even and unlabored  Cardiovascular: RRR;no edema or varicosities  Lymph: deferred   Skin: warm and dry; no injection site reactions  Neuro:patient alert and cooperative, normal affect; steady gait.  Psychiatric: judgement & insight intact, orientation of time, place & person intact, memory; mood & affect wnl     Diabetes Foot Exam:   Deferred     Assessment/Plan    1. Type 2 diabetes mellitus with hyperglycemia, without long-term current use of insulin  Hemoglobin A1C    Hemoglobin A1C    Ambulatory referral/consult to Optometry   2. Obesity (BMI 30-39.9)     3. Hyperlipidemia, unspecified hyperlipidemia type     4. Counseling and coordination of care     1-4.  F/u in 6 months w/ me   Updated refills  a1c in 3 months   a1c in 6 months   a1c goal less than 7%   Optometry 2022   Body mass index is 29.79 kg/m².  May increase insulin resistance  Lab Results   Component Value Date    LDLCALC 56.6 (L) 02/08/2022     At goal     FOLLOW UP  Follow up in about 6 months (around 12/30/2022).

## 2022-06-30 ENCOUNTER — OFFICE VISIT (OUTPATIENT)
Dept: INTERNAL MEDICINE | Facility: CLINIC | Age: 47
End: 2022-06-30
Payer: COMMERCIAL

## 2022-06-30 VITALS
OXYGEN SATURATION: 94 % | HEART RATE: 78 BPM | BODY MASS INDEX: 29.82 KG/M2 | SYSTOLIC BLOOD PRESSURE: 114 MMHG | DIASTOLIC BLOOD PRESSURE: 76 MMHG | HEIGHT: 65 IN | WEIGHT: 179 LBS

## 2022-06-30 DIAGNOSIS — Z71.89 COUNSELING AND COORDINATION OF CARE: ICD-10-CM

## 2022-06-30 DIAGNOSIS — E66.9 OBESITY (BMI 30-39.9): ICD-10-CM

## 2022-06-30 DIAGNOSIS — E78.5 HYPERLIPIDEMIA, UNSPECIFIED HYPERLIPIDEMIA TYPE: ICD-10-CM

## 2022-06-30 DIAGNOSIS — E11.65 TYPE 2 DIABETES MELLITUS WITH HYPERGLYCEMIA, WITHOUT LONG-TERM CURRENT USE OF INSULIN: Primary | ICD-10-CM

## 2022-06-30 PROCEDURE — 1159F PR MEDICATION LIST DOCUMENTED IN MEDICAL RECORD: ICD-10-PCS | Mod: CPTII,S$GLB,, | Performed by: NURSE PRACTITIONER

## 2022-06-30 PROCEDURE — 3008F PR BODY MASS INDEX (BMI) DOCUMENTED: ICD-10-PCS | Mod: CPTII,S$GLB,, | Performed by: NURSE PRACTITIONER

## 2022-06-30 PROCEDURE — 3074F SYST BP LT 130 MM HG: CPT | Mod: CPTII,S$GLB,, | Performed by: NURSE PRACTITIONER

## 2022-06-30 PROCEDURE — 3044F PR MOST RECENT HEMOGLOBIN A1C LEVEL <7.0%: ICD-10-PCS | Mod: CPTII,S$GLB,, | Performed by: NURSE PRACTITIONER

## 2022-06-30 PROCEDURE — 1159F MED LIST DOCD IN RCRD: CPT | Mod: CPTII,S$GLB,, | Performed by: NURSE PRACTITIONER

## 2022-06-30 PROCEDURE — 99999 PR PBB SHADOW E&M-EST. PATIENT-LVL IV: CPT | Mod: PBBFAC,,, | Performed by: NURSE PRACTITIONER

## 2022-06-30 PROCEDURE — 3078F PR MOST RECENT DIASTOLIC BLOOD PRESSURE < 80 MM HG: ICD-10-PCS | Mod: CPTII,S$GLB,, | Performed by: NURSE PRACTITIONER

## 2022-06-30 PROCEDURE — 1160F PR REVIEW ALL MEDS BY PRESCRIBER/CLIN PHARMACIST DOCUMENTED: ICD-10-PCS | Mod: CPTII,S$GLB,, | Performed by: NURSE PRACTITIONER

## 2022-06-30 PROCEDURE — 3044F HG A1C LEVEL LT 7.0%: CPT | Mod: CPTII,S$GLB,, | Performed by: NURSE PRACTITIONER

## 2022-06-30 PROCEDURE — 99999 PR PBB SHADOW E&M-EST. PATIENT-LVL IV: ICD-10-PCS | Mod: PBBFAC,,, | Performed by: NURSE PRACTITIONER

## 2022-06-30 PROCEDURE — 99214 PR OFFICE/OUTPT VISIT, EST, LEVL IV, 30-39 MIN: ICD-10-PCS | Mod: S$GLB,,, | Performed by: NURSE PRACTITIONER

## 2022-06-30 PROCEDURE — 1160F RVW MEDS BY RX/DR IN RCRD: CPT | Mod: CPTII,S$GLB,, | Performed by: NURSE PRACTITIONER

## 2022-06-30 PROCEDURE — 3078F DIAST BP <80 MM HG: CPT | Mod: CPTII,S$GLB,, | Performed by: NURSE PRACTITIONER

## 2022-06-30 PROCEDURE — 3008F BODY MASS INDEX DOCD: CPT | Mod: CPTII,S$GLB,, | Performed by: NURSE PRACTITIONER

## 2022-06-30 PROCEDURE — 99214 OFFICE O/P EST MOD 30 MIN: CPT | Mod: S$GLB,,, | Performed by: NURSE PRACTITIONER

## 2022-06-30 PROCEDURE — 3074F PR MOST RECENT SYSTOLIC BLOOD PRESSURE < 130 MM HG: ICD-10-PCS | Mod: CPTII,S$GLB,, | Performed by: NURSE PRACTITIONER

## 2022-06-30 RX ORDER — SEMAGLUTIDE 1.34 MG/ML
1 INJECTION, SOLUTION SUBCUTANEOUS
Qty: 1 PEN | Refills: 11 | Status: SHIPPED | OUTPATIENT
Start: 2022-06-30 | End: 2023-06-29 | Stop reason: SDUPTHER

## 2022-06-30 NOTE — PATIENT INSTRUCTIONS
Follow up in 6 months w/ Brittany   A1c in 3 months   A1c in 6 months    Lab Results   Component Value Date    HGBA1C 5.4 04/27/2022     Goal less than 7%    [email or web address]  [employer]at fit gina  [employer] g[employer]  [email or web address]     Goal  no higher than 150     Ozempic 1 mg weekly   Jardiance 10 mg daily   Metformin xr 1000 mg twice a day

## 2022-07-08 ENCOUNTER — PATIENT MESSAGE (OUTPATIENT)
Dept: ALLERGY | Facility: CLINIC | Age: 47
End: 2022-07-08
Payer: COMMERCIAL

## 2022-07-13 ENCOUNTER — PATIENT MESSAGE (OUTPATIENT)
Dept: INTERNAL MEDICINE | Facility: CLINIC | Age: 47
End: 2022-07-13
Payer: COMMERCIAL

## 2022-07-13 DIAGNOSIS — J45.20 ASTHMA IN ADULT, MILD INTERMITTENT, UNCOMPLICATED: ICD-10-CM

## 2022-07-14 RX ORDER — ALBUTEROL SULFATE 90 UG/1
2 AEROSOL, METERED RESPIRATORY (INHALATION) EVERY 4 HOURS PRN
Qty: 18 G | Refills: 3 | Status: SHIPPED | OUTPATIENT
Start: 2022-07-14 | End: 2022-09-06 | Stop reason: SDUPTHER

## 2022-08-29 NOTE — PROGRESS NOTES
I have reviewed the notes, assessments, and/or procedures performed by Dr. Tanner, I concur with her/his documentation of Tete Liu.

## 2022-09-04 ENCOUNTER — PATIENT MESSAGE (OUTPATIENT)
Dept: ENDOCRINOLOGY | Facility: CLINIC | Age: 47
End: 2022-09-04
Payer: COMMERCIAL

## 2022-09-28 ENCOUNTER — PATIENT OUTREACH (OUTPATIENT)
Dept: ADMINISTRATIVE | Facility: HOSPITAL | Age: 47
End: 2022-09-28
Payer: COMMERCIAL

## 2022-09-28 ENCOUNTER — PATIENT MESSAGE (OUTPATIENT)
Dept: ADMINISTRATIVE | Facility: HOSPITAL | Age: 47
End: 2022-09-28
Payer: COMMERCIAL

## 2022-09-28 NOTE — PROGRESS NOTES
Health Maintenance Due   Topic Date Due    Pneumococcal Vaccines (Age 0-64) (1 - PCV) Never done    Eye Exam  Never done    COVID-19 Vaccine (5 - Booster for Moderna series) 08/02/2022    Influenza Vaccine (1) 09/01/2022     Triggered LINKS and reconciled immunizations. Updated Care Everywhere. Portal message sent asking pt to schedule diabetic eye exam. Chart review completed.

## 2022-09-30 ENCOUNTER — LAB VISIT (OUTPATIENT)
Dept: LAB | Facility: HOSPITAL | Age: 47
End: 2022-09-30
Payer: COMMERCIAL

## 2022-09-30 DIAGNOSIS — E11.65 TYPE 2 DIABETES MELLITUS WITH HYPERGLYCEMIA, WITHOUT LONG-TERM CURRENT USE OF INSULIN: ICD-10-CM

## 2022-09-30 LAB
ESTIMATED AVG GLUCOSE: 105 MG/DL (ref 68–131)
HBA1C MFR BLD: 5.3 % (ref 4–5.6)

## 2022-09-30 PROCEDURE — 36415 COLL VENOUS BLD VENIPUNCTURE: CPT | Performed by: NURSE PRACTITIONER

## 2022-09-30 PROCEDURE — 83036 HEMOGLOBIN GLYCOSYLATED A1C: CPT | Performed by: NURSE PRACTITIONER

## 2022-10-21 ENCOUNTER — OFFICE VISIT (OUTPATIENT)
Dept: INTERNAL MEDICINE | Facility: CLINIC | Age: 47
End: 2022-10-21
Payer: COMMERCIAL

## 2022-10-21 VITALS
OXYGEN SATURATION: 96 % | SYSTOLIC BLOOD PRESSURE: 108 MMHG | WEIGHT: 183 LBS | HEART RATE: 73 BPM | RESPIRATION RATE: 18 BRPM | BODY MASS INDEX: 30.49 KG/M2 | HEIGHT: 65 IN | DIASTOLIC BLOOD PRESSURE: 70 MMHG

## 2022-10-21 DIAGNOSIS — J45.20 ASTHMA IN ADULT, MILD INTERMITTENT, UNCOMPLICATED: ICD-10-CM

## 2022-10-21 DIAGNOSIS — M75.81 ROTATOR CUFF TENDONITIS, RIGHT: ICD-10-CM

## 2022-10-21 DIAGNOSIS — E11.65 TYPE 2 DIABETES MELLITUS WITH HYPERGLYCEMIA, WITHOUT LONG-TERM CURRENT USE OF INSULIN: ICD-10-CM

## 2022-10-21 DIAGNOSIS — Z00.00 ROUTINE GENERAL MEDICAL EXAMINATION AT A HEALTH CARE FACILITY: Primary | ICD-10-CM

## 2022-10-21 PROCEDURE — 1160F PR REVIEW ALL MEDS BY PRESCRIBER/CLIN PHARMACIST DOCUMENTED: ICD-10-PCS | Mod: CPTII,S$GLB,, | Performed by: INTERNAL MEDICINE

## 2022-10-21 PROCEDURE — 3078F PR MOST RECENT DIASTOLIC BLOOD PRESSURE < 80 MM HG: ICD-10-PCS | Mod: CPTII,S$GLB,, | Performed by: INTERNAL MEDICINE

## 2022-10-21 PROCEDURE — 3078F DIAST BP <80 MM HG: CPT | Mod: CPTII,S$GLB,, | Performed by: INTERNAL MEDICINE

## 2022-10-21 PROCEDURE — 1160F RVW MEDS BY RX/DR IN RCRD: CPT | Mod: CPTII,S$GLB,, | Performed by: INTERNAL MEDICINE

## 2022-10-21 PROCEDURE — 99396 PREV VISIT EST AGE 40-64: CPT | Mod: S$GLB,,, | Performed by: INTERNAL MEDICINE

## 2022-10-21 PROCEDURE — 1159F PR MEDICATION LIST DOCUMENTED IN MEDICAL RECORD: ICD-10-PCS | Mod: CPTII,S$GLB,, | Performed by: INTERNAL MEDICINE

## 2022-10-21 PROCEDURE — 99999 PR PBB SHADOW E&M-EST. PATIENT-LVL V: ICD-10-PCS | Mod: PBBFAC,,, | Performed by: INTERNAL MEDICINE

## 2022-10-21 PROCEDURE — 3074F SYST BP LT 130 MM HG: CPT | Mod: CPTII,S$GLB,, | Performed by: INTERNAL MEDICINE

## 2022-10-21 PROCEDURE — 3074F PR MOST RECENT SYSTOLIC BLOOD PRESSURE < 130 MM HG: ICD-10-PCS | Mod: CPTII,S$GLB,, | Performed by: INTERNAL MEDICINE

## 2022-10-21 PROCEDURE — 3044F HG A1C LEVEL LT 7.0%: CPT | Mod: CPTII,S$GLB,, | Performed by: INTERNAL MEDICINE

## 2022-10-21 PROCEDURE — 3044F PR MOST RECENT HEMOGLOBIN A1C LEVEL <7.0%: ICD-10-PCS | Mod: CPTII,S$GLB,, | Performed by: INTERNAL MEDICINE

## 2022-10-21 PROCEDURE — 99396 PR PREVENTIVE VISIT,EST,40-64: ICD-10-PCS | Mod: S$GLB,,, | Performed by: INTERNAL MEDICINE

## 2022-10-21 PROCEDURE — 1159F MED LIST DOCD IN RCRD: CPT | Mod: CPTII,S$GLB,, | Performed by: INTERNAL MEDICINE

## 2022-10-21 PROCEDURE — 99999 PR PBB SHADOW E&M-EST. PATIENT-LVL V: CPT | Mod: PBBFAC,,, | Performed by: INTERNAL MEDICINE

## 2022-10-21 NOTE — PROGRESS NOTES
Subjective:       Patient ID: Tete Liu is a 46 y.o. female.    Chief Complaint: Establish Care    Here for annual exam/est care.    Chronic R shoulder pains, with elevating the shoulder. No injury.    Lab Results       Component                Value               Date                       HGBA1C                   5.3                 09/30/2022                DM2:  good med adherence  has changed Diet  < 140 AM sugars  <200 Post-prandial sugars  no Numbness in feet  no sores in feet  no Visual changes  no Polyuria/polydipsia.          Review of Systems   Constitutional:  Negative for activity change, appetite change and unexpected weight change.   Eyes:  Negative for visual disturbance.   Respiratory:  Negative for cough, chest tightness and shortness of breath.    Cardiovascular:  Negative for chest pain.   Gastrointestinal:  Negative for abdominal distention and abdominal pain.   Genitourinary:  Negative for difficulty urinating and urgency.        No breast lumps/masses/pain/nipple d/c in either breast     Musculoskeletal:  Positive for arthralgias (R shoulder).   Skin:  Negative for rash.         Objective:      Physical Exam  Vitals reviewed.   Constitutional:       General: She is not in acute distress.     Appearance: Normal appearance. She is well-developed. She is not ill-appearing, toxic-appearing or diaphoretic.   HENT:      Head: Normocephalic and atraumatic.   Eyes:      General: No scleral icterus.     Pupils: Pupils are equal, round, and reactive to light.   Neck:      Thyroid: No thyromegaly.   Cardiovascular:      Rate and Rhythm: Normal rate and regular rhythm.      Heart sounds: Normal heart sounds. No murmur heard.    No friction rub. No gallop.   Pulmonary:      Effort: Pulmonary effort is normal. No respiratory distress.      Breath sounds: Normal breath sounds. No wheezing or rales.   Abdominal:      General: Bowel sounds are normal. There is no distension.      Palpations: Abdomen is soft.  There is no mass.      Tenderness: There is no abdominal tenderness. There is no guarding or rebound.   Musculoskeletal:         General: No tenderness. Normal range of motion.      Cervical back: Normal range of motion.      Comments: R shoulder painful arc, normal strength -- No weakness detected   Lymphadenopathy:      Cervical: No cervical adenopathy.   Neurological:      General: No focal deficit present.      Mental Status: She is alert and oriented to person, place, and time.   Psychiatric:         Mood and Affect: Mood normal.         Speech: Speech normal.         Behavior: Behavior normal.       Assessment:       1. Routine general medical examination at a health care facility    2. Asthma in adult, mild intermittent, uncomplicated    3. Type 2 diabetes mellitus with hyperglycemia, without long-term current use of insulin    4. Rotator cuff tendonitis, right        Plan:       Tete was seen today for establish care.    Diagnoses and all orders for this visit:    Routine general medical examination at a health care facility      Asthma in adult, mild intermittent, uncomplicated  controlled    Type 2 diabetes mellitus with hyperglycemia, without long-term current use of insulin  Controlled, I reviewed recent blood work results with the patient.      Rotator cuff tendonitis, right  -     Ambulatory referral/consult to Orthopedics; Future        Health Maintenance         Date Due Completion Date    Pneumococcal Vaccines (Age 0-64) (1 - PCV) Never done ---    Eye Exam Never done ---    COVID-19 Vaccine (5 - Booster for Moderna series) 08/02/2022 6/7/2022    Influenza Vaccine (1) 09/01/2022 12/8/2021    Diabetes Urine Screening 12/06/2022 12/6/2021    Foot Exam 12/06/2022 12/6/2021    Override on 12/6/2021: Done    Lipid Panel 02/08/2023 2/8/2022    Mammogram 02/10/2023 2/10/2022    Hemoglobin A1c 03/30/2023 9/30/2022    Cervical Cancer Screening 04/03/2023 4/12/2019    Low Dose Statin 10/21/2023 10/21/2022     TETANUS VACCINE 09/26/2026 9/26/2016    Colorectal Cancer Screening 10/06/2030 10/6/2020        Sees My eye doctor Ivy suarez  Discussed:  Patient Instructions     Health Maintenance Due   Topic Date Due              COVID-19 Vaccine (5 - Booster for Moderna series) 08/02/2022    Influenza Vaccine (1) 09/01/2022         Follow up in about 1 year (around 10/21/2023).    Future Appointments   Date Time Provider Department Center   10/31/2022 10:30 AM Vanessa Pickett PA-C [facility] ORTHO [facility]   1/6/2023  8:30 AM [facility]   1/19/2023  1:00 PM ESPERANZA Roberts, AKHILP [facility]

## 2022-10-21 NOTE — PATIENT INSTRUCTIONS
Health Maintenance Due   Topic Date Due              COVID-19 Vaccine (5 - Booster for Moderna series) 08/02/2022    Influenza Vaccine (1) 09/01/2022

## 2022-10-31 ENCOUNTER — OFFICE VISIT (OUTPATIENT)
Dept: ORTHOPEDICS | Facility: CLINIC | Age: 47
End: 2022-10-31
Payer: COMMERCIAL

## 2022-10-31 ENCOUNTER — HOSPITAL ENCOUNTER (OUTPATIENT)
Dept: RADIOLOGY | Facility: HOSPITAL | Age: 47
Discharge: HOME | End: 2022-10-31
Attending: PHYSICIAN ASSISTANT
Payer: COMMERCIAL

## 2022-10-31 VITALS — HEIGHT: 65 IN | BODY MASS INDEX: 30.39 KG/M2 | WEIGHT: 182.44 LBS

## 2022-10-31 DIAGNOSIS — M75.81 ROTATOR CUFF TENDONITIS, RIGHT: ICD-10-CM

## 2022-10-31 DIAGNOSIS — M75.01 ADHESIVE CAPSULITIS OF RIGHT SHOULDER: Primary | ICD-10-CM

## 2022-10-31 PROCEDURE — 73030 X-RAY EXAM OF SHOULDER: CPT | Mod: 26,RT,, | Performed by: RADIOLOGY

## 2022-10-31 PROCEDURE — 1160F RVW MEDS BY RX/DR IN RCRD: CPT | Mod: CPTII,S$GLB,, | Performed by: PHYSICIAN ASSISTANT

## 2022-10-31 PROCEDURE — 1159F PR MEDICATION LIST DOCUMENTED IN MEDICAL RECORD: ICD-10-PCS | Mod: CPTII,S$GLB,, | Performed by: PHYSICIAN ASSISTANT

## 2022-10-31 PROCEDURE — 99203 PR OFFICE/OUTPT VISIT, NEW, LEVL III, 30-44 MIN: ICD-10-PCS | Mod: S$GLB,,, | Performed by: PHYSICIAN ASSISTANT

## 2022-10-31 PROCEDURE — 99999 PR PBB SHADOW E&M-EST. PATIENT-LVL IV: ICD-10-PCS | Mod: PBBFAC,,, | Performed by: PHYSICIAN ASSISTANT

## 2022-10-31 PROCEDURE — 1159F MED LIST DOCD IN RCRD: CPT | Mod: CPTII,S$GLB,, | Performed by: PHYSICIAN ASSISTANT

## 2022-10-31 PROCEDURE — 99203 OFFICE O/P NEW LOW 30 MIN: CPT | Mod: S$GLB,,, | Performed by: PHYSICIAN ASSISTANT

## 2022-10-31 PROCEDURE — 73030 X-RAY EXAM OF SHOULDER: CPT | Mod: TC,RT

## 2022-10-31 PROCEDURE — 1160F PR REVIEW ALL MEDS BY PRESCRIBER/CLIN PHARMACIST DOCUMENTED: ICD-10-PCS | Mod: CPTII,S$GLB,, | Performed by: PHYSICIAN ASSISTANT

## 2022-10-31 PROCEDURE — 3044F PR MOST RECENT HEMOGLOBIN A1C LEVEL <7.0%: ICD-10-PCS | Mod: CPTII,S$GLB,, | Performed by: PHYSICIAN ASSISTANT

## 2022-10-31 PROCEDURE — 99999 PR PBB SHADOW E&M-EST. PATIENT-LVL IV: CPT | Mod: PBBFAC,,, | Performed by: PHYSICIAN ASSISTANT

## 2022-10-31 PROCEDURE — 3044F HG A1C LEVEL LT 7.0%: CPT | Mod: CPTII,S$GLB,, | Performed by: PHYSICIAN ASSISTANT

## 2022-10-31 PROCEDURE — 73030 XR SHOULDER COMPLETE 2 OR MORE VIEWS RIGHT: ICD-10-PCS | Mod: 26,RT,, | Performed by: RADIOLOGY

## 2022-11-01 NOTE — PROGRESS NOTES
SUBJECTIVE:     Chief Complaint & History of Present Illness:  Tete Liu is a 47 y.o. year old female who presents today with constant right shoulder pain that started about 9 months ago.  She is Right hand dominant.  There is not a history of injury.  She has a desk job. She has been exercising more regularly over the past year. The pain is located in the  lateral and  upper arm aspect of the shoulder.  The pain is described as achy.  It is aggravated by lifting, reaching, overhead activity.  Previous treatments include rest, home exercises, which have provided minimal relief.  There is not a history of previous injury or surgery to the shoulder.      Review of patient's allergies indicates:  No Known Allergies      Current Outpatient Medications   Medication Sig Dispense Refill    albuterol (PROVENTIL/VENTOLIN HFA) 90 mcg/actuation inhaler Inhale 2 puffs into the lungs every 4 (four) hours as needed for Wheezing. Rescue 18 g 2    atorvastatin (LIPITOR) 40 MG tablet Take 1 tablet (40 mg total) by mouth once daily. 30 tablet 11    blood sugar diagnostic Strp To check BG 4 times daily, to use with insurance preferred meter 200 each 11    blood-glucose meter kit To check BG 4 times daily, to use with insurance preferred meter 1 each 0    cetirizine (ZYRTEC) 10 MG tablet Take 10 mg by mouth once daily.      empagliflozin (JARDIANCE) 10 mg tablet Take 1 tablet (10 mg total) by mouth once daily. 30 tablet 6    fluticasone-salmeterol diskus inhaler 100-50 mcg Inhale 1 puff into the lungs 2 (two) times daily. Controller 60 each 11    lancets Misc To check BG 4 times daily, to use with insurance preferred meter 200 each 11    metFORMIN (GLUCOPHAGE-XR) 500 MG ER 24hr tablet Take 2 tablets (1,000 mg total) by mouth 2 (two) times daily with meals. Start with 500 mg once daily with dinner for 1 week, then increase to 500 mg twice daily with meals. If tolerating this dose can increase to 1000 mg by mouth twice daily with  meals 120 tablet 11    semaglutide (OZEMPIC) 1 mg/dose (4 mg/3 mL) Inject 1 mg into the skin every 7 days. 1 pen 11    ULTRA THIN LANCETS 30 gauge Misc TO CHECK BG 4 TIMES DAILY, TO USE WITH INSURANCE PREFERRED METER       No current facility-administered medications for this visit.       Past Medical History:   Diagnosis Date    Allergy     Asthma     adult onset    Type 2 diabetes mellitus     diagnosed 2021       Past Surgical History:   Procedure Laterality Date     SECTION, LOW TRANSVERSE      COLONOSCOPY N/A 10/6/2020    Procedure: COLONOSCOPY;  Surgeon: Juanita Antonio MD;  Location: [facility];  Service: Endoscopy;  Laterality: N/A;       Review of Systems:  ROS:  Constitutional: no fever or chills  Eyes: no visual changes  ENT: no nasal congestion or sore throat  Respiratory: no cough or shortness of breath  Musculoskeletal: no arthralgias or myalgias      OBJECTIVE:     PHYSICAL EXAM:    General: Weight: 82.7 kg (182 lb 6.9 oz) Body mass index is 30.36 kg/m².  Patient is alert, awake and oriented to time, place and person. Mood and affect are appropriate.  Patient does not appear to be in any distress, denies any constitutional symptoms and appears stated age.   HEENT: Pupils are equal and round, sclera are not injected. External examination of ears and nose reveals no abnormalities. Cranial nerves II-X are grossly intact  Neck: examination demonstrates painless active range of motion. Spurling's sign is negative  Skin: no rashes, abrasions or open wounds on the affected extremity   Resp: No respiratory distress or audible wheezing   Psych:  normal mood and behavior  CV: 2+  pulses, all extremities warm and well perfused   Right Shoulder   Skin intact  No effusion or warmth  Tenderness: none  Range of motion is painful   ROM: forward flexion 160, external rotation 35, internal rotation SI joint  Shoulder Strength: biceps 5/5, triceps 5/5, abduction 5/5, adduction 5/5  positive for impingement  sign, sensory exam normal, and motor exam normal  Special Tests:    Crossbody test: negative    Neer's positive  Hawkin's positive    Tata's negative  Drop arm negative  IMAGING:  X-rays of the right shoulder, personally reviewed by me, demonstrate well maintained joint space.  No fracture or dislocation.     ASSESSMENT/PLAN:   47 y.o. year old female with right shoulder adhesive capsulitis    Plan: Discussed with the patient at length all the different treatment options available for her right shoulder including anti-inflammatories, acetaminophen, rest, ice, Physical therapy, occasional cortisone injections for temporary relief, and shoulder arthroscopy    - Recommend PT  - Discussed rest, activity modification  - We can consider CSI  - Ok to take NSAIDS as needed  - Follow up 6-8 weeks if symptoms worsen or fail to improve

## 2022-11-04 ENCOUNTER — PATIENT OUTREACH (OUTPATIENT)
Dept: ADMINISTRATIVE | Facility: HOSPITAL | Age: 47
End: 2022-11-04
Payer: COMMERCIAL

## 2022-11-04 ENCOUNTER — PATIENT MESSAGE (OUTPATIENT)
Dept: ADMINISTRATIVE | Facility: HOSPITAL | Age: 47
End: 2022-11-04
Payer: COMMERCIAL

## 2022-11-09 ENCOUNTER — CLINICAL SUPPORT (OUTPATIENT)
Dept: REHABILITATION | Facility: HOSPITAL | Age: 47
End: 2022-11-09
Payer: COMMERCIAL

## 2022-11-09 DIAGNOSIS — R29.898 WEAKNESS OF SHOULDER: ICD-10-CM

## 2022-11-09 DIAGNOSIS — M25.511 CHRONIC RIGHT SHOULDER PAIN: ICD-10-CM

## 2022-11-09 DIAGNOSIS — M75.01 ADHESIVE CAPSULITIS OF RIGHT SHOULDER: ICD-10-CM

## 2022-11-09 DIAGNOSIS — M75.81 ROTATOR CUFF TENDONITIS, RIGHT: ICD-10-CM

## 2022-11-09 DIAGNOSIS — G89.29 CHRONIC RIGHT SHOULDER PAIN: ICD-10-CM

## 2022-11-09 DIAGNOSIS — M25.611 SHOULDER STIFFNESS, RIGHT: Primary | ICD-10-CM

## 2022-11-10 NOTE — PLAN OF CARE
[facility]  [place] [id number]  Physical Therapy Initial Evaluation    Date: 2022   Name: Tete Liu  Clinic Number: [id number]    Therapy Diagnosis:   Encounter Diagnoses   Name Primary?    Rotator cuff tendonitis, right     Adhesive capsulitis of right shoulder     Shoulder stiffness, right Yes    Weakness of shoulder     Chronic right shoulder pain      Physician: Vanessa Pickett PA-C    Physician Orders: PT Eval and Treat - Follow up 6-8 weeks if symptoms worsen or fail to improve   Medical Diagnosis from Referral: M75.81 (ICD-10-CM) - Rotator cuff tendonitis, right M75.01 (ICD-10-CM) - Adhesive capsulitis of right shoulder   Evaluation Date: 2022  Authorization Period Expiration: 10/31/2023  Plan of Care Expiration: 2022 (Re-assess on or before 22)  Visit # / Visits authorized:     Time In: 12:00  Time Out: 13:01  Total Appointment Time (timed & untimed codes): 61 minutes    Precautions: Standard    Subjective     History of current condition - Tete reports: right sided shoulder pain and stiffness onset 12 months ago with no apparent cause or injury. Thought that she could stretch and get it better, but it has not improved. She is limited with overhead reaching and hooking her bra in the back. Patient is right hand dominant.     Medical History:   Past Medical History:   Diagnosis Date    Allergy     Asthma     adult onset    Type 2 diabetes mellitus     diagnosed 2021       Surgical History:   Tete Liu  has a past surgical history that includes  section, low transverse and Colonoscopy (N/A, 10/6/2020).    Medications:   Tete has a current medication list which includes the following prescription(s): albuterol, atorvastatin, blood sugar diagnostic, blood-glucose meter, cetirizine, empagliflozin, fluticasone-salmeterol 100-50 mcg/dose, lancets, metformin, ozempic, and ultra thin lancets.    Allergies:   Review of patient's allergies indicates:  No Known  Allergies       Prior Therapy: none  Social History:  lives with their family  Occupation: consultant in early childhood education  Prior Level of Function: able to use right arm for all ADLs   Current Level of Function: difficulty using right arm for reaching, lifting, and fastening bra    Pain:  Current 0/10, worst 8/10, best 0/10   Location: { right shoulder(s)  Description: Variable  Aggravating Factors: Flexing and Lifting  Easing Factors: rest    Pts goals: use right arm normally and move it without pain    Objective       Posture: rounded shoulders      Passive Range of Motion:   Shoulder Right Left   Flexion 135 180   ER at 90 40 90   IR 50 90   NOTE: empty end feel during all PROM on the right       Active Range of Motion:   Shoulder Right Left   Flexion 135 180   Functional ER occiput T3   Functional IR Iliac crest T10     Strength:  Shoulder Right Left   Flexion 3- 5   Abduction 3- 5   ER 3- 5   IR 3 5       Joint Mobility: hypomobile during GHJ inferior, posterior, and anterior glides        TREATMENT   Treatment Time In: 12:25  Treatment Time Out: 13:01  Total Treatment time (time-based codes) separate from Evaluation: 36 minutes    Tete received therapeutic exercises to develop strength, endurance, ROM, flexibility, and posture for 21 minutes including:  AAROM shoulder flexion with dowel  AAROM shoulder ER (in right sidelying)  AAROM shoulder flexion wall slides  Scap shrugs seated  Scap retract seated  Patient education    Tete received the following manual therapy techniques: Joint mobilizations and PROM were applied to the: right shoulder for 15 minutes, including:  Shoulder flexion PROM  Shoulder IR/ER PROM    Tete participated in neuromuscular re-education activities to improve: Coordination, Proprioception, and Posture for 0 minutes. The following activities were included:      Tete participated in dynamic functional therapeutic activities to improve functional performance for 0  minutes,  including:    Tete received hot pack for 5 minutes to right shoulder (during education).      Home Exercises and Patient Education Provided    Education provided:   - HEP, current plan, prognosis    Written Home Exercises Provided: yes.  Exercises were reviewed and patient was able to demonstrate them prior to the end of the session.  Patient demonstrated good  understanding of the education provided.     See EMR under Patient Instructions for exercisesprovided 11/9/2022.    Assessment   Tete is a 47 y.o. female referred to outpatient Physical Therapy with a medical diagnosis of adhesive capsulitis. Findings are consistent with thsi diagnosis. Patient is limited in all planes of active and passive shoulder ROM with empty end feel during PROM. In addition, patient has weakness in right shoulder and limited function because of her impairments.     Pt prognosis is Good.   Pt will benefit from skilled outpatient Physical Therapy to address the deficits stated above and in the chart below, provide pt/family education, and to maximize pt's level of independence.     Plan of care discussed with patient: Yes  Pt's spiritual, cultural and educational needs considered and patient is agreeable to the plan of care and goals as stated below:     Anticipated Barriers for therapy: none    Medical Necessity is demonstrated by the following  History  Co-morbidities and personal factors that may impact the plan of care Co-morbidities:   diabetes    Personal Factors:   no deficits     low   Examination  Body Structures and Functions, activity limitations and participation restrictions that may impact the plan of care Body Regions:   upper extremities    Body Systems:    ROM  strength  transitions  motor control    Participation Restrictions:   covid-19    Activity limitations:   Learning and applying knowledge  no deficits    General Tasks and Commands  no deficits    Communication  no deficits    Mobility  lifting and carrying  objects    Self care  dressing    Domestic Life  no deficits    Interactions/Relationships  no deficits    Life Areas  no deficits    Community and Social Life  no deficits         low   Clinical Presentation stable and uncomplicated low   Decision Making/ Complexity Score: low     Goals:  Short term goals: 8 weeks  Patient will become independent in HEP for maximal gains made in PT.  Patient to improve ROM to 140 degrees shoulder flexion for improvements in reaching activities.      Long term goals: 24 weeks  Patient to demo 170 degrees of shoulder flexion AROM for improved overhead tasks.  Patient will improve strength to 4+/5 for improved performance of household duties.  Patient will improve functional ER/IR to T2 and T10 respectively for improved ability donning/doffing clothing.       Plan   Plan of care Certification: 11/9/2022 to 5/9/2023.    Outpatient Physical Therapy 2 times weekly for 6 months to include the following interventions: Manual Therapy, Moist Heat/ Ice, Neuromuscular Re-ed, Patient Education, Therapeutic Activities, and Therapeutic Exercise.     Refer back to MD if no progress is made in 6-8 weeks.    Judy Arnold, PT, DPT, OCS

## 2022-11-23 ENCOUNTER — PATIENT MESSAGE (OUTPATIENT)
Dept: INTERNAL MEDICINE | Facility: CLINIC | Age: 47
End: 2022-11-23
Payer: COMMERCIAL

## 2022-11-23 DIAGNOSIS — E11.65 TYPE 2 DIABETES MELLITUS WITH HYPERGLYCEMIA, WITHOUT LONG-TERM CURRENT USE OF INSULIN: ICD-10-CM

## 2022-11-23 RX ORDER — METFORMIN HYDROCHLORIDE 500 MG/1
1000 TABLET, EXTENDED RELEASE ORAL 2 TIMES DAILY WITH MEALS
Qty: 120 TABLET | Refills: 11 | Status: CANCELLED | OUTPATIENT
Start: 2022-11-23

## 2022-11-23 RX ORDER — METFORMIN HYDROCHLORIDE 500 MG/1
1000 TABLET, EXTENDED RELEASE ORAL 2 TIMES DAILY WITH MEALS
Qty: 120 TABLET | Refills: 11 | Status: SHIPPED | OUTPATIENT
Start: 2022-11-23 | End: 2023-05-24

## 2022-11-23 NOTE — TELEPHONE ENCOUNTER
Hi, I sent it yesterday and just sent it again.  Please call the patient and let her know.  Thank you, Yosef Jane

## 2022-12-07 ENCOUNTER — PATIENT MESSAGE (OUTPATIENT)
Dept: INTERNAL MEDICINE | Facility: CLINIC | Age: 47
End: 2022-12-07
Payer: COMMERCIAL

## 2022-12-12 ENCOUNTER — PATIENT MESSAGE (OUTPATIENT)
Dept: INTERNAL MEDICINE | Facility: CLINIC | Age: 47
End: 2022-12-12
Payer: COMMERCIAL

## 2022-12-16 ENCOUNTER — IMMUNIZATION (OUTPATIENT)
Dept: INTERNAL MEDICINE | Facility: CLINIC | Age: 47
End: 2022-12-16
Payer: COMMERCIAL

## 2022-12-16 PROCEDURE — 90686 FLU VACCINE (QUAD) GREATER THAN OR EQUAL TO 3YO PRESERVATIVE FREE IM: ICD-10-PCS | Mod: S$GLB,,, | Performed by: INTERNAL MEDICINE

## 2022-12-16 PROCEDURE — 90471 IMMUNIZATION ADMIN: CPT | Mod: S$GLB,,, | Performed by: INTERNAL MEDICINE

## 2022-12-16 PROCEDURE — 90686 IIV4 VACC NO PRSV 0.5 ML IM: CPT | Mod: S$GLB,,, | Performed by: INTERNAL MEDICINE

## 2022-12-16 PROCEDURE — 90471 FLU VACCINE (QUAD) GREATER THAN OR EQUAL TO 3YO PRESERVATIVE FREE IM: ICD-10-PCS | Mod: S$GLB,,, | Performed by: INTERNAL MEDICINE

## 2022-12-28 ENCOUNTER — PATIENT OUTREACH (OUTPATIENT)
Dept: ADMINISTRATIVE | Facility: HOSPITAL | Age: 47
End: 2022-12-28
Payer: COMMERCIAL

## 2022-12-28 NOTE — LETTER
AUTHORIZATION FOR RELEASE OF   CONFIDENTIAL INFORMATION    Dear [facility],    We are seeing Tete Liu, date of birth 1975, in the clinic at [facility]. Yosef Jane MD is the patient's PCP. Tete Liu has an outstanding lab/procedure at the time we reviewed her chart. In order to help keep her health information updated, she has authorized us to request the following medical record(s):        (  )  MAMMOGRAM                                      (  )  COLONOSCOPY      (  )  PAP SMEAR                                          (  )  OUTSIDE LAB RESULTS     (  )  DEXA SCAN                                          ( X )  EYE EXAM            (  )  FOOT EXAM                                          (  )  ENTIRE RECORD     (  )  OUTSIDE IMMUNIZATIONS                 (  )  _______________         Please fax records to Yosef Jane MD, [phone]     If you have any questions, please contact MAJOR Dale at [phone].           Patient Name: Tete Liu  : 1975  Patient Phone #: [phone]

## 2022-12-28 NOTE — PROGRESS NOTES
Health Maintenance Due   Topic Date Due    Pneumococcal Vaccines (Age 0-64) (1 - PCV) Never done    Eye Exam  Never done    COVID-19 Vaccine (5 - Booster for Moderna series) 08/02/2022    Foot Exam  12/06/2022    Diabetes Urine Screening  12/06/2022    Mammogram  02/10/2023     Triggered LINKS and reconciled immunizations. Updated Care Everywhere. Record request sent to [facility] [address] asking for a copy of pt's most recent eye exam results. Chart review completed.

## 2022-12-29 ENCOUNTER — PATIENT MESSAGE (OUTPATIENT)
Dept: INTERNAL MEDICINE | Facility: CLINIC | Age: 47
End: 2022-12-29
Payer: COMMERCIAL

## 2023-01-06 ENCOUNTER — LAB VISIT (OUTPATIENT)
Dept: LAB | Facility: HOSPITAL | Age: 48
End: 2023-01-06
Attending: NURSE PRACTITIONER
Payer: COMMERCIAL

## 2023-01-06 DIAGNOSIS — E11.65 TYPE 2 DIABETES MELLITUS WITH HYPERGLYCEMIA, WITHOUT LONG-TERM CURRENT USE OF INSULIN: ICD-10-CM

## 2023-01-06 LAB
ESTIMATED AVG GLUCOSE: 111 MG/DL (ref 68–131)
HBA1C MFR BLD: 5.5 % (ref 4–5.6)

## 2023-01-06 PROCEDURE — 83036 HEMOGLOBIN GLYCOSYLATED A1C: CPT | Performed by: NURSE PRACTITIONER

## 2023-01-06 PROCEDURE — 36415 COLL VENOUS BLD VENIPUNCTURE: CPT | Performed by: NURSE PRACTITIONER

## 2023-01-18 NOTE — PROGRESS NOTES
CHIEF COMPLAINT: Type 2 Diabetes     HPI: Mrs. Tete Liu is a 47 y.o. female who was diagnosed with Type 2 DM in 11/2/2021.   Past Medical History:   Diagnosis Date    Allergy     Asthma     adult onset    Type 2 diabetes mellitus     diagnosed 11/2021     a1c 6.3% 4 years ago  New onset x 9 years ago.    Last seen by me 6/2022  Stopped lantus > 9 months ago  Reading The Diabetes Code  Interest in intermittent fasting.   Still doing well with ozempic , jardiance 10 mg , metformin xr 1000 mg bid.     Walking 3-4 x a week   Not the best with carb counting.  Black coffee in am   Maintain weight loss 11# over the past year    Colonoscopy recently, gi issue in the past 6 months   BG 1 x a day     A1c stable, below goal over the past 11 months   Goal to wean off antihyperglycemic agents  Traveling to [place] in June 2023.  C/o bilateral tingling in hands, cold feet over the past 4 months     Lab Results   Component Value Date    HGBA1C 5.5 01/06/2023     Tracking BG 1x a day     Dietary habits:  Working on carb intake  Ground turkey   Chicken  No bread  Arsalan   charli seeds  Fruits - berries, apples  See above      Exercise: 5000 steps intentional   Works from desk   Walks dog      PREVIOUS DIABETES MEDICATIONS TRIED  lantus   Metformin   ozempic    CURRENT DIABETIC MEDS: metformin xr 1000 mg bid, ozempic 1 mg weekly, jardiance 10 mg daily     Diabetes Management Status    Statin: Taking  ACE/ARB: Not taking    Screening or Prevention Patient's value Goal Complete/Controlled?   HgA1C Testing and Control   Lab Results   Component Value Date    HGBA1C 5.5 01/06/2023      Annually/Less than 8% Yes   Lipid profile : 02/08/2022 Annually Yes   LDL control Lab Results   Component Value Date    LDLCALC 56.6 (L) 02/08/2022    Annually/Less than 100 mg/dl  Yes   Nephropathy screening Lab Results   Component Value Date    LABMICR 18.0 12/06/2021     Lab Results   Component Value Date    PROTEINUA Trace (A) 07/06/2017    Annually No  "  Blood pressure BP Readings from Last 1 Encounters:   10/21/22 108/70    Less than 140/90 Yes   Dilated retinal exam Most Recent Eye Exam Date: Not Found Annually Yes   Foot exam   : 12/06/2021 Annually Yes     REVIEW OF SYSTEMS  General: no weakness, fatigue, +weight changes 11# over the past 9 months    Eyes: no double or blurred vision, eye pain, or redness  Cardiovascular: no chest pain, palpitations, edema, or murmurs.   Respiratory: no cough or dyspnea. +asthma  GI: no heartburn, nausea, or changes in bowel patterns; good appetite. +flatulence , +constipation at times  Skin: no rashes, dryness, itching, or reactions at insulin injection sites.  Neuro: no numbness, tingling, tremors, or vertigo. (L) hand pain-radiates to other digits (since 5th digit trauma)  Endocrine: no polyuria, polydipsia, polyphagia, heat or cold intolerance.     Vital Signs  /74 (BP Location: Left arm, Patient Position: Sitting, BP Method: Large (Manual))   Pulse 78   Ht 5' 5" (1.651 m)   Wt 84.6 kg (186 lb 8.2 oz)   LMP 06/20/2021 (Approximate)   SpO2 (!) 94%   BMI 31.04 kg/m²     Hemoglobin A1C   Date Value Ref Range Status   01/06/2023 5.5 4.0 - 5.6 % Final     Comment:     ADA Screening Guidelines:  5.7-6.4%  Consistent with prediabetes  >or=6.5%  Consistent with diabetes    High levels of fetal hemoglobin interfere with the HbA1C  assay. Heterozygous hemoglobin variants (HbS, HgC, etc)do  not significantly interfere with this assay.   However, presence of multiple variants may affect accuracy.     09/30/2022 5.3 4.0 - 5.6 % Final     Comment:     ADA Screening Guidelines:  5.7-6.4%  Consistent with prediabetes  >or=6.5%  Consistent with diabetes    High levels of fetal hemoglobin interfere with the HbA1C  assay. Heterozygous hemoglobin variants (HbS, HgC, etc)do  not significantly interfere with this assay.   However, presence of multiple variants may affect accuracy.     04/27/2022 5.4 4.0 - 5.6 % Final     Comment:    "  ADA Screening Guidelines:  5.7-6.4%  Consistent with prediabetes  >or=6.5%  Consistent with diabetes    High levels of fetal hemoglobin interfere with the HbA1C  assay. Heterozygous hemoglobin variants (HbS, HgC, etc)do  not significantly interfere with this assay.   However, presence of multiple variants may affect accuracy.          Chemistry        Component Value Date/Time     02/08/2022 0753    K 3.8 02/08/2022 0753     02/08/2022 0753    CO2 28 02/08/2022 0753    BUN 8 02/08/2022 0753    CREATININE 0.7 02/08/2022 0753     02/08/2022 0753        Component Value Date/Time    CALCIUM 10.0 02/08/2022 0753    ALKPHOS 144 (H) 11/02/2021 0826    AST 29 11/02/2021 0826    ALT 44 11/02/2021 0826    BILITOT 0.6 11/02/2021 0826    ESTGFRAFRICA >60.0 02/08/2022 0753    EGFRNONAA >60.0 02/08/2022 0753           Lab Results   Component Value Date    TSH 1.550 11/02/2021      Lab Results   Component Value Date    CHOL 113 (L) 02/08/2022    CHOL 251 (H) 11/02/2021    CHOL 193 05/19/2018     Lab Results   Component Value Date    HDL 37 (L) 02/08/2022    HDL 39 (L) 11/02/2021    HDL 42 05/19/2018     Lab Results   Component Value Date    LDLCALC 56.6 (L) 02/08/2022    LDLCALC Invalid, Trig>400.0 11/02/2021    LDLCALC 115.8 05/19/2018     Lab Results   Component Value Date    TRIG 97 02/08/2022    TRIG 426 (H) 11/02/2021    TRIG 176 (H) 05/19/2018     Lab Results   Component Value Date    CHOLHDL 32.7 02/08/2022    CHOLHDL 15.5 (L) 11/02/2021    CHOLHDL 21.8 05/19/2018         PHYSICAL EXAMINATION  Constitutional: Appears well, no distress Reviewed vitals above.  Eyes: conjunctivae & lids intact; PERRLA, EOMs intact; optic discs   Neck: Supple, trachea midline.   Respiratory: CTA without wheezes, even and unlabored  Cardiovascular: RRR;no edema or varicosities  Lymph: deferred   Skin: warm and dry; no injection site reactions  Neuro:patient alert and cooperative, normal affect; steady gait.  Psychiatric:  judgement & insight intact, orientation of time, place & person intact, memory; mood & affect wnl     Diabetes Foot Exam: defer to next visit     Assessment/Plan    1. Type 2 diabetes mellitus with hyperglycemia, without long-term current use of insulin  Hemoglobin A1C    Microalbumin/Creatinine Ratio, Urine    Ambulatory referral/consult to Optometry      2. Obesity (BMI 30-39.9)        3. Hyperlipidemia, unspecified hyperlipidemia type        4. Counseling and coordination of care        5. Breast cancer screening by mammogram  Mammo Digital Screening Bilat      1-5. Follow up in 5 months w/ me   A1c urine mac prior   Doing very well   A1c goal less than 6.5%  Discussed goal , no higher than 180   Check sugar levels 2 hours after eating periodically  First wean - sglt2i and metformin   Continue ozempic  Cut above in half first  Cheat days- keep metformin in background or for travel   Mammogram 2023    FOLLOW UP  No follow-ups on file.

## 2023-01-19 ENCOUNTER — PATIENT OUTREACH (OUTPATIENT)
Dept: ADMINISTRATIVE | Facility: HOSPITAL | Age: 48
End: 2023-01-19
Payer: COMMERCIAL

## 2023-01-19 ENCOUNTER — OFFICE VISIT (OUTPATIENT)
Dept: INTERNAL MEDICINE | Facility: CLINIC | Age: 48
End: 2023-01-19
Payer: COMMERCIAL

## 2023-01-19 VITALS
HEART RATE: 78 BPM | HEIGHT: 65 IN | OXYGEN SATURATION: 94 % | SYSTOLIC BLOOD PRESSURE: 108 MMHG | DIASTOLIC BLOOD PRESSURE: 74 MMHG | WEIGHT: 186.5 LBS | BODY MASS INDEX: 31.07 KG/M2

## 2023-01-19 DIAGNOSIS — Z12.31 BREAST CANCER SCREENING BY MAMMOGRAM: ICD-10-CM

## 2023-01-19 DIAGNOSIS — E66.9 OBESITY (BMI 30-39.9): ICD-10-CM

## 2023-01-19 DIAGNOSIS — E78.5 HYPERLIPIDEMIA, UNSPECIFIED HYPERLIPIDEMIA TYPE: ICD-10-CM

## 2023-01-19 DIAGNOSIS — E11.65 TYPE 2 DIABETES MELLITUS WITH HYPERGLYCEMIA, WITHOUT LONG-TERM CURRENT USE OF INSULIN: Primary | ICD-10-CM

## 2023-01-19 DIAGNOSIS — Z71.89 COUNSELING AND COORDINATION OF CARE: ICD-10-CM

## 2023-01-19 PROCEDURE — 3008F PR BODY MASS INDEX (BMI) DOCUMENTED: ICD-10-PCS | Mod: CPTII,S$GLB,, | Performed by: NURSE PRACTITIONER

## 2023-01-19 PROCEDURE — 3074F SYST BP LT 130 MM HG: CPT | Mod: CPTII,S$GLB,, | Performed by: NURSE PRACTITIONER

## 2023-01-19 PROCEDURE — 3008F BODY MASS INDEX DOCD: CPT | Mod: CPTII,S$GLB,, | Performed by: NURSE PRACTITIONER

## 2023-01-19 PROCEDURE — 99214 OFFICE O/P EST MOD 30 MIN: CPT | Mod: S$GLB,,, | Performed by: NURSE PRACTITIONER

## 2023-01-19 PROCEDURE — 99999 PR PBB SHADOW E&M-EST. PATIENT-LVL IV: ICD-10-PCS | Mod: PBBFAC,,, | Performed by: NURSE PRACTITIONER

## 2023-01-19 PROCEDURE — 3078F DIAST BP <80 MM HG: CPT | Mod: CPTII,S$GLB,, | Performed by: NURSE PRACTITIONER

## 2023-01-19 PROCEDURE — 99214 PR OFFICE/OUTPT VISIT, EST, LEVL IV, 30-39 MIN: ICD-10-PCS | Mod: S$GLB,,, | Performed by: NURSE PRACTITIONER

## 2023-01-19 PROCEDURE — 3078F PR MOST RECENT DIASTOLIC BLOOD PRESSURE < 80 MM HG: ICD-10-PCS | Mod: CPTII,S$GLB,, | Performed by: NURSE PRACTITIONER

## 2023-01-19 PROCEDURE — 3044F HG A1C LEVEL LT 7.0%: CPT | Mod: CPTII,S$GLB,, | Performed by: NURSE PRACTITIONER

## 2023-01-19 PROCEDURE — 1160F PR REVIEW ALL MEDS BY PRESCRIBER/CLIN PHARMACIST DOCUMENTED: ICD-10-PCS | Mod: CPTII,S$GLB,, | Performed by: NURSE PRACTITIONER

## 2023-01-19 PROCEDURE — 3044F PR MOST RECENT HEMOGLOBIN A1C LEVEL <7.0%: ICD-10-PCS | Mod: CPTII,S$GLB,, | Performed by: NURSE PRACTITIONER

## 2023-01-19 PROCEDURE — 99999 PR PBB SHADOW E&M-EST. PATIENT-LVL IV: CPT | Mod: PBBFAC,,, | Performed by: NURSE PRACTITIONER

## 2023-01-19 PROCEDURE — 3074F PR MOST RECENT SYSTOLIC BLOOD PRESSURE < 130 MM HG: ICD-10-PCS | Mod: CPTII,S$GLB,, | Performed by: NURSE PRACTITIONER

## 2023-01-19 PROCEDURE — 1160F RVW MEDS BY RX/DR IN RCRD: CPT | Mod: CPTII,S$GLB,, | Performed by: NURSE PRACTITIONER

## 2023-01-19 PROCEDURE — 1159F MED LIST DOCD IN RCRD: CPT | Mod: CPTII,S$GLB,, | Performed by: NURSE PRACTITIONER

## 2023-01-19 PROCEDURE — 1159F PR MEDICATION LIST DOCUMENTED IN MEDICAL RECORD: ICD-10-PCS | Mod: CPTII,S$GLB,, | Performed by: NURSE PRACTITIONER

## 2023-01-19 NOTE — PROGRESS NOTES
Health Maintenance Due   Topic Date Due    Pneumococcal Vaccines (Age 0-64) (1 - PCV) Never done    Eye Exam  Never done    COVID-19 Vaccine (5 - Booster for Moderna series) 08/02/2022    Diabetes Urine Screening  12/06/2022    Mammogram  02/10/2023     Triggered LINKS. Updated Care Everywhere. Record request sent to [facility] on [address] asking for a copy of pt's most recent eye exam results. Chart review completed.

## 2023-01-19 NOTE — PATIENT INSTRUCTIONS
Follow up in 5 months w/ Irielle   A1c urine mac (1-7 days before appt)- June/July  Mammogram 2023    Lab Results   Component Value Date    HGBA1C 5.5 01/06/2023     Goal less than 6.5%    Check sugar levels 2 hours after meals periodically   5 mins before meal   Goal  sugar levels    [email or web address]  Eat fit gina  Myfitnesspal gina  [email or web address]

## 2023-01-19 NOTE — LETTER
AUTHORIZATION FOR RELEASE OF   CONFIDENTIAL INFORMATION    Dear [facility] ,    We are seeing Tete Liu, date of birth 1975, in the clinic at [facility]. Yosef Jane MD is the patient's PCP. Tete Liu has an outstanding lab/procedure at the time we reviewed her chart. In order to help keep her health information updated, she has authorized us to request the following medical record(s):        (  )  MAMMOGRAM                                      (  )  COLONOSCOPY      (  )  PAP SMEAR                                          (  )  OUTSIDE LAB RESULTS     (  )  DEXA SCAN                                          ( X )  EYE EXAM            (  )  FOOT EXAM                                          (  )  ENTIRE RECORD     (  )  OUTSIDE IMMUNIZATIONS                 (  )  _______________         Please fax records to Yosef Jane MD, [phone]     If you have any questions, please contact MAJOR Dale at [phone].           Patient Name: Tete Liu  : 1975  Patient Phone #: [phone]

## 2023-01-24 ENCOUNTER — TELEPHONE (OUTPATIENT)
Dept: INTERNAL MEDICINE | Facility: CLINIC | Age: 48
End: 2023-01-24
Payer: COMMERCIAL

## 2023-01-24 NOTE — TELEPHONE ENCOUNTER
----- Message from Cleveland Grigsby sent at 1/24/2023 12:15 PM CST -----  Regarding: patient would like to schedule a follow up in June with provider Brittany Donald  Type:  Sooner Appointment Request    Patient is requesting a sooner appointment.  Patient declined first available appointment listed as well as another facility and provider .  Patient will not accept being placed on the waitlist and is requesting a message be sent to doctor.    Name of Caller: Tete     When is the first available appointment? No appts were available in Epic     Symptoms: no symptoms/ follow up appt    Would the patient rather a call back or a response via [facility]? Call back     Best Call Back Number: [phone]    Additional Information: Patient is requesting a call an appt in June with provider Brittany Donald. She would like the following dates if possible, 6/1, 6/2, or 6/5. Please return call at earliest convenience.

## 2023-02-01 ENCOUNTER — PATIENT MESSAGE (OUTPATIENT)
Dept: INTERNAL MEDICINE | Facility: CLINIC | Age: 48
End: 2023-02-01
Payer: COMMERCIAL

## 2023-02-01 DIAGNOSIS — E11.65 TYPE 2 DIABETES MELLITUS WITH HYPERGLYCEMIA, WITHOUT LONG-TERM CURRENT USE OF INSULIN: ICD-10-CM

## 2023-02-02 ENCOUNTER — PATIENT MESSAGE (OUTPATIENT)
Dept: INTERNAL MEDICINE | Facility: CLINIC | Age: 48
End: 2023-02-02
Payer: COMMERCIAL

## 2023-02-02 ENCOUNTER — TELEPHONE (OUTPATIENT)
Dept: INTERNAL MEDICINE | Facility: CLINIC | Age: 48
End: 2023-02-02
Payer: COMMERCIAL

## 2023-02-02 RX ORDER — ATORVASTATIN CALCIUM 40 MG/1
40 TABLET, FILM COATED ORAL DAILY
Qty: 30 TABLET | Refills: 11 | OUTPATIENT
Start: 2023-02-02

## 2023-02-02 NOTE — TELEPHONE ENCOUNTER
Request for refills on Lipitor rec'd.   Would have been out of med as of 11/2022 if taken as prescribed by Dr. Jane.  Noting most recent Lipid panel in 2/2022 with exceptionally low levels, but given her history of DM, indicated.   Have called to discuss the need to check another fasting panel possibly at this time and to discuss if has been taking the lipid lowering agent as prescribed.   Will submit refills on behalf of Dr. Jane post discussing with patient.     ELIAZAR

## 2023-02-02 NOTE — TELEPHONE ENCOUNTER
Care Due:                  Date            Visit Type   Department     Provider  --------------------------------------------------------------------------------                                EP -                              PRIMARY      [facility] INTERNAL  Last Visit: 10-      CARE ([facility])   MEDICINE       Yosef Jane  Next Visit: None Scheduled  None         None Found                                                            Last  Test          Frequency    Reason                     Performed    Due Date  --------------------------------------------------------------------------------    Cr..........  12 months..  empagliflozin, metFORMIN.  02- 02-    [facility] Embedded Care Gaps. Reference number: [id number]. 2/02/2023   12:01:22 PM CST

## 2023-02-03 ENCOUNTER — PATIENT MESSAGE (OUTPATIENT)
Dept: INTERNAL MEDICINE | Facility: CLINIC | Age: 48
End: 2023-02-03
Payer: COMMERCIAL

## 2023-02-03 DIAGNOSIS — J45.20 ASTHMA IN ADULT, MILD INTERMITTENT, UNCOMPLICATED: ICD-10-CM

## 2023-02-03 RX ORDER — ALBUTEROL SULFATE 90 UG/1
2 AEROSOL, METERED RESPIRATORY (INHALATION) EVERY 4 HOURS PRN
Qty: 18 G | Refills: 2 | Status: SHIPPED | OUTPATIENT
Start: 2023-02-03 | End: 2023-05-15 | Stop reason: SDUPTHER

## 2023-02-03 NOTE — TELEPHONE ENCOUNTER
No new care gaps identified.  [facility] Embedded Care Gaps. Reference number: [id number]. 2/03/2023   7:39:39 AM CST

## 2023-02-15 DIAGNOSIS — E11.9 TYPE 2 DIABETES MELLITUS WITHOUT COMPLICATION: ICD-10-CM

## 2023-02-15 DIAGNOSIS — E11.65 TYPE 2 DIABETES MELLITUS WITH HYPERGLYCEMIA, WITHOUT LONG-TERM CURRENT USE OF INSULIN: ICD-10-CM

## 2023-02-20 ENCOUNTER — PATIENT MESSAGE (OUTPATIENT)
Dept: ADMINISTRATIVE | Facility: HOSPITAL | Age: 48
End: 2023-02-20
Payer: COMMERCIAL

## 2023-03-08 ENCOUNTER — HOSPITAL ENCOUNTER (OUTPATIENT)
Dept: RADIOLOGY | Facility: OTHER | Age: 48
Discharge: HOME | End: 2023-03-08
Attending: OBSTETRICS & GYNECOLOGY
Payer: COMMERCIAL

## 2023-03-08 DIAGNOSIS — Z12.31 BREAST CANCER SCREENING BY MAMMOGRAM: ICD-10-CM

## 2023-03-08 PROCEDURE — 77067 SCR MAMMO BI INCL CAD: CPT | Mod: 26,,, | Performed by: RADIOLOGY

## 2023-03-08 PROCEDURE — 77063 MAMMO DIGITAL SCREENING BILAT WITH TOMO: ICD-10-PCS | Mod: 26,,, | Performed by: RADIOLOGY

## 2023-03-08 PROCEDURE — 77067 MAMMO DIGITAL SCREENING BILAT WITH TOMO: ICD-10-PCS | Mod: 26,,, | Performed by: RADIOLOGY

## 2023-03-08 PROCEDURE — 77067 SCR MAMMO BI INCL CAD: CPT | Mod: TC

## 2023-03-08 PROCEDURE — 77063 BREAST TOMOSYNTHESIS BI: CPT | Mod: 26,,, | Performed by: RADIOLOGY

## 2023-04-03 ENCOUNTER — PATIENT MESSAGE (OUTPATIENT)
Dept: ADMINISTRATIVE | Facility: HOSPITAL | Age: 48
End: 2023-04-03
Payer: COMMERCIAL

## 2023-05-15 DIAGNOSIS — J45.20 ASTHMA IN ADULT, MILD INTERMITTENT, UNCOMPLICATED: ICD-10-CM

## 2023-05-15 RX ORDER — ALBUTEROL SULFATE 90 UG/1
2 AEROSOL, METERED RESPIRATORY (INHALATION) EVERY 4 HOURS PRN
Qty: 18 G | Refills: 1 | Status: SHIPPED | OUTPATIENT
Start: 2023-05-15 | End: 2023-09-28 | Stop reason: SDUPTHER

## 2023-05-15 NOTE — TELEPHONE ENCOUNTER
Refill Decision Note   Tete Noe  is requesting a refill authorization.  Brief Assessment and Rationale for Refill:  Approve     Medication Therapy Plan:       Medication Reconciliation Completed: No   Comments:     No Care Gaps recommended.     Note composed:9:19 AM 05/15/2023

## 2023-05-23 ENCOUNTER — OFFICE VISIT (OUTPATIENT)
Dept: INTERNAL MEDICINE | Facility: CLINIC | Age: 48
End: 2023-05-23
Payer: COMMERCIAL

## 2023-05-23 ENCOUNTER — PATIENT MESSAGE (OUTPATIENT)
Dept: INTERNAL MEDICINE | Facility: CLINIC | Age: 48
End: 2023-05-23

## 2023-05-23 ENCOUNTER — LAB VISIT (OUTPATIENT)
Dept: LAB | Facility: HOSPITAL | Age: 48
End: 2023-05-23
Attending: INTERNAL MEDICINE
Payer: COMMERCIAL

## 2023-05-23 VITALS
WEIGHT: 191.81 LBS | OXYGEN SATURATION: 97 % | HEIGHT: 65 IN | HEART RATE: 71 BPM | SYSTOLIC BLOOD PRESSURE: 112 MMHG | DIASTOLIC BLOOD PRESSURE: 70 MMHG | BODY MASS INDEX: 31.96 KG/M2 | RESPIRATION RATE: 18 BRPM

## 2023-05-23 DIAGNOSIS — E11.65 TYPE 2 DIABETES MELLITUS WITH HYPERGLYCEMIA, WITHOUT LONG-TERM CURRENT USE OF INSULIN: ICD-10-CM

## 2023-05-23 DIAGNOSIS — R00.2 PALPITATIONS: Primary | ICD-10-CM

## 2023-05-23 DIAGNOSIS — R00.2 PALPITATIONS: ICD-10-CM

## 2023-05-23 LAB
ALBUMIN SERPL BCP-MCNC: 4.1 G/DL (ref 3.5–5.2)
ALP SERPL-CCNC: 95 U/L (ref 55–135)
ALT SERPL W/O P-5'-P-CCNC: 21 U/L (ref 10–44)
ANION GAP SERPL CALC-SCNC: 7 MMOL/L (ref 8–16)
AST SERPL-CCNC: 17 U/L (ref 10–40)
BASOPHILS # BLD AUTO: 0.04 K/UL (ref 0–0.2)
BASOPHILS NFR BLD: 0.7 % (ref 0–1.9)
BILIRUB SERPL-MCNC: 0.4 MG/DL (ref 0.1–1)
BUN SERPL-MCNC: 15 MG/DL (ref 6–20)
CALCIUM SERPL-MCNC: 9.5 MG/DL (ref 8.7–10.5)
CHLORIDE SERPL-SCNC: 108 MMOL/L (ref 95–110)
CHOLEST SERPL-MCNC: 221 MG/DL (ref 120–199)
CHOLEST/HDLC SERPL: 5.5 {RATIO} (ref 2–5)
CO2 SERPL-SCNC: 25 MMOL/L (ref 23–29)
CREAT SERPL-MCNC: 0.7 MG/DL (ref 0.5–1.4)
DIFFERENTIAL METHOD: NORMAL
EOSINOPHIL # BLD AUTO: 0.2 K/UL (ref 0–0.5)
EOSINOPHIL NFR BLD: 3.9 % (ref 0–8)
ERYTHROCYTE [DISTWIDTH] IN BLOOD BY AUTOMATED COUNT: 12.7 % (ref 11.5–14.5)
EST. GFR  (NO RACE VARIABLE): >60 ML/MIN/1.73 M^2
ESTIMATED AVG GLUCOSE: 120 MG/DL (ref 68–131)
GLUCOSE SERPL-MCNC: 93 MG/DL (ref 70–110)
HBA1C MFR BLD: 5.8 % (ref 4–5.6)
HCT VFR BLD AUTO: 47.1 % (ref 37–48.5)
HDLC SERPL-MCNC: 40 MG/DL (ref 40–75)
HDLC SERPL: 18.1 % (ref 20–50)
HGB BLD-MCNC: 15.6 G/DL (ref 12–16)
IMM GRANULOCYTES # BLD AUTO: 0.01 K/UL (ref 0–0.04)
IMM GRANULOCYTES NFR BLD AUTO: 0.2 % (ref 0–0.5)
LDLC SERPL CALC-MCNC: 139 MG/DL (ref 63–159)
LYMPHOCYTES # BLD AUTO: 1.5 K/UL (ref 1–4.8)
LYMPHOCYTES NFR BLD: 28.2 % (ref 18–48)
MCH RBC QN AUTO: 29.2 PG (ref 27–31)
MCHC RBC AUTO-ENTMCNC: 33.1 G/DL (ref 32–36)
MCV RBC AUTO: 88 FL (ref 82–98)
MONOCYTES # BLD AUTO: 0.3 K/UL (ref 0.3–1)
MONOCYTES NFR BLD: 5.3 % (ref 4–15)
NEUTROPHILS # BLD AUTO: 3.4 K/UL (ref 1.8–7.7)
NEUTROPHILS NFR BLD: 61.7 % (ref 38–73)
NONHDLC SERPL-MCNC: 181 MG/DL
NRBC BLD-RTO: 0 /100 WBC
PLATELET # BLD AUTO: 182 K/UL (ref 150–450)
PMV BLD AUTO: 12.3 FL (ref 9.2–12.9)
POTASSIUM SERPL-SCNC: 4.5 MMOL/L (ref 3.5–5.1)
PROT SERPL-MCNC: 7.2 G/DL (ref 6–8.4)
RBC # BLD AUTO: 5.34 M/UL (ref 4–5.4)
SODIUM SERPL-SCNC: 140 MMOL/L (ref 136–145)
TRIGL SERPL-MCNC: 210 MG/DL (ref 30–150)
TSH SERPL DL<=0.005 MIU/L-ACNC: 0.63 UIU/ML (ref 0.4–4)
WBC # BLD AUTO: 5.43 K/UL (ref 3.9–12.7)

## 2023-05-23 PROCEDURE — 84443 ASSAY THYROID STIM HORMONE: CPT | Performed by: INTERNAL MEDICINE

## 2023-05-23 PROCEDURE — 36415 COLL VENOUS BLD VENIPUNCTURE: CPT | Performed by: INTERNAL MEDICINE

## 2023-05-23 PROCEDURE — 80053 COMPREHEN METABOLIC PANEL: CPT | Performed by: INTERNAL MEDICINE

## 2023-05-23 PROCEDURE — 99214 OFFICE O/P EST MOD 30 MIN: CPT | Mod: S$GLB,,, | Performed by: INTERNAL MEDICINE

## 2023-05-23 PROCEDURE — 83036 HEMOGLOBIN GLYCOSYLATED A1C: CPT | Performed by: INTERNAL MEDICINE

## 2023-05-23 PROCEDURE — 3008F BODY MASS INDEX DOCD: CPT | Mod: CPTII,S$GLB,, | Performed by: INTERNAL MEDICINE

## 2023-05-23 PROCEDURE — 93005 EKG 12-LEAD: ICD-10-PCS | Mod: S$GLB,,, | Performed by: INTERNAL MEDICINE

## 2023-05-23 PROCEDURE — 3078F DIAST BP <80 MM HG: CPT | Mod: CPTII,S$GLB,, | Performed by: INTERNAL MEDICINE

## 2023-05-23 PROCEDURE — 85025 COMPLETE CBC W/AUTO DIFF WBC: CPT | Performed by: INTERNAL MEDICINE

## 2023-05-23 PROCEDURE — 99999 PR PBB SHADOW E&M-EST. PATIENT-LVL IV: ICD-10-PCS | Mod: PBBFAC,,, | Performed by: INTERNAL MEDICINE

## 2023-05-23 PROCEDURE — 3008F PR BODY MASS INDEX (BMI) DOCUMENTED: ICD-10-PCS | Mod: CPTII,S$GLB,, | Performed by: INTERNAL MEDICINE

## 2023-05-23 PROCEDURE — 1160F PR REVIEW ALL MEDS BY PRESCRIBER/CLIN PHARMACIST DOCUMENTED: ICD-10-PCS | Mod: CPTII,S$GLB,, | Performed by: INTERNAL MEDICINE

## 2023-05-23 PROCEDURE — 93010 ELECTROCARDIOGRAM REPORT: CPT | Mod: S$GLB,,, | Performed by: INTERNAL MEDICINE

## 2023-05-23 PROCEDURE — 1159F PR MEDICATION LIST DOCUMENTED IN MEDICAL RECORD: ICD-10-PCS | Mod: CPTII,S$GLB,, | Performed by: INTERNAL MEDICINE

## 2023-05-23 PROCEDURE — 93010 EKG 12-LEAD: ICD-10-PCS | Mod: S$GLB,,, | Performed by: INTERNAL MEDICINE

## 2023-05-23 PROCEDURE — 3044F PR MOST RECENT HEMOGLOBIN A1C LEVEL <7.0%: ICD-10-PCS | Mod: CPTII,S$GLB,, | Performed by: INTERNAL MEDICINE

## 2023-05-23 PROCEDURE — 93005 ELECTROCARDIOGRAM TRACING: CPT | Mod: S$GLB,,, | Performed by: INTERNAL MEDICINE

## 2023-05-23 PROCEDURE — 99214 PR OFFICE/OUTPT VISIT, EST, LEVL IV, 30-39 MIN: ICD-10-PCS | Mod: S$GLB,,, | Performed by: INTERNAL MEDICINE

## 2023-05-23 PROCEDURE — 80061 LIPID PANEL: CPT | Performed by: INTERNAL MEDICINE

## 2023-05-23 PROCEDURE — 1160F RVW MEDS BY RX/DR IN RCRD: CPT | Mod: CPTII,S$GLB,, | Performed by: INTERNAL MEDICINE

## 2023-05-23 PROCEDURE — 3074F SYST BP LT 130 MM HG: CPT | Mod: CPTII,S$GLB,, | Performed by: INTERNAL MEDICINE

## 2023-05-23 PROCEDURE — 1159F MED LIST DOCD IN RCRD: CPT | Mod: CPTII,S$GLB,, | Performed by: INTERNAL MEDICINE

## 2023-05-23 PROCEDURE — 99999 PR PBB SHADOW E&M-EST. PATIENT-LVL IV: CPT | Mod: PBBFAC,,, | Performed by: INTERNAL MEDICINE

## 2023-05-23 PROCEDURE — 3074F PR MOST RECENT SYSTOLIC BLOOD PRESSURE < 130 MM HG: ICD-10-PCS | Mod: CPTII,S$GLB,, | Performed by: INTERNAL MEDICINE

## 2023-05-23 PROCEDURE — 3044F HG A1C LEVEL LT 7.0%: CPT | Mod: CPTII,S$GLB,, | Performed by: INTERNAL MEDICINE

## 2023-05-23 PROCEDURE — 3078F PR MOST RECENT DIASTOLIC BLOOD PRESSURE < 80 MM HG: ICD-10-PCS | Mod: CPTII,S$GLB,, | Performed by: INTERNAL MEDICINE

## 2023-05-23 NOTE — PROGRESS NOTES
Subjective:       Patient ID: Tete Liu is a 47 y.o. female.    Chief Complaint: Palpitations    Patient is here for followup for chronic conditions.    Some more sugar spikes off dm med.  Has also stopped lipitor, since could not get it RFed.    Feels heart racing, especially at noc and in middle of noc. She is very worried. Often happens when laying down. symptoms are not exertional (she has been exercising tiw). Never had episodes previously. Will last 10-20 minutes each episode. She does drink much caffeine, but some green tea. Min etoh. No drugs.    Recent work trip and did not have any symptoms.    Review of Systems   Constitutional:  Negative for activity change, appetite change and unexpected weight change.   Eyes:  Negative for visual disturbance.   Respiratory:  Negative for cough, chest tightness and shortness of breath.    Cardiovascular:  Positive for palpitations. Negative for chest pain and leg swelling.   Gastrointestinal:  Negative for abdominal distention and abdominal pain.   Genitourinary:  Negative for difficulty urinating and urgency.        No breast lumps/masses/pain/nipple d/c in either breast     Musculoskeletal:  Positive for arthralgias (R shoulder improved rom).   Skin:  Negative for rash.         Objective:      Physical Exam  Vitals reviewed.   Constitutional:       General: She is not in acute distress.     Appearance: Normal appearance. She is well-developed. She is not ill-appearing, toxic-appearing or diaphoretic.   HENT:      Head: Normocephalic and atraumatic.   Eyes:      General: No scleral icterus.     Pupils: Pupils are equal, round, and reactive to light.   Neck:      Thyroid: No thyromegaly.   Cardiovascular:      Rate and Rhythm: Normal rate and regular rhythm.      Heart sounds: Normal heart sounds. No murmur heard.    No friction rub. No gallop.   Pulmonary:      Effort: Pulmonary effort is normal. No respiratory distress.      Breath sounds: Normal breath sounds. No  wheezing or rales.   Abdominal:      General: Bowel sounds are normal. There is no distension.      Palpations: Abdomen is soft. There is no mass.      Tenderness: There is no abdominal tenderness. There is no guarding or rebound.   Musculoskeletal:         General: No tenderness. Normal range of motion.      Cervical back: Normal range of motion.   Lymphadenopathy:      Cervical: No cervical adenopathy.   Neurological:      General: No focal deficit present.      Mental Status: She is alert and oriented to person, place, and time.   Psychiatric:         Mood and Affect: Mood normal.         Speech: Speech normal.         Behavior: Behavior normal.       Assessment:       1. Palpitations    2. Type 2 diabetes mellitus with hyperglycemia, without long-term current use of insulin        Plan:       Tete was seen today for palpitations.    Diagnoses and all orders for this visit:    Palpitations  -     IN OFFICE EKG 12-LEAD (to Muse)  -     TSH; Future  symptoms sound more like anxiety than actual cardiac origin, this was discussed with patient. I offered 48 hr holter and we agreed to hold for now and see ekg and blood work results. She will call if symptoms worsen. Especially if any symptoms of syncope or pre-syncope arise.    Type 2 diabetes mellitus with hyperglycemia, without long-term current use of insulin  -     CBC Auto Differential; Future  -     Comprehensive Metabolic Panel; Future  -     Lipid Panel; Future  -     Hemoglobin A1C; Future        Health Maintenance         Date Due Completion Date    Pneumococcal Vaccines (Age 0-64) (1 - PCV) Never done ---    Eye Exam Never done ---    COVID-19 Vaccine (5 - Booster for Moderna series) 08/02/2022 6/7/2022    Diabetes Urine Screening 12/06/2022 12/6/2021    Lipid Panel 02/08/2023 2/8/2022    Hemoglobin A1c 07/06/2023 1/6/2023    Low Dose Statin 01/19/2024 1/19/2023    Foot Exam 01/19/2024 1/19/2023    Override on 12/6/2021: Done    Mammogram 03/08/2024 3/8/2023     Cervical Cancer Screening 12/19/2025 12/19/2022    TETANUS VACCINE 09/26/2026 9/26/2016    Colorectal Cancer Screening 10/06/2030 10/6/2020            Follow up in about 6 months (around 11/23/2023).    Future Appointments   Date Time Provider Department Center   11/27/2023  7:00 AM LAB, APPOINTMENT [facility]   12/5/2023  8:20 AM Yosef Jane MD [facility]

## 2023-05-24 RX ORDER — ATORVASTATIN CALCIUM 40 MG/1
40 TABLET, FILM COATED ORAL DAILY
Qty: 90 TABLET | Refills: 11 | Status: SHIPPED | OUTPATIENT
Start: 2023-05-24

## 2023-05-29 ENCOUNTER — PATIENT MESSAGE (OUTPATIENT)
Dept: INTERNAL MEDICINE | Facility: CLINIC | Age: 48
End: 2023-05-29
Payer: COMMERCIAL

## 2023-06-29 ENCOUNTER — PATIENT MESSAGE (OUTPATIENT)
Dept: INTERNAL MEDICINE | Facility: CLINIC | Age: 48
End: 2023-06-29
Payer: COMMERCIAL

## 2023-06-30 RX ORDER — METFORMIN HYDROCHLORIDE 500 MG/1
1000 TABLET, EXTENDED RELEASE ORAL 2 TIMES DAILY
COMMUNITY
Start: 2023-06-27 | End: 2023-11-08

## 2023-06-30 RX ORDER — SEMAGLUTIDE 1.34 MG/ML
1 INJECTION, SOLUTION SUBCUTANEOUS
Qty: 1 EACH | Refills: 6 | Status: SHIPPED | OUTPATIENT
Start: 2023-06-30 | End: 2024-02-29 | Stop reason: SDUPTHER

## 2023-06-30 RX ORDER — ATORVASTATIN CALCIUM 40 MG/1
1 TABLET, FILM COATED ORAL DAILY
COMMUNITY
End: 2023-06-30 | Stop reason: SDUPTHER

## 2023-09-15 ENCOUNTER — PATIENT MESSAGE (OUTPATIENT)
Dept: ADMINISTRATIVE | Facility: HOSPITAL | Age: 48
End: 2023-09-15
Payer: COMMERCIAL

## 2023-09-15 DIAGNOSIS — E11.65 TYPE 2 DIABETES MELLITUS WITH HYPERGLYCEMIA, WITHOUT LONG-TERM CURRENT USE OF INSULIN: Primary | ICD-10-CM

## 2023-09-28 DIAGNOSIS — J45.20 ASTHMA IN ADULT, MILD INTERMITTENT, UNCOMPLICATED: ICD-10-CM

## 2023-09-28 RX ORDER — ALBUTEROL SULFATE 90 UG/1
2 AEROSOL, METERED RESPIRATORY (INHALATION) EVERY 4 HOURS PRN
Qty: 54 G | Refills: 2 | Status: SHIPPED | OUTPATIENT
Start: 2023-09-28

## 2023-09-28 NOTE — TELEPHONE ENCOUNTER
Refill Decision Note   Tete Liu  is requesting a refill authorization.  Brief Assessment and Rationale for Refill:  Approve     Medication Therapy Plan:  FLOS      Comments:     Note composed:11:24 AM 09/28/2023             Appointments     Last Visit   5/23/2023 Yosef Jane MD   Next Visit   12/5/2023 Yosef Jane MD

## 2023-09-28 NOTE — TELEPHONE ENCOUNTER
Care Due:                  Date            Visit Type   Department     Provider  --------------------------------------------------------------------------------                                MYCHART                              FOLLOWUP/OF  [facility] INTERNAL  Last Visit: 05-      FICE VISIT   MEDICINE       Yosef Jane                              EP -                              PRIMARY      [facility] INTERNAL  Next Visit: 12-      CARE ([facility])   MEDICINE       Yosef Jane                                                            Last  Test          Frequency    Reason                     Performed    Due Date  --------------------------------------------------------------------------------    HBA1C.......  6 months...  empagliflozin............  05- 11-    Health [facility] Embedded Care Due Messages. Reference number: [id number].   9/28/2023 10:56:52 AM CDT

## 2023-11-08 ENCOUNTER — TELEPHONE (OUTPATIENT)
Dept: PHARMACY | Facility: CLINIC | Age: 48
End: 2023-11-08
Payer: COMMERCIAL

## 2023-11-08 NOTE — TELEPHONE ENCOUNTER
Assessed patient medication adherence for population health /[facility] medication adherence project

## 2023-11-14 ENCOUNTER — TELEPHONE (OUTPATIENT)
Dept: INTERNAL MEDICINE | Facility: CLINIC | Age: 48
End: 2023-11-14
Payer: COMMERCIAL

## 2023-11-14 NOTE — TELEPHONE ENCOUNTER
----- Message from Kaci London sent at 11/14/2023  8:10 AM CST -----  Contact: Tete/Portal  Caller is requesting an earlier appointment then we can schedule.  Caller is requesting a message be sent to the provider.  If this is for urgent care symptoms, did you offer other providers at this location, providers at other locations, or [facility]? (yes, no, n/a):  n/a  If this is for the patients physical, did you offer to schedule next available and put on wait list, or to see NP or PA for their physical?  (yes, no, n/a):  n/a  When is the next available appointment with their provider:  booked out  Reason for the appointment: diabetes f/u  Patient preference of timeframe to be scheduled:  after 12/5   Would the patient like a call back, or a response through their [facility] portal?:   portal  Comments:

## 2023-11-27 ENCOUNTER — LAB VISIT (OUTPATIENT)
Dept: LAB | Facility: HOSPITAL | Age: 48
End: 2023-11-27
Payer: COMMERCIAL

## 2023-11-27 DIAGNOSIS — E11.65 TYPE 2 DIABETES MELLITUS WITH HYPERGLYCEMIA, WITHOUT LONG-TERM CURRENT USE OF INSULIN: ICD-10-CM

## 2023-11-27 LAB
ANION GAP SERPL CALC-SCNC: 6 MMOL/L (ref 8–16)
BUN SERPL-MCNC: 15 MG/DL (ref 6–20)
CALCIUM SERPL-MCNC: 9.4 MG/DL (ref 8.7–10.5)
CHLORIDE SERPL-SCNC: 107 MMOL/L (ref 95–110)
CHOLEST SERPL-MCNC: 133 MG/DL (ref 120–199)
CHOLEST/HDLC SERPL: 3 {RATIO} (ref 2–5)
CO2 SERPL-SCNC: 28 MMOL/L (ref 23–29)
CREAT SERPL-MCNC: 0.7 MG/DL (ref 0.5–1.4)
EST. GFR  (NO RACE VARIABLE): >60 ML/MIN/1.73 M^2
ESTIMATED AVG GLUCOSE: 117 MG/DL (ref 68–131)
GLUCOSE SERPL-MCNC: 93 MG/DL (ref 70–110)
HBA1C MFR BLD: 5.7 % (ref 4–5.6)
HDLC SERPL-MCNC: 44 MG/DL (ref 40–75)
HDLC SERPL: 33.1 % (ref 20–50)
LDLC SERPL CALC-MCNC: 64.4 MG/DL (ref 63–159)
NONHDLC SERPL-MCNC: 89 MG/DL
POTASSIUM SERPL-SCNC: 4.1 MMOL/L (ref 3.5–5.1)
SODIUM SERPL-SCNC: 141 MMOL/L (ref 136–145)
TRIGL SERPL-MCNC: 123 MG/DL (ref 30–150)

## 2023-11-27 PROCEDURE — 80048 BASIC METABOLIC PNL TOTAL CA: CPT | Performed by: INTERNAL MEDICINE

## 2023-11-27 PROCEDURE — 36415 COLL VENOUS BLD VENIPUNCTURE: CPT | Performed by: INTERNAL MEDICINE

## 2023-11-27 PROCEDURE — 83036 HEMOGLOBIN GLYCOSYLATED A1C: CPT | Performed by: INTERNAL MEDICINE

## 2023-11-27 PROCEDURE — 80061 LIPID PANEL: CPT | Performed by: INTERNAL MEDICINE

## 2023-12-05 ENCOUNTER — OFFICE VISIT (OUTPATIENT)
Dept: INTERNAL MEDICINE | Facility: CLINIC | Age: 48
End: 2023-12-05
Payer: COMMERCIAL

## 2023-12-05 ENCOUNTER — LAB VISIT (OUTPATIENT)
Dept: LAB | Facility: HOSPITAL | Age: 48
End: 2023-12-05
Payer: COMMERCIAL

## 2023-12-05 ENCOUNTER — IMMUNIZATION (OUTPATIENT)
Dept: INTERNAL MEDICINE | Facility: CLINIC | Age: 48
End: 2023-12-05
Payer: COMMERCIAL

## 2023-12-05 VITALS — WEIGHT: 197.75 LBS | DIASTOLIC BLOOD PRESSURE: 70 MMHG | BODY MASS INDEX: 32.91 KG/M2 | SYSTOLIC BLOOD PRESSURE: 90 MMHG

## 2023-12-05 DIAGNOSIS — Z00.00 ROUTINE GENERAL MEDICAL EXAMINATION AT A HEALTH CARE FACILITY: Primary | ICD-10-CM

## 2023-12-05 DIAGNOSIS — Z23 NEED FOR VACCINATION: Primary | ICD-10-CM

## 2023-12-05 DIAGNOSIS — E11.65 TYPE 2 DIABETES MELLITUS WITH HYPERGLYCEMIA, WITHOUT LONG-TERM CURRENT USE OF INSULIN: ICD-10-CM

## 2023-12-05 DIAGNOSIS — E66.9 OBESITY (BMI 30-39.9): ICD-10-CM

## 2023-12-05 LAB
ALBUMIN/CREAT UR: 8.7 UG/MG (ref 0–30)
CREAT UR-MCNC: 92 MG/DL (ref 15–325)
MICROALBUMIN UR DL<=1MG/L-MCNC: 8 UG/ML

## 2023-12-05 PROCEDURE — 90471 IMMUNIZATION ADMIN: CPT | Mod: S$GLB,,, | Performed by: INTERNAL MEDICINE

## 2023-12-05 PROCEDURE — 99999 PR PBB SHADOW E&M-EST. PATIENT-LVL II: ICD-10-PCS | Mod: PBBFAC,,, | Performed by: INTERNAL MEDICINE

## 2023-12-05 PROCEDURE — 99396 PREV VISIT EST AGE 40-64: CPT | Mod: 25,S$GLB,, | Performed by: INTERNAL MEDICINE

## 2023-12-05 PROCEDURE — 90686 FLU VACCINE (QUAD) GREATER THAN OR EQUAL TO 3YO PRESERVATIVE FREE IM: ICD-10-PCS | Mod: S$GLB,,, | Performed by: INTERNAL MEDICINE

## 2023-12-05 PROCEDURE — 3008F PR BODY MASS INDEX (BMI) DOCUMENTED: ICD-10-PCS | Mod: CPTII,S$GLB,, | Performed by: INTERNAL MEDICINE

## 2023-12-05 PROCEDURE — 3074F PR MOST RECENT SYSTOLIC BLOOD PRESSURE < 130 MM HG: ICD-10-PCS | Mod: CPTII,S$GLB,, | Performed by: INTERNAL MEDICINE

## 2023-12-05 PROCEDURE — 3044F HG A1C LEVEL LT 7.0%: CPT | Mod: CPTII,S$GLB,, | Performed by: INTERNAL MEDICINE

## 2023-12-05 PROCEDURE — 1159F MED LIST DOCD IN RCRD: CPT | Mod: CPTII,S$GLB,, | Performed by: INTERNAL MEDICINE

## 2023-12-05 PROCEDURE — 3044F PR MOST RECENT HEMOGLOBIN A1C LEVEL <7.0%: ICD-10-PCS | Mod: CPTII,S$GLB,, | Performed by: INTERNAL MEDICINE

## 2023-12-05 PROCEDURE — 3078F PR MOST RECENT DIASTOLIC BLOOD PRESSURE < 80 MM HG: ICD-10-PCS | Mod: CPTII,S$GLB,, | Performed by: INTERNAL MEDICINE

## 2023-12-05 PROCEDURE — 82043 UR ALBUMIN QUANTITATIVE: CPT | Performed by: NURSE PRACTITIONER

## 2023-12-05 PROCEDURE — 99396 PR PREVENTIVE VISIT,EST,40-64: ICD-10-PCS | Mod: 25,S$GLB,, | Performed by: INTERNAL MEDICINE

## 2023-12-05 PROCEDURE — 90686 IIV4 VACC NO PRSV 0.5 ML IM: CPT | Mod: S$GLB,,, | Performed by: INTERNAL MEDICINE

## 2023-12-05 PROCEDURE — 99999 PR PBB SHADOW E&M-EST. PATIENT-LVL II: CPT | Mod: PBBFAC,,, | Performed by: INTERNAL MEDICINE

## 2023-12-05 PROCEDURE — 90471 FLU VACCINE (QUAD) GREATER THAN OR EQUAL TO 3YO PRESERVATIVE FREE IM: ICD-10-PCS | Mod: S$GLB,,, | Performed by: INTERNAL MEDICINE

## 2023-12-05 PROCEDURE — 1160F RVW MEDS BY RX/DR IN RCRD: CPT | Mod: CPTII,S$GLB,, | Performed by: INTERNAL MEDICINE

## 2023-12-05 PROCEDURE — 3008F BODY MASS INDEX DOCD: CPT | Mod: CPTII,S$GLB,, | Performed by: INTERNAL MEDICINE

## 2023-12-05 PROCEDURE — 1159F PR MEDICATION LIST DOCUMENTED IN MEDICAL RECORD: ICD-10-PCS | Mod: CPTII,S$GLB,, | Performed by: INTERNAL MEDICINE

## 2023-12-05 PROCEDURE — 3074F SYST BP LT 130 MM HG: CPT | Mod: CPTII,S$GLB,, | Performed by: INTERNAL MEDICINE

## 2023-12-05 PROCEDURE — 1160F PR REVIEW ALL MEDS BY PRESCRIBER/CLIN PHARMACIST DOCUMENTED: ICD-10-PCS | Mod: CPTII,S$GLB,, | Performed by: INTERNAL MEDICINE

## 2023-12-05 PROCEDURE — 3078F DIAST BP <80 MM HG: CPT | Mod: CPTII,S$GLB,, | Performed by: INTERNAL MEDICINE

## 2023-12-05 NOTE — PROGRESS NOTES
Subjective:       Patient ID: Tete Liu is a 48 y.o. female.    Chief Complaint: No chief complaint on file.    Patient is here for followup for chronic conditions.    Has been down feeling lately.    Had been Taking burburin instead of metformin and then restarted last week when saw rupert hemoglobin A1c.    Lab Results       Component                Value               Date                       HGBA1C                   5.7 (H)             11/27/2023                    Review of Systems   Constitutional:  Negative for activity change, appetite change and unexpected weight change.   Eyes:  Negative for visual disturbance.   Respiratory:  Negative for cough, chest tightness and shortness of breath.    Cardiovascular:  Positive for palpitations (with anxiety). Negative for chest pain and leg swelling.   Gastrointestinal:  Negative for abdominal distention and abdominal pain.   Genitourinary:  Negative for difficulty urinating and urgency.        No breast lumps/masses/pain/nipple d/c in either breast     Musculoskeletal:  Negative for arthralgias.   Skin:  Negative for rash.   Psychiatric/Behavioral:  Positive for dysphoric mood.            Objective:      Physical Exam  Vitals reviewed.   Constitutional:       General: She is not in acute distress.     Appearance: Normal appearance. She is well-developed. She is not ill-appearing, toxic-appearing or diaphoretic.   HENT:      Head: Normocephalic and atraumatic.   Eyes:      General: No scleral icterus.     Pupils: Pupils are equal, round, and reactive to light.   Neck:      Thyroid: No thyromegaly.   Cardiovascular:      Rate and Rhythm: Normal rate and regular rhythm.      Heart sounds: Normal heart sounds. No murmur heard.     No friction rub. No gallop.   Pulmonary:      Effort: Pulmonary effort is normal. No respiratory distress.      Breath sounds: Normal breath sounds. No wheezing or rales.   Abdominal:      General: Bowel sounds are normal. There is no  distension.      Palpations: Abdomen is soft. There is no mass.      Tenderness: There is no abdominal tenderness. There is no guarding or rebound.   Musculoskeletal:         General: No tenderness. Normal range of motion.      Cervical back: Normal range of motion.      Comments: Protective Sensation (w/ 10 gram monofilament):  Right: Intact  Left: Intact    Visual Inspection:  Normal -  Bilateral    Pedal Pulses:   Right: Present  Left: Present    Posterior Tibialis Pulses:   Right:Present  Left: Present       Lymphadenopathy:      Cervical: No cervical adenopathy.   Neurological:      General: No focal deficit present.      Mental Status: She is alert and oriented to person, place, and time.   Psychiatric:         Mood and Affect: Mood normal.         Speech: Speech normal.         Behavior: Behavior normal.         Assessment:       1. Routine general medical examination at a health care facility    2. Type 2 diabetes mellitus with hyperglycemia, without long-term current use of insulin    3. Obesity (BMI 30-39.9)        Plan:       Diagnoses and all orders for this visit:    Routine general medical examination at a health care facility  Flu vax please      Type 2 diabetes mellitus with hyperglycemia, without long-term current use of insulin  -     Microalbumin/Creatinine Ratio, Urine  Dm well controlled even off metformin, I advised she can maybe even come off jardiance, she will discuss further with Ms Odilon  Reviewed lipids off statin and I advised she stick with statin    Obesity (BMI 30-39.9)  She will work on getting back to exercise    Adjustment/depressed mood -- discussed seeing a psychotherapist, she will consider.  She will also see gyn and check in whether some symptoms could be menopause related  Explained that if not better in 1-2 mos, pt should rtc/call PCP      Health Maintenance         Date Due Completion Date    Pneumococcal Vaccines (Age 0-64) (1 - PCV) Never done ---    Eye Exam Never done  ---    Diabetes Urine Screening 12/06/2022 12/6/2021    Influenza Vaccine (1) 09/01/2023 12/16/2022    COVID-19 Vaccine (5 - 2023-24 season) 09/01/2023 6/7/2022    Foot Exam 01/19/2024 1/19/2023    Override on 12/6/2021: Done    Mammogram 03/08/2024 3/8/2023    Low Dose Statin 05/24/2024 5/24/2023    Hemoglobin A1c 05/27/2024 11/27/2023    Lipid Panel 11/27/2024 11/27/2023    Cervical Cancer Screening 12/19/2025 12/19/2022    TETANUS VACCINE 09/26/2026 9/26/2016    Colorectal Cancer Screening 10/06/2030 10/6/2020   Covid vaccine 1 wk         Follow up in about 1 year (around 12/5/2024).    Future Appointments   Date Time Provider Department Center   12/8/2023  2:30 PM Brittany Donald, APRN, FNP [facility]  [facility]

## 2023-12-06 ENCOUNTER — PATIENT MESSAGE (OUTPATIENT)
Dept: INTERNAL MEDICINE | Facility: CLINIC | Age: 48
End: 2023-12-06
Payer: COMMERCIAL

## 2023-12-07 NOTE — PROGRESS NOTES
CHIEF COMPLAINT: Type 2 Diabetes     HPI: Mrs. Tete Liu is a 48 y.o. female who was diagnosed with Type 2 DM in 11/2/2021. >14% to 6.3%  Past Medical History:   Diagnosis Date    Allergy     Asthma     adult onset    Type 2 diabetes mellitus     diagnosed 11/2021       New onset x 10 years ago.  Stressors: bad news/life difficulties, not as motivated  Gain 12# in the past 6 months.   In the past, on metformin , added berberine.   Saw pcp in the past week.   Off metformin, questions about berberine.   Lab Results   Component Value Date    HGBA1C 5.7 (H) 11/27/2023     Off lantus x > 15 months ago  Continues w/ ozempic , jardiance 10 mg     Goal to wean off antihyperglycemic agents  Traveling to [place] in June 2023.  C/o bilateral tingling in hands, cold feet over the past 4 months     Tracking BG 1x a day     Dietary habits:  Working on carb intake  Ground turkey   Chicken  No bread  Arsalan   charli seeds  Fruits - berries, apples  See above      Exercise: 5000 steps intentional   Works from desk   Walks dog      PREVIOUS DIABETES MEDICATIONS TRIED  lantus   Metformin   ozempic  Berberine    CURRENT DIABETIC MEDS:  ozempic 1 mg weekly, jardiance 10 mg daily     Diabetes Management Status    Statin: Taking  ACE/ARB: Not taking    Screening or Prevention Patient's value Goal Complete/Controlled?   HgA1C Testing and Control   Lab Results   Component Value Date    HGBA1C 5.7 (H) 11/27/2023      Annually/Less than 8% Yes   Lipid profile : 11/27/2023 Annually Yes   LDL control Lab Results   Component Value Date    LDLCALC 64.4 11/27/2023    Annually/Less than 100 mg/dl  Yes   Nephropathy screening Lab Results   Component Value Date    LABMICR 8.0 12/05/2023     Lab Results   Component Value Date    PROTEINUA Trace (A) 07/06/2017    Annually No   Blood pressure BP Readings from Last 1 Encounters:   12/05/23 90/70    Less than 140/90 Yes   Dilated retinal exam Most Recent Eye Exam Date: Not Found Annually Yes   Foot exam    ": 12/05/2023 Annually Yes     REVIEW OF SYSTEMS  General: no weakness, fatigue, +weight gain 12#   Eyes: no double or blurred vision, eye pain, or redness  Cardiovascular: no chest pain, palpitations, edema, or murmurs.   Respiratory: no cough or dyspnea. +h/o asthma  GI: no heartburn, nausea, or changes in bowel patterns; good appetite.  Skin: no rashes, dryness, itching, or reactions at insulin injection sites.  Neuro: no numbness, tingling, tremors, or vertigo.  Endocrine: no polyuria, polydipsia, polyphagia, heat or cold intolerance. '  +feeling overwhelmed     Vital Signs  /82 (BP Location: Left arm, Patient Position: Sitting, BP Method: Medium (Manual))   Pulse 70   Ht 5' 5" (1.651 m)   Wt 90.2 kg (198 lb 13.7 oz)   LMP 06/20/2021 (Approximate)   SpO2 95%   BMI 33.09 kg/m²     Hemoglobin A1C   Date Value Ref Range Status   11/27/2023 5.7 (H) 4.0 - 5.6 % Final     Comment:     ADA Screening Guidelines:  5.7-6.4%  Consistent with prediabetes  >or=6.5%  Consistent with diabetes    High levels of fetal hemoglobin interfere with the HbA1C  assay. Heterozygous hemoglobin variants (HbS, HgC, etc)do  not significantly interfere with this assay.   However, presence of multiple variants may affect accuracy.     05/23/2023 5.8 (H) 4.0 - 5.6 % Final     Comment:     ADA Screening Guidelines:  5.7-6.4%  Consistent with prediabetes  >or=6.5%  Consistent with diabetes    High levels of fetal hemoglobin interfere with the HbA1C  assay. Heterozygous hemoglobin variants (HbS, HgC, etc)do  not significantly interfere with this assay.   However, presence of multiple variants may affect accuracy.     01/06/2023 5.5 4.0 - 5.6 % Final     Comment:     ADA Screening Guidelines:  5.7-6.4%  Consistent with prediabetes  >or=6.5%  Consistent with diabetes    High levels of fetal hemoglobin interfere with the HbA1C  assay. Heterozygous hemoglobin variants (HbS, HgC, etc)do  not significantly interfere with this assay. "   However, presence of multiple variants may affect accuracy.          Chemistry        Component Value Date/Time     11/27/2023 0753    K 4.1 11/27/2023 0753     11/27/2023 0753    CO2 28 11/27/2023 0753    BUN 15 11/27/2023 0753    CREATININE 0.7 11/27/2023 0753    GLU 93 11/27/2023 0753        Component Value Date/Time    CALCIUM 9.4 11/27/2023 0753    ALKPHOS 95 05/23/2023 0905    AST 17 05/23/2023 0905    ALT 21 05/23/2023 0905    BILITOT 0.4 05/23/2023 0905    ESTGFRAFRICA >60.0 02/08/2022 0753    EGFRNONAA >60.0 02/08/2022 0753           Lab Results   Component Value Date    TSH 0.629 05/23/2023      Lab Results   Component Value Date    CHOL 133 11/27/2023    CHOL 221 (H) 05/23/2023    CHOL 113 (L) 02/08/2022     Lab Results   Component Value Date    HDL 44 11/27/2023    HDL 40 05/23/2023    HDL 37 (L) 02/08/2022     Lab Results   Component Value Date    LDLCALC 64.4 11/27/2023    LDLCALC 139.0 05/23/2023    LDLCALC 56.6 (L) 02/08/2022     Lab Results   Component Value Date    TRIG 123 11/27/2023    TRIG 210 (H) 05/23/2023    TRIG 97 02/08/2022     Lab Results   Component Value Date    CHOLHDL 33.1 11/27/2023    CHOLHDL 18.1 (L) 05/23/2023    CHOLHDL 32.7 02/08/2022         PHYSICAL EXAMINATION  Constitutional: Appears well, no distress Reviewed vitals above.  Eyes: conjunctivae & lids intact; PERRLA, EOMs intact; optic discs   Neck: Supple, trachea midline.   Respiratory: CTA without wheezes, even and unlabored  Cardiovascular: RRR;no edema or varicosities  Lymph: deferred   Skin: warm and dry; no injection site reactions  Neuro:patient alert and cooperative, normal affect; steady gait.  Psychiatric: judgement & insight intact, orientation of time, place & person intact, memory; mood & affect wnl     Diabetes Foot Exam: deferred   Assessment/Plan    1. Type 2 diabetes mellitus with hyperglycemia, without long-term current use of insulin  Hemoglobin A1C    Ambulatory referral/consult to Diabetes  Education      2. Obesity (BMI 30-39.9)        3. Hyperlipidemia, unspecified hyperlipidemia type        4. Counseling and coordination of care        5. Stress and adjustment reaction  Ambulatory referral/consult to Primary Care Behavioral Health (Non-Opioids)      1-5. Follow up in 5 months w/ me   A1c prior -5 months   DE for nutrition   Behavior health- coping mechanisms   Lab Results   Component Value Date    LDLCALC 64.4 11/27/2023     At goal   A1c goal less than 6.5%       FOLLOW UP  In 5 months

## 2023-12-08 ENCOUNTER — TELEPHONE (OUTPATIENT)
Dept: DIABETES | Facility: CLINIC | Age: 48
End: 2023-12-08
Payer: COMMERCIAL

## 2023-12-08 ENCOUNTER — OFFICE VISIT (OUTPATIENT)
Dept: INTERNAL MEDICINE | Facility: CLINIC | Age: 48
End: 2023-12-08
Payer: COMMERCIAL

## 2023-12-08 ENCOUNTER — PATIENT MESSAGE (OUTPATIENT)
Dept: DIABETES | Facility: CLINIC | Age: 48
End: 2023-12-08
Payer: COMMERCIAL

## 2023-12-08 ENCOUNTER — PATIENT MESSAGE (OUTPATIENT)
Dept: BEHAVIORAL HEALTH | Facility: CLINIC | Age: 48
End: 2023-12-08
Payer: COMMERCIAL

## 2023-12-08 VITALS
BODY MASS INDEX: 33.13 KG/M2 | OXYGEN SATURATION: 95 % | WEIGHT: 198.88 LBS | HEIGHT: 65 IN | DIASTOLIC BLOOD PRESSURE: 82 MMHG | SYSTOLIC BLOOD PRESSURE: 110 MMHG | HEART RATE: 70 BPM

## 2023-12-08 DIAGNOSIS — E78.5 HYPERLIPIDEMIA, UNSPECIFIED HYPERLIPIDEMIA TYPE: ICD-10-CM

## 2023-12-08 DIAGNOSIS — Z71.89 COUNSELING AND COORDINATION OF CARE: ICD-10-CM

## 2023-12-08 DIAGNOSIS — F43.29 STRESS AND ADJUSTMENT REACTION: ICD-10-CM

## 2023-12-08 DIAGNOSIS — E66.9 OBESITY (BMI 30-39.9): ICD-10-CM

## 2023-12-08 DIAGNOSIS — E11.65 TYPE 2 DIABETES MELLITUS WITH HYPERGLYCEMIA, WITHOUT LONG-TERM CURRENT USE OF INSULIN: Primary | ICD-10-CM

## 2023-12-08 PROCEDURE — 3079F PR MOST RECENT DIASTOLIC BLOOD PRESSURE 80-89 MM HG: ICD-10-PCS | Mod: CPTII,S$GLB,, | Performed by: NURSE PRACTITIONER

## 2023-12-08 PROCEDURE — 3079F DIAST BP 80-89 MM HG: CPT | Mod: CPTII,S$GLB,, | Performed by: NURSE PRACTITIONER

## 2023-12-08 PROCEDURE — 1159F MED LIST DOCD IN RCRD: CPT | Mod: CPTII,S$GLB,, | Performed by: NURSE PRACTITIONER

## 2023-12-08 PROCEDURE — 99214 OFFICE O/P EST MOD 30 MIN: CPT | Mod: S$GLB,,, | Performed by: NURSE PRACTITIONER

## 2023-12-08 PROCEDURE — 99214 PR OFFICE/OUTPT VISIT, EST, LEVL IV, 30-39 MIN: ICD-10-PCS | Mod: S$GLB,,, | Performed by: NURSE PRACTITIONER

## 2023-12-08 PROCEDURE — 99999 PR PBB SHADOW E&M-EST. PATIENT-LVL IV: CPT | Mod: PBBFAC,,, | Performed by: NURSE PRACTITIONER

## 2023-12-08 PROCEDURE — 1159F PR MEDICATION LIST DOCUMENTED IN MEDICAL RECORD: ICD-10-PCS | Mod: CPTII,S$GLB,, | Performed by: NURSE PRACTITIONER

## 2023-12-08 PROCEDURE — 3061F NEG MICROALBUMINURIA REV: CPT | Mod: CPTII,S$GLB,, | Performed by: NURSE PRACTITIONER

## 2023-12-08 PROCEDURE — 3074F SYST BP LT 130 MM HG: CPT | Mod: CPTII,S$GLB,, | Performed by: NURSE PRACTITIONER

## 2023-12-08 PROCEDURE — 3044F HG A1C LEVEL LT 7.0%: CPT | Mod: CPTII,S$GLB,, | Performed by: NURSE PRACTITIONER

## 2023-12-08 PROCEDURE — 3074F PR MOST RECENT SYSTOLIC BLOOD PRESSURE < 130 MM HG: ICD-10-PCS | Mod: CPTII,S$GLB,, | Performed by: NURSE PRACTITIONER

## 2023-12-08 PROCEDURE — 1160F RVW MEDS BY RX/DR IN RCRD: CPT | Mod: CPTII,S$GLB,, | Performed by: NURSE PRACTITIONER

## 2023-12-08 PROCEDURE — 3061F PR NEG MICROALBUMINURIA RESULT DOCUMENTED/REVIEW: ICD-10-PCS | Mod: CPTII,S$GLB,, | Performed by: NURSE PRACTITIONER

## 2023-12-08 PROCEDURE — 3008F PR BODY MASS INDEX (BMI) DOCUMENTED: ICD-10-PCS | Mod: CPTII,S$GLB,, | Performed by: NURSE PRACTITIONER

## 2023-12-08 PROCEDURE — 99999 PR PBB SHADOW E&M-EST. PATIENT-LVL IV: ICD-10-PCS | Mod: PBBFAC,,, | Performed by: NURSE PRACTITIONER

## 2023-12-08 PROCEDURE — 3044F PR MOST RECENT HEMOGLOBIN A1C LEVEL <7.0%: ICD-10-PCS | Mod: CPTII,S$GLB,, | Performed by: NURSE PRACTITIONER

## 2023-12-08 PROCEDURE — 3008F BODY MASS INDEX DOCD: CPT | Mod: CPTII,S$GLB,, | Performed by: NURSE PRACTITIONER

## 2023-12-08 PROCEDURE — 3066F NEPHROPATHY DOC TX: CPT | Mod: CPTII,S$GLB,, | Performed by: NURSE PRACTITIONER

## 2023-12-08 PROCEDURE — 1160F PR REVIEW ALL MEDS BY PRESCRIBER/CLIN PHARMACIST DOCUMENTED: ICD-10-PCS | Mod: CPTII,S$GLB,, | Performed by: NURSE PRACTITIONER

## 2023-12-08 PROCEDURE — 3066F PR DOCUMENTATION OF TREATMENT FOR NEPHROPATHY: ICD-10-PCS | Mod: CPTII,S$GLB,, | Performed by: NURSE PRACTITIONER

## 2023-12-08 NOTE — PATIENT INSTRUCTIONS
Follow up in 5 months w/Irielle  A1c prior -5 months     Lab Results   Component Value Date    HGBA1C 5.7 (H) 11/27/2023     Goal less than 7%    [email or web address]  Eat fit gina  MyChangersnesspal gina  [email or web address]    mySugr gina     Goal  no higher than 180/200     Ozempic 1 mg weekly  Jardiance 10 mg daily   Berberine- can restart 1000 mg daily

## 2023-12-08 NOTE — Clinical Note
Nam Matta-needs RD/nutrition f/u  Looking great- Dr. Jane! Berberine - I feel is fine for replacement of metformin

## 2023-12-11 ENCOUNTER — TELEPHONE (OUTPATIENT)
Dept: DIABETES | Facility: CLINIC | Age: 48
End: 2023-12-11

## 2023-12-13 ENCOUNTER — PATIENT MESSAGE (OUTPATIENT)
Dept: BEHAVIORAL HEALTH | Facility: CLINIC | Age: 48
End: 2023-12-13
Payer: COMMERCIAL

## 2023-12-14 ENCOUNTER — TELEPHONE (OUTPATIENT)
Dept: BEHAVIORAL HEALTH | Facility: CLINIC | Age: 48
End: 2023-12-14
Payer: COMMERCIAL

## 2023-12-14 NOTE — PROGRESS NOTES
Behavioral Health Community Health Worker  Initial Assessment  Completed by:  Reyna Alonzo    Date:  12/14/2023    Patient Enrollment in Behavioral Health Program:  Patient verbalized understanding of Behavioral Health Integration services to include:  Patient understands that CHW, LCSW, PharmD and consulting Psychiatrist are members of the care team working collaboratively with his/her primary care provider: Yes  Patient understands that activation of their [facility] patient portal account is required for accessing the full scope of team services: Yes  Patient understands that some counseling sessions may occur via video: Yes  Clinic visits with the psychiatrist may be subject to a co-pay based on your insurance: Yes  Patient consents to enroll in BHI program: Yes    Assessments     Single Item Health Literacy Scale:  How often do you need to have someone help you read instructions, pamphlets or other written material from your doctor or pharmacy?: Never    Promis 10:  Promis 10 Responses  In general, would you say your health is: Fair  In general, would you say your quality of life is: Very good  In general, how would you rate your physical health?: Fair  In general, how would you rate your mental health, including your mood and your ability to think?: Very good  In general, how would you rate your satisfaction with your social activities and relationships?: Very good  In general, please rate how well you carry out your usual social activities and roles. (This includes activities at home, at work and in your community, and responsibilities as a parent, child, spouse, employee, friend, etc.): Very good  To what extent are you able to carry out your everyday physical activities such as walking, climbing stairs, carrying groceries, or moving a chair? : Completely  How often have you been bothered by emotional problems such as feeling anxious, depressed or irritable?: Sometimes  In the past 7 days, how would you  rate your fatigue on average?: Moderate  In the past 7 days, on a scale of 0 to 10 (where 0 is no pain and 10 is the worst pain imaginable) how would you rate your pain on average?: 3  Global Physical Health: 13  Global Mental health Score: 15    Depression PHQ:      12/14/2023    10:49 AM 1/19/2023     1:04 PM 5/2/2022     8:14 AM   PHQ-9 Depression Patient Health Questionnaire   Over the last two weeks how often have you been bothered by little interest or pleasure in doing things 1 0 0   Over the last two weeks how often have you been bothered by feeling down, depressed or hopeless 2 0 0   Over the last two weeks how often have you been bothered by trouble falling or staying asleep, or sleeping too much 1     Over the last two weeks how often have you been bothered by feeling tired or having little energy 1     Over the last two weeks how often have you been bothered by a poor appetite or overeating 2     Over the last two weeks how often have you been bothered by feeling bad about yourself - or that you are a failure or have let yourself or your family down 2     Over the last two weeks how often have you been bothered by trouble concentrating on things, such as reading the newspaper or watching television 2     Over the last two weeks how often have you been bothered by moving or speaking so slowly that other people could have noticed. 0     Over the last two weeks how often have you been bothered by thoughts that you would be better off dead, or of hurting yourself 0     If you checked off any problems, how difficult have these problems made it for you to do your work, take care of things at home or get along with other people? Not difficult at all     PHQ-9 Score 11            Generalized Anxiety Disorder 7-Item Scale:      12/14/2023    10:51 AM   GAD7   1. Feeling nervous, anxious, or on edge? 0   2. Not being able to stop or control worrying? 2   3. Worrying too much about different things? 2   4. Trouble  "relaxing? 2   5. Being so restless that it is hard to sit still? 0   6. Becoming easily annoyed or irritable? 3   7. Feeling afraid as if something awful might happen? 3   8. If you checked off any problems, how difficult have these problems made it for you to do your work, take care of things at home, or get along with other people? 1   ROBBIE-7 Score 12       History     Social History     Socioeconomic History    Marital status:    Occupational History    Occupation: [employer]      Employer: [employer]   Tobacco Use    Smoking status: Former     Types: Cigarettes    Smokeless tobacco: Never    Tobacco comments:     Quit in 1994   Substance and Sexual Activity    Alcohol use: Yes     Comment: rarely    Drug use: No    Sexual activity: Yes     Partners: Male   Social History Narrative    Lives w/ () and kids (14, 17, 18). Ed consultant self employed. Occ exercise.       Call Summary   Patient was referred to the BHI (Non-opioid) program by Primary Care Provider,  CHW contacted Tete Liu who reports stress that limits [his/her] activities of daily living (ADLs).   Patient scored "11" on the PHQ9 and "12" on the ROBBIE 7. Based on these scores patient is eligible for the Behavioral health Integration (Non-opioid) Program. CHW completed the intake and scheduled an appointment for patient with Lilia Weaver LCSW, on 1/9/24.          "

## 2024-01-09 ENCOUNTER — PATIENT MESSAGE (OUTPATIENT)
Dept: BEHAVIORAL HEALTH | Facility: CLINIC | Age: 49
End: 2024-01-09
Payer: COMMERCIAL

## 2024-01-22 NOTE — TELEPHONE ENCOUNTER
No care due was identified.  Health [facility] Embedded Care Due Messages. Reference number: [id number].   1/22/2024 1:34:46 PM CST

## 2024-01-23 NOTE — TELEPHONE ENCOUNTER
Refill Decision Note   Tete Liu  is requesting a refill authorization.  Brief Assessment and Rationale for Refill:  Approve     Medication Therapy Plan:         Comments:     Note composed:3:26 AM 01/23/2024

## 2024-02-29 RX ORDER — SEMAGLUTIDE 1.34 MG/ML
1 INJECTION, SOLUTION SUBCUTANEOUS
Qty: 1 EACH | Refills: 6 | Status: SHIPPED | OUTPATIENT
Start: 2024-02-29 | End: 2025-02-28

## 2024-03-12 NOTE — NURSING
Pt transported to OR via wheelchair. VSS, no acute signs of distress noted. Pt's  informed of surgery time, phone number in sticky note for updates regarding surgery.  
pt with left calf pain after walking yesterday  states hear "pop" in muscle & them had pain

## 2024-03-17 ENCOUNTER — PATIENT MESSAGE (OUTPATIENT)
Dept: INTERNAL MEDICINE | Facility: CLINIC | Age: 49
End: 2024-03-17
Payer: COMMERCIAL

## 2024-03-18 ENCOUNTER — PATIENT MESSAGE (OUTPATIENT)
Dept: INTERNAL MEDICINE | Facility: CLINIC | Age: 49
End: 2024-03-18
Payer: COMMERCIAL

## 2024-03-18 RX ORDER — ERTUGLIFLOZIN 5 MG/1
5 TABLET, FILM COATED ORAL DAILY
Qty: 30 TABLET | Refills: 4 | Status: SHIPPED | OUTPATIENT
Start: 2024-03-18 | End: 2024-04-30 | Stop reason: SDUPTHER

## 2024-03-27 ENCOUNTER — HOSPITAL ENCOUNTER (OUTPATIENT)
Dept: RADIOLOGY | Facility: OTHER | Age: 49
Discharge: HOME | End: 2024-03-27
Attending: OBSTETRICS & GYNECOLOGY
Payer: COMMERCIAL

## 2024-03-27 DIAGNOSIS — Z12.31 VISIT FOR SCREENING MAMMOGRAM: ICD-10-CM

## 2024-03-27 DIAGNOSIS — Z80.3 FH: BREAST CANCER: ICD-10-CM

## 2024-03-27 DIAGNOSIS — N60.19 DIFFUSE CYSTIC MASTOPATHY: ICD-10-CM

## 2024-03-27 PROCEDURE — 77063 BREAST TOMOSYNTHESIS BI: CPT | Mod: 26,,, | Performed by: RADIOLOGY

## 2024-03-27 PROCEDURE — 77067 SCR MAMMO BI INCL CAD: CPT | Mod: 26,,, | Performed by: RADIOLOGY

## 2024-03-27 PROCEDURE — 77067 SCR MAMMO BI INCL CAD: CPT | Mod: TC

## 2024-04-09 ENCOUNTER — PATIENT MESSAGE (OUTPATIENT)
Dept: INTERNAL MEDICINE | Facility: CLINIC | Age: 49
End: 2024-04-09
Payer: COMMERCIAL

## 2024-04-09 DIAGNOSIS — E11.65 TYPE 2 DIABETES MELLITUS WITH HYPERGLYCEMIA, WITHOUT LONG-TERM CURRENT USE OF INSULIN: Primary | ICD-10-CM

## 2024-04-09 RX ORDER — METFORMIN HYDROCHLORIDE 500 MG/1
500 TABLET, EXTENDED RELEASE ORAL 2 TIMES DAILY WITH MEALS
Qty: 180 TABLET | Refills: 3 | Status: SHIPPED | OUTPATIENT
Start: 2024-04-09 | End: 2024-06-10 | Stop reason: SDUPTHER

## 2024-04-12 ENCOUNTER — LAB VISIT (OUTPATIENT)
Dept: LAB | Facility: HOSPITAL | Age: 49
End: 2024-04-12
Payer: COMMERCIAL

## 2024-04-12 DIAGNOSIS — E11.65 TYPE 2 DIABETES MELLITUS WITH HYPERGLYCEMIA, WITHOUT LONG-TERM CURRENT USE OF INSULIN: ICD-10-CM

## 2024-04-12 LAB
ESTIMATED AVG GLUCOSE: 131 MG/DL (ref 68–131)
HBA1C MFR BLD: 6.2 % (ref 4–5.6)

## 2024-04-12 PROCEDURE — 36415 COLL VENOUS BLD VENIPUNCTURE: CPT | Performed by: NURSE PRACTITIONER

## 2024-04-12 PROCEDURE — 83036 HEMOGLOBIN GLYCOSYLATED A1C: CPT | Performed by: NURSE PRACTITIONER

## 2024-04-18 ENCOUNTER — PATIENT MESSAGE (OUTPATIENT)
Dept: INTERNAL MEDICINE | Facility: CLINIC | Age: 49
End: 2024-04-18
Payer: COMMERCIAL

## 2024-05-01 RX ORDER — ERTUGLIFLOZIN 5 MG/1
5 TABLET, FILM COATED ORAL DAILY
Qty: 30 TABLET | Refills: 6 | Status: SHIPPED | OUTPATIENT
Start: 2024-05-01

## 2024-05-11 DIAGNOSIS — E11.65 TYPE 2 DIABETES MELLITUS WITH HYPERGLYCEMIA, WITHOUT LONG-TERM CURRENT USE OF INSULIN: ICD-10-CM

## 2024-05-11 NOTE — TELEPHONE ENCOUNTER
Care Due:                  Date            Visit Type   Department     Provider  --------------------------------------------------------------------------------                                EP -                              PRIMARY      [facility]  Last Visit: 12-      CARE ([facility])   MEDICINE       Yosef Jane  Next Visit: None Scheduled  None         None Found                                                            Last  Test          Frequency    Reason                     Performed    Due Date  --------------------------------------------------------------------------------    CMP.........  12 months..  atorvastatin.............  05- 05-    Health [facility] Embedded Care Due Messages. Reference number: [id number].   5/11/2024 10:51:45 AM CDT

## 2024-05-13 RX ORDER — ATORVASTATIN CALCIUM 40 MG/1
40 TABLET, FILM COATED ORAL DAILY
Qty: 90 TABLET | Refills: 1 | Status: SHIPPED | OUTPATIENT
Start: 2024-05-13 | End: 2024-06-10 | Stop reason: SDUPTHER

## 2024-06-10 ENCOUNTER — PATIENT MESSAGE (OUTPATIENT)
Dept: INTERNAL MEDICINE | Facility: CLINIC | Age: 49
End: 2024-06-10
Payer: COMMERCIAL

## 2024-06-10 DIAGNOSIS — E11.65 TYPE 2 DIABETES MELLITUS WITH HYPERGLYCEMIA, WITHOUT LONG-TERM CURRENT USE OF INSULIN: ICD-10-CM

## 2024-06-10 NOTE — TELEPHONE ENCOUNTER
No care due was identified.  [facility] Embedded Care Due Messages. Reference number: [id number].   6/10/2024 5:57:25 PM CDT

## 2024-06-11 RX ORDER — ATORVASTATIN CALCIUM 40 MG/1
40 TABLET, FILM COATED ORAL DAILY
Qty: 90 TABLET | Refills: 1 | Status: SHIPPED | OUTPATIENT
Start: 2024-06-11

## 2024-06-11 RX ORDER — METFORMIN HYDROCHLORIDE 500 MG/1
500 TABLET, EXTENDED RELEASE ORAL 2 TIMES DAILY WITH MEALS
Qty: 180 TABLET | Refills: 3 | Status: SHIPPED | OUTPATIENT
Start: 2024-06-11 | End: 2025-06-11

## 2024-06-11 NOTE — TELEPHONE ENCOUNTER
Refill Routing Note   Medication(s) are not appropriate for processing by [facility] for the following reason(s):        Required labs outdated    [facility] action(s):  Defer             Appointments  past 12m or future 3m with PCP    Date Provider   Last Visit   12/5/2023 Yosef Jane MD   Next Visit   Visit date not found Yosef Jane MD   ED visits in past 90 days: 0        Note composed:9:32 AM 06/11/2024

## 2024-08-20 ENCOUNTER — HOSPITAL ENCOUNTER (OUTPATIENT)
Dept: RADIOLOGY | Facility: HOSPITAL | Age: 49
Discharge: HOME | End: 2024-08-20
Attending: OBSTETRICS & GYNECOLOGY
Payer: COMMERCIAL

## 2024-08-20 ENCOUNTER — PATIENT MESSAGE (OUTPATIENT)
Dept: INTERNAL MEDICINE | Facility: CLINIC | Age: 49
End: 2024-08-20
Payer: COMMERCIAL

## 2024-08-20 DIAGNOSIS — Z79.890 POSTMENOPAUSAL HORMONE THERAPY: ICD-10-CM

## 2024-08-20 DIAGNOSIS — N95.0 POSTMENOPAUSAL BLEEDING: ICD-10-CM

## 2024-08-20 PROCEDURE — 76856 US EXAM PELVIC COMPLETE: CPT | Mod: 26,,, | Performed by: RADIOLOGY

## 2024-08-20 PROCEDURE — 76830 TRANSVAGINAL US NON-OB: CPT | Mod: TC

## 2024-08-20 PROCEDURE — 76830 TRANSVAGINAL US NON-OB: CPT | Mod: 26,,, | Performed by: RADIOLOGY

## 2024-08-20 PROCEDURE — 76856 US EXAM PELVIC COMPLETE: CPT | Mod: TC

## 2024-09-06 ENCOUNTER — LAB VISIT (OUTPATIENT)
Dept: LAB | Facility: HOSPITAL | Age: 49
End: 2024-09-06
Payer: COMMERCIAL

## 2024-09-06 DIAGNOSIS — E11.65 TYPE 2 DIABETES MELLITUS WITH HYPERGLYCEMIA, WITHOUT LONG-TERM CURRENT USE OF INSULIN: ICD-10-CM

## 2024-09-06 LAB
ESTIMATED AVG GLUCOSE: 117 MG/DL (ref 68–131)
HBA1C MFR BLD: 5.7 % (ref 4–5.6)

## 2024-09-06 PROCEDURE — 83036 HEMOGLOBIN GLYCOSYLATED A1C: CPT | Performed by: NURSE PRACTITIONER

## 2024-09-06 PROCEDURE — 36415 COLL VENOUS BLD VENIPUNCTURE: CPT | Performed by: NURSE PRACTITIONER

## 2024-09-09 ENCOUNTER — PATIENT MESSAGE (OUTPATIENT)
Dept: INTERNAL MEDICINE | Facility: CLINIC | Age: 49
End: 2024-09-09
Payer: COMMERCIAL

## 2024-09-12 ENCOUNTER — PATIENT MESSAGE (OUTPATIENT)
Dept: INTERNAL MEDICINE | Facility: CLINIC | Age: 49
End: 2024-09-12
Payer: COMMERCIAL

## 2024-09-18 DIAGNOSIS — E11.9 TYPE 2 DIABETES MELLITUS WITHOUT COMPLICATION, UNSPECIFIED WHETHER LONG TERM INSULIN USE: ICD-10-CM

## 2024-10-01 PROBLEM — F32.1 MAJOR DEPRESSIVE DISORDER, SINGLE EPISODE, MODERATE: Status: ACTIVE | Noted: 2024-10-01

## 2024-10-01 NOTE — PROGRESS NOTES
CHIEF COMPLAINT: Type 2 Diabetes     HPI: Mrs. Tete Liu is a 48 y.o. female who was diagnosed with Type 2 DM in 11/2/2021, >14% to 6.3%  Past Medical History:   Diagnosis Date    Allergy     Asthma     adult onset    Type 2 diabetes mellitus     diagnosed 11/2021       Needs reset  Off and on w/ ozempic, off jardiance.  Did generic for sglt2i- will like to change back to jardiance  Home renovation, stressors. More processed foods. No kitchen.   Added berberine in the past.  Lab Results   Component Value Date    HGBA1C 5.7 (H) 09/06/2024   A1c improved 6.2% to 5.7%    The patient location is: home  The chief complaint leading to consultation is: type 2 dm     Visit type: audiovisual    Face to Face time with patient: 15 , 20    minutes of total time spent on the encounter, which includes face to face time and non-face to face time preparing to see the patient (eg, review of tests), Obtaining and/or reviewing separately obtained history, Documenting clinical information in the electronic or other health record, Independently interpreting results (not separately reported) and communicating results to the patient/family/caregiver, or Care coordination (not separately reported).         Each patient to whom he or she provides medical services by telemedicine is:  (1) informed of the relationship between the physician and patient and the respective role of any other health care provider with respect to management of the patient; and (2) notified that he or she may decline to receive medical services by telemedicine and may withdraw from such care at any time.    Notes:    Off lantus x > 20 months ago    Goal to wean off antihyperglycemic agents  Traveling to [place] in June 2023.  C/o bilateral tingling in hands, cold feet over the past 4 months     Tracking BG 1x a day     Dietary habits:  Working on carb intake  Ground turkey   Chicken  No bread  Arsalan   charli seeds  Fruits - berries, apples  See above      Exercise:  5000 steps intentional   Works from desk   Walks dog      PREVIOUS DIABETES MEDICATIONS TRIED  lantus   Metformin   ozempic  Berberine    CURRENT DIABETIC MEDS:  ozempic 1 mg weekly-off , metformin xr 500 mg bid    Diabetes Management Status    Statin: Taking  ACE/ARB: Not taking    Screening or Prevention Patient's value Goal Complete/Controlled?   HgA1C Testing and Control   Lab Results   Component Value Date    HGBA1C 5.7 (H) 09/06/2024      Annually/Less than 8% Yes   Lipid profile : 11/27/2023 Annually Yes   LDL control Lab Results   Component Value Date    LDLCALC 64.4 11/27/2023    Annually/Less than 100 mg/dl  Yes   Nephropathy screening Lab Results   Component Value Date    LABMICR 8.0 12/05/2023     Lab Results   Component Value Date    PROTEINUA Trace (A) 07/06/2017    Annually No   Blood pressure BP Readings from Last 1 Encounters:   12/08/23 110/82    Less than 140/90 Yes   Dilated retinal exam Most Recent Eye Exam Date: Not Found Annually Yes   Foot exam   : 12/05/2023 Annually Yes     REVIEW OF SYSTEMS  General: no weakness, fatigue, +weight fluctuations  Eyes: no double or blurred vision, eye pain, or redness  Cardiovascular: no chest pain, palpitations, edema, or murmurs.   Respiratory: no cough or dyspnea. +h/o asthma  GI: no heartburn, nausea, or changes in bowel patterns; good appetite.  Skin: no rashes, dryness, itching, or reactions at insulin injection sites.  Neuro: no numbness, tingling, tremors, or vertigo.  Endocrine: no polyuria, polydipsia, polyphagia, heat or cold intolerance. '  +feeling overwhelmed     Vital Signs  LMP 06/20/2021 (Approximate)     Hemoglobin A1C   Date Value Ref Range Status   09/06/2024 5.7 (H) 4.0 - 5.6 % Final     Comment:     ADA Screening Guidelines:  5.7-6.4%  Consistent with prediabetes  >or=6.5%  Consistent with diabetes    High levels of fetal hemoglobin interfere with the HbA1C  assay. Heterozygous hemoglobin variants (HbS, HgC, etc)do  not significantly  interfere with this assay.   However, presence of multiple variants may affect accuracy.     04/12/2024 6.2 (H) 4.0 - 5.6 % Final     Comment:     ADA Screening Guidelines:  5.7-6.4%  Consistent with prediabetes  >or=6.5%  Consistent with diabetes    High levels of fetal hemoglobin interfere with the HbA1C  assay. Heterozygous hemoglobin variants (HbS, HgC, etc)do  not significantly interfere with this assay.   However, presence of multiple variants may affect accuracy.     11/27/2023 5.7 (H) 4.0 - 5.6 % Final     Comment:     ADA Screening Guidelines:  5.7-6.4%  Consistent with prediabetes  >or=6.5%  Consistent with diabetes    High levels of fetal hemoglobin interfere with the HbA1C  assay. Heterozygous hemoglobin variants (HbS, HgC, etc)do  not significantly interfere with this assay.   However, presence of multiple variants may affect accuracy.          Chemistry        Component Value Date/Time     11/27/2023 0753    K 4.1 11/27/2023 0753     11/27/2023 0753    CO2 28 11/27/2023 0753    BUN 15 11/27/2023 0753    CREATININE 0.7 11/27/2023 0753    GLU 93 11/27/2023 0753        Component Value Date/Time    CALCIUM 9.4 11/27/2023 0753    ALKPHOS 95 05/23/2023 0905    AST 17 05/23/2023 0905    ALT 21 05/23/2023 0905    BILITOT 0.4 05/23/2023 0905    ESTGFRAFRICA >60.0 02/08/2022 0753    EGFRNONAA >60.0 02/08/2022 0753           Lab Results   Component Value Date    TSH 0.629 05/23/2023      Lab Results   Component Value Date    CHOL 133 11/27/2023    CHOL 221 (H) 05/23/2023    CHOL 113 (L) 02/08/2022     Lab Results   Component Value Date    HDL 44 11/27/2023    HDL 40 05/23/2023    HDL 37 (L) 02/08/2022     Lab Results   Component Value Date    LDLCALC 64.4 11/27/2023    LDLCALC 139.0 05/23/2023    LDLCALC 56.6 (L) 02/08/2022     Lab Results   Component Value Date    TRIG 123 11/27/2023    TRIG 210 (H) 05/23/2023    TRIG 97 02/08/2022     Lab Results   Component Value Date    CHOLHDL 33.1 11/27/2023     CHOLHDL 18.1 (L) 05/23/2023    CHOLHDL 32.7 02/08/2022         PHYSICAL EXAMINATION  Constitutional: Appears well, no distress     Diabetes Foot Exam: deferred   Assessment/Plan    1. Type 2 diabetes mellitus with hyperglycemia, without long-term current use of insulin  Hemoglobin A1C  D/c ozempic  Send rx mounjaro 5 mg weekly  Jardiance 10 mg daily  Continue metformin xr 500 mb bid   A1c below goal  Goal < 6.5%  At goal         2. Iron deficiency anemia, unspecified iron deficiency anemia type  May skew A1c, lowering affect   Stable       3. Obesity (BMI 30-39.9)  Adding mounjaro  Encourage exercise 3-5 x a week         4. Hyperlipidemia, unspecified hyperlipidemia type  Lab Results   Component Value Date    LDLCALC 64.4 11/27/2023     At goal   On moderate dose statin   Lipid next time       5. Vitamin D deficiency  Watch levels, stable      6. Counseling and coordination of care  See above       7. Major depressive disorder, single episode, moderate  Stable   No meds      8. Bilateral hand pain  Recs: alpha lipoic acid 600-1200 mg daily, b complex daily          FOLLOW UP  In 4 months        Answers submitted by the patient for this visit:  Diabetes Questionnaire (Submitted on 10/2/2024)  Chief Complaint: Diabetes problem  Diabetes type: type 2  MedicAlert ID: No  Disease duration: 2 Years  blurred vision: No  chest pain: No  fatigue: No  foot paresthesias: Yes  foot ulcerations: No  polydipsia: No  polyphagia: Yes  polyuria: No  visual change: No  weakness: No  weight loss: No  Symptom course: worsening  confusion: No  speech difficulty: No  dizziness: No  nervous/anxious: Yes  headaches: No  hunger: Yes  mood changes: No  pallor: No  seizures: No  tremors: No  sleepiness: No  sweats: Yes  blackouts: No  hospitalization: No  nocturnal hypoglycemia: No  required assistance: No  required glucagon: No  CVA: No  heart disease: No  nephropathy: No  peripheral neuropathy: Yes  PVD: No  retinopathy: No  autonomic  neuropathy: No  CAD risks: no known risk factors, obesity, post-menopausal  Current treatments: oral agent (dual therapy)  Treatment compliance: most of the time  Home blood tests: 3-4 x per week  Monitoring compliance: fair  Blood glucose trend: increasing steadily  Weight trend: fluctuating minimally  Current diet: generally unhealthy  Meal planning: none  Exercise: weekly  Dietitian visit: No  Eye exam current: Yes  Sees podiatrist: No

## 2024-10-02 ENCOUNTER — PATIENT MESSAGE (OUTPATIENT)
Dept: INTERNAL MEDICINE | Facility: CLINIC | Age: 49
End: 2024-10-02

## 2024-10-02 ENCOUNTER — OFFICE VISIT (OUTPATIENT)
Dept: INTERNAL MEDICINE | Facility: CLINIC | Age: 49
End: 2024-10-02
Payer: COMMERCIAL

## 2024-10-02 VITALS — BODY MASS INDEX: 32.12 KG/M2 | WEIGHT: 193 LBS

## 2024-10-02 DIAGNOSIS — Z71.89 COUNSELING AND COORDINATION OF CARE: ICD-10-CM

## 2024-10-02 DIAGNOSIS — E66.9 OBESITY (BMI 30-39.9): ICD-10-CM

## 2024-10-02 DIAGNOSIS — E55.9 VITAMIN D DEFICIENCY: ICD-10-CM

## 2024-10-02 DIAGNOSIS — E11.65 TYPE 2 DIABETES MELLITUS WITH HYPERGLYCEMIA, WITHOUT LONG-TERM CURRENT USE OF INSULIN: Primary | ICD-10-CM

## 2024-10-02 DIAGNOSIS — E78.5 HYPERLIPIDEMIA, UNSPECIFIED HYPERLIPIDEMIA TYPE: ICD-10-CM

## 2024-10-02 DIAGNOSIS — F32.1 MAJOR DEPRESSIVE DISORDER, SINGLE EPISODE, MODERATE: ICD-10-CM

## 2024-10-02 DIAGNOSIS — M79.641 BILATERAL HAND PAIN: ICD-10-CM

## 2024-10-02 DIAGNOSIS — D50.9 IRON DEFICIENCY ANEMIA, UNSPECIFIED IRON DEFICIENCY ANEMIA TYPE: ICD-10-CM

## 2024-10-02 DIAGNOSIS — M79.642 BILATERAL HAND PAIN: ICD-10-CM

## 2024-10-02 PROCEDURE — 3008F BODY MASS INDEX DOCD: CPT | Mod: CPTII,95,, | Performed by: NURSE PRACTITIONER

## 2024-10-02 PROCEDURE — 99214 OFFICE O/P EST MOD 30 MIN: CPT | Mod: 95,,, | Performed by: NURSE PRACTITIONER

## 2024-10-02 PROCEDURE — 1160F RVW MEDS BY RX/DR IN RCRD: CPT | Mod: CPTII,95,, | Performed by: NURSE PRACTITIONER

## 2024-10-02 PROCEDURE — 3044F HG A1C LEVEL LT 7.0%: CPT | Mod: CPTII,95,, | Performed by: NURSE PRACTITIONER

## 2024-10-02 PROCEDURE — 1159F MED LIST DOCD IN RCRD: CPT | Mod: CPTII,95,, | Performed by: NURSE PRACTITIONER

## 2024-10-02 RX ORDER — TIRZEPATIDE 5 MG/.5ML
5 INJECTION, SOLUTION SUBCUTANEOUS
Qty: 4 PEN | Refills: 4 | Status: SHIPPED | OUTPATIENT
Start: 2024-10-02

## 2024-10-02 NOTE — Clinical Note
Changing to mounjaro and jardiance A1c looks great:) F/u in 4 months , A1c prior -thanks Miya! Lipid panel this year

## 2024-10-07 PROBLEM — M79.642 BILATERAL HAND PAIN: Status: ACTIVE | Noted: 2024-10-07

## 2024-10-07 PROBLEM — M79.641 BILATERAL HAND PAIN: Status: ACTIVE | Noted: 2024-10-07

## 2024-10-16 RX ORDER — TIRZEPATIDE 5 MG/.5ML
5 INJECTION, SOLUTION SUBCUTANEOUS
Qty: 4 PEN | Refills: 4 | Status: SHIPPED | OUTPATIENT
Start: 2024-10-16

## 2024-10-24 ENCOUNTER — PATIENT MESSAGE (OUTPATIENT)
Dept: INTERNAL MEDICINE | Facility: CLINIC | Age: 49
End: 2024-10-24
Payer: COMMERCIAL

## 2024-10-24 RX ORDER — INSULIN PUMP SYRINGE, 3 ML
EACH MISCELLANEOUS
Qty: 1 EACH | Refills: 0 | Status: SHIPPED | OUTPATIENT
Start: 2024-10-24 | End: 2025-10-24

## 2024-10-24 RX ORDER — LANCETS
EACH MISCELLANEOUS
Qty: 50 EACH | Refills: 11 | Status: SHIPPED | OUTPATIENT
Start: 2024-10-24

## 2024-11-07 NOTE — TRANSFER OF CARE
"Anesthesia Transfer of Care Note    Patient: Tete Liu    Procedure(s) Performed: Procedure(s) (LRB):  DLB, FB removal throat (N/A)    Patient location: PACU    Anesthesia Type: general    Transport from OR: Transported from OR on 6-10 L/min O2 by face mask with adequate spontaneous ventilation    Post pain: adequate analgesia    Post assessment: no apparent anesthetic complications    Post vital signs: stable    Level of consciousness: oriented and awake    Nausea/Vomiting: no nausea/vomiting    Complications: none    Transfer of care protocol was followedComments: 98% RR16 73 T97      Last vitals:   Visit Vitals  /74   Pulse 76   Temp 36.1 °C (96.9 °F) (Axillary)   Resp 20   Ht 5' 4" (1.626 m)   Wt 97.5 kg (215 lb)   LMP 04/28/2017 (Approximate)   SpO2 98%   Breastfeeding? No   BMI 36.90 kg/m²     " No

## 2024-11-15 ENCOUNTER — LAB VISIT (OUTPATIENT)
Dept: LAB | Facility: HOSPITAL | Age: 49
End: 2024-11-15
Payer: COMMERCIAL

## 2024-11-15 DIAGNOSIS — E78.5 HYPERLIPIDEMIA, UNSPECIFIED HYPERLIPIDEMIA TYPE: ICD-10-CM

## 2024-11-15 LAB
CHOLEST SERPL-MCNC: 118 MG/DL (ref 120–199)
CHOLEST/HDLC SERPL: 2.7 {RATIO} (ref 2–5)
HDLC SERPL-MCNC: 43 MG/DL (ref 40–75)
HDLC SERPL: 36.4 % (ref 20–50)
LDLC SERPL CALC-MCNC: 50.6 MG/DL (ref 63–159)
NONHDLC SERPL-MCNC: 75 MG/DL
TRIGL SERPL-MCNC: 122 MG/DL (ref 30–150)

## 2024-11-15 PROCEDURE — 36415 COLL VENOUS BLD VENIPUNCTURE: CPT | Performed by: NURSE PRACTITIONER

## 2024-11-15 PROCEDURE — 80061 LIPID PANEL: CPT | Performed by: NURSE PRACTITIONER

## 2024-11-20 ENCOUNTER — PATIENT MESSAGE (OUTPATIENT)
Dept: INTERNAL MEDICINE | Facility: CLINIC | Age: 49
End: 2024-11-20
Payer: COMMERCIAL

## 2024-12-26 ENCOUNTER — PATIENT OUTREACH (OUTPATIENT)
Dept: ADMINISTRATIVE | Facility: HOSPITAL | Age: 49
End: 2024-12-26
Payer: COMMERCIAL

## 2024-12-26 NOTE — PROGRESS NOTES
Chart reviewed and updated. Reconciled immunizations.  Attempted to contact pt to schedule diabetic eyecam no success, lvm.      Malena RAMIREZ, Medical Assistant  Panel Care Coordinator  [facility]

## 2025-01-09 DIAGNOSIS — J45.20 ASTHMA IN ADULT, MILD INTERMITTENT, UNCOMPLICATED: ICD-10-CM

## 2025-01-09 RX ORDER — ALBUTEROL SULFATE 90 UG/1
2 INHALANT RESPIRATORY (INHALATION) EVERY 4 HOURS PRN
Qty: 54 G | Refills: 0 | Status: SHIPPED | OUTPATIENT
Start: 2025-01-09

## 2025-01-09 NOTE — TELEPHONE ENCOUNTER
Care Due:                  Date            Visit Type   Department     Provider  --------------------------------------------------------------------------------                                EP -                              PRIMARY      [facility]  Last Visit: 12-      CARE ([facility])   MEDICINE       Yosef Jane  Next Visit: None Scheduled  None         None Found                                                            Last  Test          Frequency    Reason                     Performed    Due Date  --------------------------------------------------------------------------------    Office Visit  15 months..  albuterol, atorvastatin..  12- 02-    CMP.........  12 months..  atorvastatin.............  05- 05-    Health [facility] Embedded Care Due Messages. Reference number: [id number].   1/09/2025 12:51:33 AM CST

## 2025-01-09 NOTE — TELEPHONE ENCOUNTER
Refill Routing Note   Medication(s) are not appropriate for processing by [facility] for the following reason(s):        Required vitals outdated    [facility] action(s):  Defer   Requires appointment : Yes     Requires labs : Yes             Appointments  past 12m or future 3m with PCP    Date Provider   Last Visit   12/5/2023 Yosef Jane MD   Next Visit   Visit date not found Yosef Jane MD   ED visits in past 90 days: 0        Note composed:7:39 AM 01/09/2025

## 2025-01-16 ENCOUNTER — PATIENT OUTREACH (OUTPATIENT)
Dept: INTERNAL MEDICINE | Facility: CLINIC | Age: 50
End: 2025-01-16
Payer: COMMERCIAL

## 2025-01-16 DIAGNOSIS — E11.9 DIABETIC EYE EXAM: ICD-10-CM

## 2025-01-16 DIAGNOSIS — E11.65 TYPE 2 DIABETES MELLITUS WITH HYPERGLYCEMIA, WITHOUT LONG-TERM CURRENT USE OF INSULIN: Primary | ICD-10-CM

## 2025-01-16 DIAGNOSIS — Z12.31 BREAST CANCER SCREENING BY MAMMOGRAM: ICD-10-CM

## 2025-01-16 DIAGNOSIS — Z01.00 DIABETIC EYE EXAM: ICD-10-CM

## 2025-01-16 NOTE — PROGRESS NOTES
Chart reviewed and updated. Reconciled immunizations.  Placed referral for diabetic eye cam, MMG and urinesent portal msg for scheduling.      Malena Pichardo [facility]  Panel Care Coordinator  [facility]  [phone]  For Yosef Jane MD

## 2025-01-31 ENCOUNTER — LAB VISIT (OUTPATIENT)
Dept: LAB | Facility: HOSPITAL | Age: 50
End: 2025-01-31
Payer: COMMERCIAL

## 2025-01-31 DIAGNOSIS — E11.65 TYPE 2 DIABETES MELLITUS WITH HYPERGLYCEMIA, WITHOUT LONG-TERM CURRENT USE OF INSULIN: ICD-10-CM

## 2025-01-31 LAB
ESTIMATED AVG GLUCOSE: 108 MG/DL (ref 68–131)
HBA1C MFR BLD: 5.4 % (ref 4–5.6)
LEFT EYE DM RETINOPATHY: NEGATIVE
RIGHT EYE DM RETINOPATHY: NEGATIVE

## 2025-01-31 PROCEDURE — 36415 COLL VENOUS BLD VENIPUNCTURE: CPT | Performed by: NURSE PRACTITIONER

## 2025-01-31 PROCEDURE — 83036 HEMOGLOBIN GLYCOSYLATED A1C: CPT | Performed by: NURSE PRACTITIONER

## 2025-02-03 ENCOUNTER — PATIENT OUTREACH (OUTPATIENT)
Dept: ADMINISTRATIVE | Facility: HOSPITAL | Age: 50
End: 2025-02-03
Payer: COMMERCIAL

## 2025-02-03 NOTE — PROGRESS NOTES
Chart reviewed and updated. Reconciled immunizations, health maintenance and care everywhere.  Importing diabetic eye exam 01.31.2025.    Malena Pichardo [facility]  Panel Care Coordinator  [facility]  [phone]  For Yosef Jane MD

## 2025-02-04 ENCOUNTER — OFFICE VISIT (OUTPATIENT)
Dept: INTERNAL MEDICINE | Facility: CLINIC | Age: 50
End: 2025-02-04
Payer: COMMERCIAL

## 2025-02-04 ENCOUNTER — IMMUNIZATION (OUTPATIENT)
Dept: INTERNAL MEDICINE | Facility: CLINIC | Age: 50
End: 2025-02-04
Payer: COMMERCIAL

## 2025-02-04 ENCOUNTER — LAB VISIT (OUTPATIENT)
Dept: LAB | Facility: HOSPITAL | Age: 50
End: 2025-02-04
Attending: INTERNAL MEDICINE
Payer: COMMERCIAL

## 2025-02-04 VITALS
HEIGHT: 65 IN | WEIGHT: 193.56 LBS | DIASTOLIC BLOOD PRESSURE: 70 MMHG | BODY MASS INDEX: 32.25 KG/M2 | SYSTOLIC BLOOD PRESSURE: 126 MMHG | HEART RATE: 73 BPM | OXYGEN SATURATION: 96 %

## 2025-02-04 DIAGNOSIS — Z00.00 ROUTINE GENERAL MEDICAL EXAMINATION AT A HEALTH CARE FACILITY: ICD-10-CM

## 2025-02-04 DIAGNOSIS — Z23 NEED FOR VACCINATION: Primary | ICD-10-CM

## 2025-02-04 DIAGNOSIS — R20.0 BILATERAL HAND NUMBNESS: ICD-10-CM

## 2025-02-04 DIAGNOSIS — E11.65 TYPE 2 DIABETES MELLITUS WITH HYPERGLYCEMIA, WITHOUT LONG-TERM CURRENT USE OF INSULIN: Primary | ICD-10-CM

## 2025-02-04 DIAGNOSIS — J45.20 ASTHMA IN ADULT, MILD INTERMITTENT, UNCOMPLICATED: ICD-10-CM

## 2025-02-04 PROBLEM — F32.1 MAJOR DEPRESSIVE DISORDER, SINGLE EPISODE, MODERATE: Status: RESOLVED | Noted: 2024-10-01 | Resolved: 2025-02-04

## 2025-02-04 LAB
ALBUMIN SERPL BCP-MCNC: 4.4 G/DL (ref 3.5–5.2)
ALP SERPL-CCNC: 109 U/L (ref 40–150)
ALT SERPL W/O P-5'-P-CCNC: 23 U/L (ref 10–44)
ANION GAP SERPL CALC-SCNC: 11 MMOL/L (ref 8–16)
AST SERPL-CCNC: 21 U/L (ref 10–40)
BILIRUB SERPL-MCNC: 0.6 MG/DL (ref 0.1–1)
BUN SERPL-MCNC: 11 MG/DL (ref 6–20)
CALCIUM SERPL-MCNC: 9.6 MG/DL (ref 8.7–10.5)
CHLORIDE SERPL-SCNC: 104 MMOL/L (ref 95–110)
CO2 SERPL-SCNC: 23 MMOL/L (ref 23–29)
CREAT SERPL-MCNC: 0.7 MG/DL (ref 0.5–1.4)
EST. GFR  (NO RACE VARIABLE): >60 ML/MIN/1.73 M^2
GLUCOSE SERPL-MCNC: 74 MG/DL (ref 70–110)
POTASSIUM SERPL-SCNC: 4.1 MMOL/L (ref 3.5–5.1)
PROT SERPL-MCNC: 7.5 G/DL (ref 6–8.4)
SODIUM SERPL-SCNC: 138 MMOL/L (ref 136–145)

## 2025-02-04 PROCEDURE — 99999 PR PBB SHADOW E&M-EST. PATIENT-LVL IV: CPT | Mod: PBBFAC,,, | Performed by: INTERNAL MEDICINE

## 2025-02-04 PROCEDURE — 99396 PREV VISIT EST AGE 40-64: CPT | Mod: S$GLB,,, | Performed by: INTERNAL MEDICINE

## 2025-02-04 PROCEDURE — 3061F NEG MICROALBUMINURIA REV: CPT | Mod: CPTII,S$GLB,, | Performed by: INTERNAL MEDICINE

## 2025-02-04 PROCEDURE — 3078F DIAST BP <80 MM HG: CPT | Mod: CPTII,S$GLB,, | Performed by: INTERNAL MEDICINE

## 2025-02-04 PROCEDURE — 3066F NEPHROPATHY DOC TX: CPT | Mod: CPTII,S$GLB,, | Performed by: INTERNAL MEDICINE

## 2025-02-04 PROCEDURE — 3044F HG A1C LEVEL LT 7.0%: CPT | Mod: CPTII,S$GLB,, | Performed by: INTERNAL MEDICINE

## 2025-02-04 PROCEDURE — 90656 IIV3 VACC NO PRSV 0.5 ML IM: CPT | Mod: S$GLB,,, | Performed by: INTERNAL MEDICINE

## 2025-02-04 PROCEDURE — 36415 COLL VENOUS BLD VENIPUNCTURE: CPT | Performed by: INTERNAL MEDICINE

## 2025-02-04 PROCEDURE — 3074F SYST BP LT 130 MM HG: CPT | Mod: CPTII,S$GLB,, | Performed by: INTERNAL MEDICINE

## 2025-02-04 PROCEDURE — 2023F DILAT RTA XM W/O RTNOPTHY: CPT | Mod: CPTII,S$GLB,, | Performed by: INTERNAL MEDICINE

## 2025-02-04 PROCEDURE — 90471 IMMUNIZATION ADMIN: CPT | Mod: S$GLB,,, | Performed by: INTERNAL MEDICINE

## 2025-02-04 PROCEDURE — 1160F RVW MEDS BY RX/DR IN RCRD: CPT | Mod: CPTII,S$GLB,, | Performed by: INTERNAL MEDICINE

## 2025-02-04 PROCEDURE — 1159F MED LIST DOCD IN RCRD: CPT | Mod: CPTII,S$GLB,, | Performed by: INTERNAL MEDICINE

## 2025-02-04 PROCEDURE — 3008F BODY MASS INDEX DOCD: CPT | Mod: CPTII,S$GLB,, | Performed by: INTERNAL MEDICINE

## 2025-02-04 PROCEDURE — 80053 COMPREHEN METABOLIC PANEL: CPT | Performed by: INTERNAL MEDICINE

## 2025-02-04 RX ORDER — ATORVASTATIN CALCIUM 40 MG/1
40 TABLET, FILM COATED ORAL DAILY
Qty: 90 TABLET | Refills: 11 | Status: SHIPPED | OUTPATIENT
Start: 2025-02-04 | End: 2025-02-20 | Stop reason: SDUPTHER

## 2025-02-04 RX ORDER — LANCETS
EACH MISCELLANEOUS
Qty: 100 EACH | Refills: 11 | Status: SHIPPED | OUTPATIENT
Start: 2025-02-04

## 2025-02-04 RX ORDER — INSULIN PUMP SYRINGE, 3 ML
EACH MISCELLANEOUS
Qty: 1 EACH | Refills: 0 | Status: SHIPPED | OUTPATIENT
Start: 2025-02-04

## 2025-02-04 NOTE — PROGRESS NOTES
Subjective     Patient ID: Tete Liu is a 49 y.o. female.    Chief Complaint: Annual Exam             History of Present Illness    Who CHIEF COMPLAINT:  Tete presents today for allergist referral and hand numbness.    NEUROLOGICAL SYMPTOMS:  She reports hand numbness initially occurring only upon waking but now also present during the day, primarily affecting the fingertips. Symptoms have been ongoing for 8-10 months and improve with movement. She denies any weakness in fingers, such as difficulty with buttoning or opening jars. She experiences occasional tingling in her feet.    ALLERGIES AND ASTHMA:  She reports worsening allergies, particularly related to her dogs, managed with daily Zyrtec. She expresses concern about long-term Zyrtec use. For the past couple months, she has experienced morning wheezing upon waking requiring albuterol use. She denies chest pain.    DIABETES:  She reports not checking blood sugar regularly and has misplaced her glucose meter. She expresses interest in obtaining a new meter to resume monitoring.    FAMILY HISTORY:  Family history significant for breast cancer.    SOCIAL HISTORY:  She lives with two sons, ages 19 and 16, and has a daughter in college who is 21. She works as a consultant.      ROS:  Constitutional: negative activity change, negative unexpected weight change  HENT: negative trouble swallowing  Eyes: negative discharge, negative visual disturbance  Respiratory: negative chest tightness, +wheezing, negative shortness of breath  Cardiovascular: negative chest pain, negative palpitations  Gastrointestinal: negative blood in stool, negative constipation, negative diarrhea, negative vomiting  Endocrine: negative polydipsia, negative polyuria  Genitourinary: negative difficulty urinating, negative dysuria, negative hematuria  Musculoskeletal: negative arthralgias, negative joint swelling, negative neck pain  Neurological: negative weakness, negative headaches, +numbness,  +tingling (no numbness or tingling in the feet bilaterally)  Psychiatric/Behavioral: negative confusion, negative dysphoric mood       Would like to see allergist back, prev saw Dr. Smart.    Bilat hand numbness.    HPI  Review of Systems     Objective     Physical Exam  Vitals reviewed.   Constitutional:       General: She is not in acute distress.     Appearance: Normal appearance. She is well-developed. She is obese. She is not ill-appearing, toxic-appearing or diaphoretic.   HENT:      Head: Normocephalic and atraumatic.   Eyes:      General: No scleral icterus.     Pupils: Pupils are equal, round, and reactive to light.   Neck:      Thyroid: No thyromegaly.   Cardiovascular:      Rate and Rhythm: Normal rate and regular rhythm.      Heart sounds: Normal heart sounds. No murmur heard.     No friction rub. No gallop.   Pulmonary:      Effort: Pulmonary effort is normal. No respiratory distress.      Breath sounds: Normal breath sounds. No wheezing or rales.   Abdominal:      General: Bowel sounds are normal. There is no distension.      Palpations: Abdomen is soft. There is no mass.      Tenderness: There is no abdominal tenderness. There is no guarding or rebound.   Musculoskeletal:         General: No tenderness. Normal range of motion.      Cervical back: Normal range of motion.      Comments: Positive Tinel's right wrist negative Tinel's left wrist.  Negative Phalen's bilateral.  Normal hand  strength and no muscle atrophy seen.   Lymphadenopathy:      Cervical: No cervical adenopathy.   Neurological:      General: No focal deficit present.      Mental Status: She is alert and oriented to person, place, and time.   Psychiatric:         Mood and Affect: Mood normal.         Speech: Speech normal.         Behavior: Behavior normal.            Assessment and Plan     1. Type 2 diabetes mellitus with hyperglycemia, without long-term current use of insulin  -     blood-glucose meter kit; To check BG 1 times  daily, to use with insurance preferred meter  Dispense: 1 each; Refill: 0  -     lancets Misc; To check BG 1 times daily, to use with insurance preferred meter  Dispense: 100 each; Refill: 11  -     blood sugar diagnostic Strp; To check BG 1 times daily, to use with insurance preferred meter  Dispense: 100 each; Refill: 11  -     atorvastatin (LIPITOR) 40 MG tablet; Take 1 tablet (40 mg total) by mouth once daily.  Dispense: 90 tablet; Refill: 11    2. Routine general medical examination at a health care facility  -     Comprehensive Metabolic Panel; Future; Expected date: 02/04/2025    3. Bilateral hand numbness    4. Asthma in adult, mild intermittent, uncomplicated  -     Ambulatory referral/consult to Allergy; Future; Expected date: 02/11/2025        Assessment & Plan        CARPAL TUNNEL SYNDROME:  - Assessed reported hand numbness and tingling, present for 8-10 months, likely carpal tunnel syndrome based on exam and symptoms.  - Noted progression from occurring only upon waking to sometimes happening during the day.  - Performed physical exam, noting tingling sensation when applying pressure.  - Explained that diabetes-related numbness typically affects feet first, distinguishing from diabetic neuropathy.  - Provided patient education on carpal tunnel syndrome, including causes and potential treatments, with an educational handout.  - Recommend wearing wrist brace or gloves at night to alleviate symptoms.  - Instructed the patient to monitor whether the pinky finger is involved in hand numbness.  - Offered to order a nerve conduction test for definitive diagnosis, but the patient declined due to cost concerns.    DIABETES:  - - Prescribed a new glucose meter, test strips, and lancets to resume daily blood sugar monitoring.  - Instructed the patient to record results and report any significant changes.  - Discussed the patient's desire to manage diabetes through better lifestyle choices, noting that the patient  has been off metformin for a while.    ALLERGIES:  - Evaluated worsening allergy symptoms, noting the immediate need for allergy medicine upon waking up.  - Offered a referral to an allergist for further evaluation and management.  - Instructed the patient to schedule an appointment with an allergist if desired.    ASTHMA:  - Evaluated asthma symptoms, noting increased albuterol use, particularly in the morning for the past couple of months.  - Performed lung exam, which revealed normal findings.  - Refilled albuterol inhaler prescription.  - Advised the patient to monitor frequency of albuterol use and report any further increases.    BREAST CANCER SCREENING:  - Noted the patient's concern regarding family history of breast cancer.  - Confirmed that the patient has scheduled a mammogram for March.  - Emphasized the importance of regular breast cancer screenings given the family history.    HYPERLIPIDEMIA:  - Reviewed lipid management, noting good cholesterol levels from November.  - Continued atorvastatin (Lipitor) prescription with a refill for 1 year.  - Advised the patient to maintain current lifestyle modifications for cholesterol management.    WEIGHT MANAGEMENT:  - Addressed the patient's desire to be stronger and more active.  - Noted reduced physical activity due to Theodore Gras preparations.  - Provided dietary counseling, emphasizing the importance of balanced nutrition and regular exercise.  - Recommend gradually increasing physical activity as Theodore Gras preparations conclude.      FLU VACCINATION:  - Discussed ongoing need for annual flu and COVID vaccinations.  - Administered flu vaccine in office.    LABS:  - Ordered Complete Metabolic Panel (CMP).    FOLLOW UP:  - Assessed need for routine health maintenance, including vaccinations and lab work.  - Follow up in 1 year, unless new issues arise.                    Health Maintenance         Date Due Completion Date    Pneumococcal Vaccines (Age 50+) (1  of 2 - PCV) Never done ---    COVID-19 Vaccine (5 - 2024-25 season) 09/01/2024 6/7/2022    Shingles Vaccine (1 of 2) Never done ---    Foot Exam 12/05/2024 12/5/2023    Override on 12/6/2021: Done    Mammogram 03/27/2025 3/27/2024    Hemoglobin A1c 07/31/2025 1/31/2025    Lipid Panel 11/15/2025 11/15/2024    Cervical Cancer Screening 12/19/2025 12/19/2022    Diabetes Urine Screening 01/31/2026 1/31/2025    Diabetic Eye Exam 01/31/2026 1/31/2025    Low Dose Statin 02/04/2026 2/4/2025    TETANUS VACCINE 09/26/2026 9/26/2016    Colorectal Cancer Screening 10/06/2030 10/6/2020    RSV Vaccine (Age 60+ and Pregnant patients) (1 - 1-dose 75+ series) 10/28/2049 ---   Flu vax please, Declines other vaccines for today       Follow up in about 1 year (around 2/4/2026).    Visit today included increased complexity associated with the care of the episodic problem  addressed and managing the longitudinal care of the patient due to the serious and/or complex managed problem(s) .    Future Appointments   Date Time Provider Department Center   2/5/2025  2:00 PM Brittany Donald APRN, AKHILP [facility] PCW   3/28/2025  9:15 AM [facility] MAMMO1 [facility] MAMMO [facility]         This note was generated with the assistance of ambient listening technology. Verbal consent was obtained by the patient and accompanying visitor(s) for the recording of patient appointment to facilitate this note. I attest to having reviewed and edited the generated note for accuracy, though some syntax or spelling errors may persist. Please contact the author of this note for any clarification.

## 2025-02-05 DIAGNOSIS — E11.65 TYPE 2 DIABETES MELLITUS WITH HYPERGLYCEMIA, WITHOUT LONG-TERM CURRENT USE OF INSULIN: Primary | ICD-10-CM

## 2025-02-20 DIAGNOSIS — E11.65 TYPE 2 DIABETES MELLITUS WITH HYPERGLYCEMIA, WITHOUT LONG-TERM CURRENT USE OF INSULIN: ICD-10-CM

## 2025-02-20 RX ORDER — TIRZEPATIDE 5 MG/.5ML
5 INJECTION, SOLUTION SUBCUTANEOUS
Qty: 4 PEN | Refills: 4 | Status: SHIPPED | OUTPATIENT
Start: 2025-02-20

## 2025-02-20 RX ORDER — ATORVASTATIN CALCIUM 40 MG/1
40 TABLET, FILM COATED ORAL DAILY
Qty: 90 TABLET | Refills: 2 | Status: SHIPPED | OUTPATIENT
Start: 2025-02-20

## 2025-02-20 NOTE — TELEPHONE ENCOUNTER
Refill Decision Note   Tete Liu  is requesting a refill authorization.  Brief Assessment and Rationale for Refill:  Approve     Medication Therapy Plan:         Comments:     Note composed:7:56 AM 02/20/2025

## 2025-02-20 NOTE — TELEPHONE ENCOUNTER
No care due was identified.  Health [facility] Embedded Care Due Messages. Reference number: [id number].   2/20/2025 12:00:46 AM CST

## 2025-03-28 ENCOUNTER — HOSPITAL ENCOUNTER (OUTPATIENT)
Dept: RADIOLOGY | Facility: OTHER | Age: 50
Discharge: HOME | End: 2025-03-28
Attending: INTERNAL MEDICINE
Payer: COMMERCIAL

## 2025-03-28 DIAGNOSIS — Z12.31 BREAST CANCER SCREENING BY MAMMOGRAM: ICD-10-CM

## 2025-03-28 PROCEDURE — 77063 BREAST TOMOSYNTHESIS BI: CPT | Mod: 26,,, | Performed by: RADIOLOGY

## 2025-03-28 PROCEDURE — 77063 BREAST TOMOSYNTHESIS BI: CPT | Mod: TC

## 2025-03-28 PROCEDURE — 77067 SCR MAMMO BI INCL CAD: CPT | Mod: 26,,, | Performed by: RADIOLOGY

## 2025-06-12 RX ORDER — METFORMIN HYDROCHLORIDE 500 MG/1
500 TABLET, EXTENDED RELEASE ORAL 2 TIMES DAILY WITH MEALS
Qty: 180 TABLET | Refills: 3 | Status: SHIPPED | OUTPATIENT
Start: 2025-06-12

## 2025-06-12 NOTE — TELEPHONE ENCOUNTER
No care due was identified.  [facility] Embedded Care Due Messages. Reference number: [id number].   6/12/2025 4:55:21 PM CDT

## 2025-06-12 NOTE — TELEPHONE ENCOUNTER
Refill Routing Note   Medication(s) are not appropriate for processing by [facility] for the following reason(s):        Non-participating provider    [facility] action(s):  Route               Appointments  past 12m or future 3m with PCP    Date Provider   Last Visit   10/2/2024 Brittany Donald APRN, FNP   Next Visit   8/4/2025 Brittany Donald APRN, FNP   ED visits in past 90 days: 0        Note composed:5:16 PM 06/12/2025

## 2025-08-01 ENCOUNTER — LAB VISIT (OUTPATIENT)
Dept: LAB | Facility: HOSPITAL | Age: 50
End: 2025-08-01
Payer: COMMERCIAL

## 2025-08-01 ENCOUNTER — PATIENT MESSAGE (OUTPATIENT)
Dept: INTERNAL MEDICINE | Facility: CLINIC | Age: 50
End: 2025-08-01
Payer: COMMERCIAL

## 2025-08-01 DIAGNOSIS — E11.65 TYPE 2 DIABETES MELLITUS WITH HYPERGLYCEMIA, WITHOUT LONG-TERM CURRENT USE OF INSULIN: ICD-10-CM

## 2025-08-01 LAB
EAG (OHS): 111 MG/DL (ref 68–131)
HBA1C MFR BLD: 5.5 % (ref 4–5.6)

## 2025-08-01 PROCEDURE — 36415 COLL VENOUS BLD VENIPUNCTURE: CPT

## 2025-08-01 PROCEDURE — 83036 HEMOGLOBIN GLYCOSYLATED A1C: CPT

## 2025-08-01 NOTE — PROGRESS NOTES
CHIEF COMPLAINT: Type 2 Diabetes     HPI: Mrs. Tete Liu is a 49 y.o. female who was diagnosed with Type 2 DM in 11/2/2021, >14% to 6.3%  Past Medical History:   Diagnosis Date    Allergy     Asthma     adult onset    Type 2 diabetes mellitus     diagnosed 11/2021       Very busy,   Has 3 children, special needs, senior -, senior -college.   .  Gain 10#   Has been stressful.   Not consistent with mounjaro.     Lab Results   Component Value Date    HGBA1C 5.5 08/01/2025     A1c stable.    Off lantus > 2 years.      Goal to wean off antihyperglycemic agents  Traveling to [place] in June 2023.  C/o bilateral tingling in hands, cold feet over the past 4 months     Tracking BG 1x a day     Dietary habits:  Varies    Exercise: 5000 steps intentional   No formal  Works from [email or web address]   Walks dog      PREVIOUS DIABETES MEDICATIONS TRIED  lantus   Metformin   ozempic  Berberine    CURRENT DIABETIC MEDS:  mounjaro 5 mg weekly, metformin xr 500 mg bid , jardiance 10 mg daily    Diabetes Management Status    Statin: Taking  ACE/ARB: Not taking    Screening or Prevention Patient's value Goal Complete/Controlled?   HgA1C Testing and Control   Lab Results   Component Value Date    HGBA1C 5.5 08/01/2025      Annually/Less than 8% Yes   Lipid profile : 11/15/2024 Annually Yes   LDL control Lab Results   Component Value Date    LDLCALC 50.6 (L) 11/15/2024    Annually/Less than 100 mg/dl  Yes   Nephropathy screening Lab Results   Component Value Date    LABMICR 7.0 01/31/2025     Lab Results   Component Value Date    PROTEINUA Trace (A) 07/06/2017    Annually No   Blood pressure BP Readings from Last 1 Encounters:   02/04/25 126/70    Less than 140/90 Yes   Dilated retinal exam : 01/31/2025 Annually Yes   Foot exam   : 12/05/2023 Annually Yes     REVIEW OF SYSTEMS  General: no weakness, fatigue, +weight fluctuations 10#   Eyes: no double or blurred vision, eye pain, or redness  Cardiovascular: no chest pain,  "palpitations, edema, or murmurs.   Respiratory: no cough or dyspnea. +h/o asthma  GI: no heartburn, nausea, or changes in bowel patterns; good appetite.  Skin: no rashes, dryness, itching, or reactions at insulin injection sites.  Neuro: no numbness, tingling, tremors, or vertigo.  Endocrine: no polyuria, polydipsia, polyphagia, heat or cold intolerance. '  +feeling overwhelmed     Vital Signs  /78 (BP Location: Left arm, Patient Position: Sitting)   Pulse 78   Ht 5' 5" (1.651 m)   Wt 92.4 kg (203 lb 11.3 oz)   LMP 06/20/2021 (Approximate)   SpO2 95%   BMI 33.90 kg/m²     Hemoglobin A1C   Date Value Ref Range Status   01/31/2025 5.4 4.0 - 5.6 % Final     Comment:     ADA Screening Guidelines:  5.7-6.4%  Consistent with prediabetes  >or=6.5%  Consistent with diabetes    High levels of fetal hemoglobin interfere with the HbA1C  assay. Heterozygous hemoglobin variants (HbS, HgC, etc)do  not significantly interfere with this assay.   However, presence of multiple variants may affect accuracy.     09/06/2024 5.7 (H) 4.0 - 5.6 % Final     Comment:     ADA Screening Guidelines:  5.7-6.4%  Consistent with prediabetes  >or=6.5%  Consistent with diabetes    High levels of fetal hemoglobin interfere with the HbA1C  assay. Heterozygous hemoglobin variants (HbS, HgC, etc)do  not significantly interfere with this assay.   However, presence of multiple variants may affect accuracy.     04/12/2024 6.2 (H) 4.0 - 5.6 % Final     Comment:     ADA Screening Guidelines:  5.7-6.4%  Consistent with prediabetes  >or=6.5%  Consistent with diabetes    High levels of fetal hemoglobin interfere with the HbA1C  assay. Heterozygous hemoglobin variants (HbS, HgC, etc)do  not significantly interfere with this assay.   However, presence of multiple variants may affect accuracy.       Hemoglobin A1c   Date Value Ref Range Status   08/01/2025 5.5 4.0 - 5.6 % Final     Comment:     ADA Screening Guidelines:  5.7-6.4%  Consistent with " prediabetes  >=6.5%  Consistent with diabetes    High levels of fetal hemoglobin interfere with the HbA1C  assay. Heterozygous hemoglobin variants (HbS, HgC, etc)do  not significantly interfere with this assay.   However, presence of multiple variants may affect accuracy.        Chemistry        Component Value Date/Time     02/04/2025 1038    K 4.1 02/04/2025 1038     02/04/2025 1038    CO2 23 02/04/2025 1038    BUN 11 02/04/2025 1038    CREATININE 0.7 02/04/2025 1038    GLU 74 02/04/2025 1038        Component Value Date/Time    CALCIUM 9.6 02/04/2025 1038    ALKPHOS 109 02/04/2025 1038    AST 21 02/04/2025 1038    ALT 23 02/04/2025 1038    BILITOT 0.6 02/04/2025 1038    ESTGFRAFRICA >60.0 02/08/2022 0753    EGFRNONAA >60.0 02/08/2022 0753           Lab Results   Component Value Date    TSH 0.629 05/23/2023      Lab Results   Component Value Date    CHOL 118 (L) 11/15/2024    CHOL 133 11/27/2023    CHOL 221 (H) 05/23/2023     Lab Results   Component Value Date    HDL 43 11/15/2024    HDL 44 11/27/2023    HDL 40 05/23/2023     Lab Results   Component Value Date    LDLCALC 50.6 (L) 11/15/2024    LDLCALC 64.4 11/27/2023    LDLCALC 139.0 05/23/2023     Lab Results   Component Value Date    TRIG 122 11/15/2024    TRIG 123 11/27/2023    TRIG 210 (H) 05/23/2023     Lab Results   Component Value Date    CHOLHDL 36.4 11/15/2024    CHOLHDL 33.1 11/27/2023    CHOLHDL 18.1 (L) 05/23/2023         PHYSICAL EXAMINATION    Constitutional:  Well developed, well nourished, NAD.  ENT: External ears no masses with nose patent; normal hearing.   Neck:  Supple; trachea midline; no thyromegaly.   Cardiovascular: Normal heart sounds, no LE edema.     Lungs:  Normal effort; lungs anterior bilaterally clear to auscultation.  Abdomen:  Soft, no masses,  no hernias.  MS: No clubbing or cyanosis of nails noted; normal gait or unable to assess gait.  Skin: No rashes, lesions, or ulcers; no nodules.  Psychiatric: Good judgement and  insight; normal mood and affect.  Neurological: Cranial nerves II to XII are grossly intact.    Diabetes Foot Exam:   Appropriate footwear   No calluses   Sensation-no issues    Assessment/Plan    1. Type 2 diabetes mellitus with hyperglycemia, without long-term current use of insulin  Hemoglobin A1C  F/u in 5-6 months   Eat fit grocery list guide given   Discussed dietary habits  Admits to stress eating   Continue regimen above  Discussed getting rid of one medication- biguanide  Hold off for now      2. Moderate persistent asthma without complication  No recent steroids  Stable   Allergy referral       3. Obesity (BMI 30-39.9)  This condition increases insulin resistance  Encourage exercise 3-5 x a week, goal 150 minutes or more/week  If limited, encourage chair exercises -via youtube  Movement is key, walk after meals 15-30 minutes   Watch portions, protein  gm /day, carbs  gm /day, more fiber  Hydrate well, at least 64 oz, half body weight (lbs) in ounces per day  Look at resources per AVS w/ apps/websites    On mounjaro      4. Iron deficiency anemia, unspecified iron deficiency anemia type  May skew A1c, lowering affect       5. Hyperlipidemia, unspecified hyperlipidemia type  Pt is on statin  Goal less than 100 for ldl  Encourage pt to eat more whole grains, fiber  Eat more omega-3 via grilled/baked fish  Exercise more, 3-5 x a week at least 150 mins        6. Counseling and coordination of care  See above   Discussed exercise, better food choices  Mediterranean diet - placed via portal and snack options

## 2025-08-04 ENCOUNTER — OFFICE VISIT (OUTPATIENT)
Dept: INTERNAL MEDICINE | Facility: CLINIC | Age: 50
End: 2025-08-04
Payer: COMMERCIAL

## 2025-08-04 ENCOUNTER — PATIENT MESSAGE (OUTPATIENT)
Dept: INTERNAL MEDICINE | Facility: CLINIC | Age: 50
End: 2025-08-04

## 2025-08-04 VITALS
SYSTOLIC BLOOD PRESSURE: 112 MMHG | WEIGHT: 203.69 LBS | BODY MASS INDEX: 33.94 KG/M2 | HEART RATE: 78 BPM | DIASTOLIC BLOOD PRESSURE: 78 MMHG | HEIGHT: 65 IN | OXYGEN SATURATION: 95 %

## 2025-08-04 DIAGNOSIS — Z71.89 COUNSELING AND COORDINATION OF CARE: ICD-10-CM

## 2025-08-04 DIAGNOSIS — E78.5 HYPERLIPIDEMIA, UNSPECIFIED HYPERLIPIDEMIA TYPE: ICD-10-CM

## 2025-08-04 DIAGNOSIS — E66.9 OBESITY (BMI 30-39.9): ICD-10-CM

## 2025-08-04 DIAGNOSIS — J45.40 MODERATE PERSISTENT ASTHMA WITHOUT COMPLICATION: ICD-10-CM

## 2025-08-04 DIAGNOSIS — D50.9 IRON DEFICIENCY ANEMIA, UNSPECIFIED IRON DEFICIENCY ANEMIA TYPE: ICD-10-CM

## 2025-08-04 DIAGNOSIS — E11.65 TYPE 2 DIABETES MELLITUS WITH HYPERGLYCEMIA, WITHOUT LONG-TERM CURRENT USE OF INSULIN: Primary | ICD-10-CM

## 2025-08-04 PROCEDURE — 3044F HG A1C LEVEL LT 7.0%: CPT | Mod: CPTII,S$GLB,, | Performed by: NURSE PRACTITIONER

## 2025-08-04 PROCEDURE — 2023F DILAT RTA XM W/O RTNOPTHY: CPT | Mod: CPTII,S$GLB,, | Performed by: NURSE PRACTITIONER

## 2025-08-04 PROCEDURE — 3061F NEG MICROALBUMINURIA REV: CPT | Mod: CPTII,S$GLB,, | Performed by: NURSE PRACTITIONER

## 2025-08-04 PROCEDURE — 99214 OFFICE O/P EST MOD 30 MIN: CPT | Mod: S$GLB,,, | Performed by: NURSE PRACTITIONER

## 2025-08-04 PROCEDURE — 3066F NEPHROPATHY DOC TX: CPT | Mod: CPTII,S$GLB,, | Performed by: NURSE PRACTITIONER

## 2025-08-04 PROCEDURE — 3008F BODY MASS INDEX DOCD: CPT | Mod: CPTII,S$GLB,, | Performed by: NURSE PRACTITIONER

## 2025-08-04 PROCEDURE — 3078F DIAST BP <80 MM HG: CPT | Mod: CPTII,S$GLB,, | Performed by: NURSE PRACTITIONER

## 2025-08-04 PROCEDURE — 1160F RVW MEDS BY RX/DR IN RCRD: CPT | Mod: CPTII,S$GLB,, | Performed by: NURSE PRACTITIONER

## 2025-08-04 PROCEDURE — G2211 COMPLEX E/M VISIT ADD ON: HCPCS | Mod: S$GLB,,, | Performed by: NURSE PRACTITIONER

## 2025-08-04 PROCEDURE — 99999 PR PBB SHADOW E&M-EST. PATIENT-LVL V: CPT | Mod: PBBFAC,,, | Performed by: NURSE PRACTITIONER

## 2025-08-04 PROCEDURE — 1159F MED LIST DOCD IN RCRD: CPT | Mod: CPTII,S$GLB,, | Performed by: NURSE PRACTITIONER

## 2025-08-04 PROCEDURE — 3074F SYST BP LT 130 MM HG: CPT | Mod: CPTII,S$GLB,, | Performed by: NURSE PRACTITIONER

## 2025-08-04 NOTE — PATIENT INSTRUCTIONS
Mounjaro 5 mg weekly   Jardiance 10 mg daily  Metformin xr 500 mg twice a day  Lab Results   Component Value Date    HGBA1C 5.5 08/01/2025     Goal less than 6.5%    [email or web address]  Eat fit gina  Myfitnesspal gina  [email or web address]   MySugr gina  Glucose guide gina  [email or web address] exercises     Follow up in 5-6 months w/Irielle  A1c prior -5-6 months

## 2025-08-08 ENCOUNTER — PATIENT MESSAGE (OUTPATIENT)
Dept: INTERNAL MEDICINE | Facility: CLINIC | Age: 50
End: 2025-08-08
Payer: COMMERCIAL

## 2025-08-08 DIAGNOSIS — E11.65 TYPE 2 DIABETES MELLITUS WITH HYPERGLYCEMIA, WITHOUT LONG-TERM CURRENT USE OF INSULIN: Primary | ICD-10-CM

## 2025-08-08 RX ORDER — TIRZEPATIDE 5 MG/.5ML
5 INJECTION, SOLUTION SUBCUTANEOUS
Qty: 4 PEN | Refills: 4 | Status: SHIPPED | OUTPATIENT
Start: 2025-08-08

## (undated) DEVICE — CONTAINER SPECIMEN STRL 4OZ

## (undated) DEVICE — KIT ANTIFOG

## (undated) DEVICE — SEE MEDLINE ITEM 146313

## (undated) DEVICE — TRAY ENT 4/CS